# Patient Record
Sex: FEMALE | Race: WHITE | Employment: OTHER | URBAN - METROPOLITAN AREA
[De-identification: names, ages, dates, MRNs, and addresses within clinical notes are randomized per-mention and may not be internally consistent; named-entity substitution may affect disease eponyms.]

---

## 2017-01-09 ENCOUNTER — APPOINTMENT (OUTPATIENT)
Dept: RADIOLOGY | Facility: IMAGING CENTER | Age: 62
End: 2017-01-09
Attending: PHYSICIAN ASSISTANT
Payer: COMMERCIAL

## 2017-01-09 ENCOUNTER — OFFICE VISIT (OUTPATIENT)
Dept: URGENT CARE | Facility: CLINIC | Age: 62
End: 2017-01-09
Payer: COMMERCIAL

## 2017-01-09 VITALS
OXYGEN SATURATION: 94 % | DIASTOLIC BLOOD PRESSURE: 84 MMHG | WEIGHT: 201 LBS | HEART RATE: 85 BPM | BODY MASS INDEX: 36.99 KG/M2 | TEMPERATURE: 98.2 F | HEIGHT: 62 IN | SYSTOLIC BLOOD PRESSURE: 120 MMHG
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
## 2017-01-09 DIAGNOSIS — S59.902A INJURY OF ELBOW, LEFT, INITIAL ENCOUNTER: ICD-10-CM

## 2017-01-09 DIAGNOSIS — S42.402A CLOSED FRACTURE OF DISTAL END OF LEFT HUMERUS, UNSPECIFIED FRACTURE MORPHOLOGY, INITIAL ENCOUNTER: Primary | ICD-10-CM

## 2017-01-09 PROCEDURE — 73080 X-RAY EXAM OF ELBOW: CPT | Mod: TC | Performed by: PHYSICIAN ASSISTANT

## 2017-01-09 PROCEDURE — 99204 OFFICE O/P NEW MOD 45 MIN: CPT | Performed by: PHYSICIAN ASSISTANT

## 2017-01-09 PROCEDURE — A6448 LT COMPRES BAND <3"/YD: HCPCS | Performed by: PHYSICIAN ASSISTANT

## 2017-01-09 PROCEDURE — A4590 SPECIAL CASTING MATERIAL: HCPCS | Performed by: PHYSICIAN ASSISTANT

## 2017-01-09 PROCEDURE — A4565 SLINGS: HCPCS | Performed by: PHYSICIAN ASSISTANT

## 2017-01-09 RX ORDER — HYDROCODONE BITARTRATE AND ACETAMINOPHEN 5; 325 MG/1; MG/1
1-2 TABLET ORAL EVERY 4 HOURS PRN
Qty: 20 TAB | Refills: 0 | Status: SHIPPED | OUTPATIENT
Start: 2017-01-09 | End: 2018-01-13

## 2017-01-09 NOTE — MR AVS SNAPSHOT
"        Deloris Bobr   2017 6:15 PM   Office Visit   MRN: [id number]    Department:  [facility]   Dept Phone:  [phone]    Description:  Female : 1955   Provider:  Laurie Srinivasan PA-C           Reason for Visit     Arm Pain poss left elbow sprain      Allergies as of 2017     No Known Allergies      You were diagnosed with     Injury of elbow, left, initial encounter   [[id number]]         Vital Signs     Blood Pressure Pulse Temperature Height Weight Body Mass Index    120/84 mmHg 85 36.8 °C (98.2 °F) 1.575 m (5' 2\") 91.173 kg (201 lb) 36.75 kg/m2    Oxygen Saturation Smoking Status                94% Never Smoker           Basic Information     Date Of Birth Sex Race Ethnicity Preferred Language    1955 Female White Unknown English      Health Maintenance        Date Due Completion Dates    IMM DTaP/Tdap/Td Vaccine (1 - Tdap) 11/10/1972 ---    PAP SMEAR 11/10/1974 ---    COLONOSCOPY 11/10/2003 ---    IMM ZOSTER VACCINE 11/10/2013 ---    MAMMOGRAM 12/3/2015 12/3/2014, 2013, 11/15/2012, 2008, 2008, 3/12/2007, 3/2/2007, 2006, 2004    IMM INFLUENZA (1) 2016 ---            Current Immunizations     Influenza Vaccine 10/10/2010    Pneumococcal polysaccharide vaccine (PPSV-23) 2011  5:45 AM      Below and/or attached are the medications your provider expects you to take. Review all of your home medications and newly ordered medications with your provider and/or pharmacist. Follow medication instructions as directed by your provider and/or pharmacist. Please keep your medication list with you and share with your provider. Update the information when medications are discontinued, doses are changed, or new medications (including over-the-counter products) are added; and carry medication information at all times in the event of emergency situations     Allergies:  No Known Allergies          Medications  Valid as of: 2017 -  7:11 PM    Generic " Name Brand Name Tablet Size Instructions for use    Albuterol Sulfate (Aero Soln) albuterol 108 (90 BASE) MCG/ACT Inhale 2 Puffs by mouth every 2 hours as needed for Shortness of Breath.        Amlodipine-Olmesartan   Take  by mouth.        Atorvastatin Calcium   Take  by mouth.        Dextromethorphan-Guaifenesin (Syrup) ROBITUSSIN -10 MG/5ML Take 10 mL by mouth every 6 hours as needed for Cough (Cough while awake).        Levothyroxine Sodium   Take  by mouth.        Moxifloxacin HCl (Tab) AVELOX 400 MG Take 1 Tab by mouth every day.        .                 Medicines prescribed today were sent to:     [id number] - [place], [place] - [address] AT [facility] [address]    [address] [place] [address]    Phone: [phone] Fax: [phone]    Open 24 Hours?: No      Medication refill instructions:       If your prescription bottle indicates you have medication refills left, it is not necessary to call your provider’s office. Please contact your pharmacy and they will refill your medication.    If your prescription bottle indicates you do not have any refills left, you may request refills at any time through one of the following ways: The online [employer] system (except Urgent Care), by calling your provider’s office, or by asking your pharmacy to contact your provider’s office with a refill request. Medication refills are processed only during regular business hours and may not be available until the next business day. Your provider may request additional information or to have a follow-up visit with you prior to refilling your medication.   *Please Note: Medication refills are assigned a new Rx number when refilled electronically. Your pharmacy may indicate that no refills were authorized even though a new prescription for the same medication is available at the pharmacy. Please request the medicine by name with the pharmacy before contacting your provider for a refill.        Your  To Do List     Future Labs/Procedures Complete By Expires    DX-ELBOW-COMPLETE 3+ LEFT  As directed 1/9/2018         StyleFactory Access Code: [email or web address]  Expires: 2/8/2017  6:05 PM    StyleFactory  A secure, online tool to manage your health information     [employer] StyleFactory® is a secure, online tool that connects you to your personalized health information from the privacy of your home -- day or night - making it very easy for you to manage your healthcare. Once the activation process is completed, you can even access your medical information using the StyleFactory nigel, which is available for free in the Apple Nigel store or Google Play store.     StyleFactory provides the following levels of access (as shown below):   My Chart Features   [facility] Doctor [facility]  Urgent  Care [facility]  Primary Care  Doctor   Email your healthcare team securely and privately 24/7 X X X    Manage appointments: schedule your next appointment; view details of past/upcoming appointments X      Request prescription refills. X      View recent personal medical records, including lab and immunizations X X X X   View health record, including health history, allergies, medications X X X X   Read reports about your outpatient visits, procedures, consult and ER notes X X X X   See your discharge summary, which is a recap of your hospital and/or ER visit that includes your diagnosis, lab results, and care plan. X X       How to register for Style[employer]:  1. Go to  [email or web address].  2. Click on the Sign Up Now box, which takes you to the New Member Sign Up page. You will need to provide the following information:  a. Enter your StyleFactory Access Code exactly as it appears at the top of this page. (You will not need to use this code after you’ve completed the sign-up process. If you do not sign up before the expiration date, you must request a new code.)   b. Enter your date of birth.   c. Enter your home email  address.   d. Click Submit, and follow the next screen’s instructions.  3. Create a [employer] ID. This will be your Lono login ID and cannot be changed, so think of one that is secure and easy to remember.  4. Create a [employer] password. You can change your password at any time.  5. Enter your Password Reset Question and Answer. This can be used at a later time if you forget your password.   6. Enter your e-mail address. This allows you to receive e-mail notifications when new information is available in Lono.  7. Click Sign Up. You can now view your health information.    For assistance activating your Lono account, call [phone]

## 2017-01-10 NOTE — PATIENT INSTRUCTIONS
Elbow Injury  You or your child has an elbow injury. X-rays and exam today do not show evidence of a fracture (broken bone). That means that only a sling or splint may be required for a brief period of time as directed by your caregiver.  HOME CARE INSTRUCTIONS  · Only take over-the-counter or prescription medicines for pain, discomfort, or fever as directed by your caregiver.   · If you have a splint held on with an elastic wrap or a cast, watch your hand or fingers. If they become numb or cold and blue, loosen the wrap and reapply more loosely. See your caregiver if there is no relief.   · You may use ice on your elbow for 15-20 minutes, 3-4 times per day, for the first 2 to 3 days.   · Use your elbow as directed.   · See your caregiver as directed. It is very important to keep all follow-up referrals and appointments in order to avoid any long-term problems with your elbow including chronic pain or inability to move the elbow normally.   SEEK IMMEDIATE MEDICAL CARE IF:   · There is swelling or increasing pain in your elbow which is not relieved with medications.   · You begin to lose feeling in your hand or fingers, or develop swelling of the hand and fingers.   · You get a cold or blue hand or fingers on injured side.   · If your elbow remains sore, your caregiver may want to x-ray it again. A hairline fracture may not show up on the first x-rays and may only be seen on repeat x-rays ten days to two weeks later. A specialist (radiologist) may examine your x-rays at a later time. In order to get results from the radiologist or their department, make sure you know how and when you are to get that information. It is your responsibility to get results of any tests you may have had.   MAKE SURE YOU:   · Understand these instructions.   · Will watch your condition.   · Will get help right away if you are not doing well or get worse.   Document Released: 03/09/2005 Document Revised: 03/11/2013 Document Reviewed:  08/05/2009  ExitCare® Patient Information ©2013 [employer].

## 2017-01-14 ASSESSMENT — ENCOUNTER SYMPTOMS
TINGLING: 1
NUMBNESS: 0

## 2017-01-14 NOTE — PROGRESS NOTES
Subjective:      Deloris Brandt is a 61 y.o. female who presents with Arm Pain    PMH:  has a past medical history of Hyperthyroidism and Hypertension.  MEDS:   Current outpatient prescriptions:   •  hydrocodone-acetaminophen (NORCO) 5-325 MG Tab per tablet, Take 1-2 Tabs by mouth every four hours as needed., Disp: 20 Tab, Rfl: 0  •  guaifenesin DM (ROBITUSSIN DM) 100-10 MG/5ML SYRP, Take 10 mL by mouth every 6 hours as needed for Cough (Cough while awake)., Disp: 1 Bottle, Rfl: 0  •  moxifloxacin (AVELOX) 400 MG TABS, Take 1 Tab by mouth every day., Disp: 10 Each, Rfl: 0  •  albuterol (VENTOLIN OR PROVENTIL) 108 (90 BASE) MCG/ACT AERS, Inhale 2 Puffs by mouth every 2 hours as needed for Shortness of Breath., Disp: 1 Inhaler, Rfl: 0  •  Amlodipine-Olmesartan (RYAN PO), Take  by mouth., Disp: , Rfl:   •  Atorvastatin Calcium (LIPITOR PO), Take  by mouth., Disp: , Rfl:   •  Levothyroxine Sodium (SYNTHROID PO), Take  by mouth., Disp: , Rfl:   ALLERGIES: No Known Allergies  SURGHX:   Past Surgical History   Procedure Laterality Date   • Other abdominal surgery  01/01/2001     Gastric bypass   • Appendectomy  48 years ago   • Other       tonsilectomy     SOCHX:  reports that she has never smoked. She does not have any smokeless tobacco history on file. She reports that she does not drink alcohol or use illicit drugs.  FH:  Reviewed with patient/family. Not pertinent to this complaint.           HPI Comments: Patient presents with:  Arm Pain: poss left elbow sprain, pt states she tripped over the handle of a snow shovel, put her arm out to catch herself.  PT co pain in elbow, even though she didn't hit it on the ice.         Arm Pain   The incident occurred 3 to 6 hours ago. The incident occurred at home. The injury mechanism was a fall. The pain is present in the left elbow. The quality of the pain is described as aching and burning. The pain does not radiate. The pain is at a severity of 7/10. The pain has been constant  "since the incident. Associated symptoms include tingling (in 4/5 fingers). Pertinent negatives include no chest pain or numbness. The symptoms are aggravated by movement and palpation. She has tried ice and rest for the symptoms. The treatment provided no relief.       Review of Systems   Cardiovascular: Negative for chest pain.   Musculoskeletal:        Elbow pain     Neurological: Positive for tingling (in 4/5 fingers). Negative for numbness.   All other systems reviewed and are negative.         Objective:     /84 mmHg  Pulse 85  Temp(Src) 36.8 °C (98.2 °F)  Ht 1.575 m (5' 2\")  Wt 91.173 kg (201 lb)  BMI 36.75 kg/m2  SpO2 94%     Physical Exam   Constitutional: She is oriented to person, place, and time. She appears well-developed and well-nourished. No distress.   HENT:   Head: Normocephalic and atraumatic.   Eyes: EOM are normal. Pupils are equal, round, and reactive to light.   Neck: Normal range of motion. Neck supple.   Cardiovascular: Normal rate.    Pulmonary/Chest: Effort normal.   Musculoskeletal:        Left elbow: She exhibits decreased range of motion and swelling. She exhibits no effusion and no deformity. Tenderness found. Lateral epicondyle tenderness noted. No olecranon process tenderness noted.        Arms:  Neurological: She is alert and oriented to person, place, and time.   Skin: Skin is warm and dry.   Psychiatric: She has a normal mood and affect.   Nursing note and vitals reviewed.              Assessment/Plan:     1. Closed fracture of distal end of left humerus, unspecified fracture morphology, initial encounter  DX-ELBOW-COMPLETE 3+ LEFT    hydrocodone-acetaminophen (NORCO) 5-325 MG Tab per tablet   2. Injury of elbow, left, initial encounter  DX-ELBOW-COMPLETE 3+ LEFT    hydrocodone-acetaminophen (NORCO) 5-325 MG Tab per tablet   3. Fall at home, initial encounter  DX-ELBOW-COMPLETE 3+ LEFT    hydrocodone-acetaminophen (NORCO) 5-325 MG Tab per tablet     PT placed in " posterior ortho glass splint and then sling.      RICE TREATMENT FOR EXTREMITY INJURIES:  R-rest the extremity as much as possible while pain and swelling persist  I-ice the extremity 15 minutes every 2 hours for the first 24 hours, then 4-5 times daily   C-compress the extremity either with splint or ace wrap as directed  E-elevate the extremity to help with swelling      PT should follow up with ortho in 1-2 days for treatment.   Discussed red flags and reasons to return to UC or ED.  Pt and/or family verbalized understanding of diagnosis and follow up instructions and was given informational handout on diagnosis.  PT discharged.

## 2017-09-26 ENCOUNTER — HOSPITAL ENCOUNTER (OUTPATIENT)
Dept: RADIOLOGY | Facility: MEDICAL CENTER | Age: 62
End: 2017-09-26
Attending: NURSE PRACTITIONER
Payer: COMMERCIAL

## 2017-09-26 DIAGNOSIS — J98.11 DISCOID ATELECTASIS: ICD-10-CM

## 2017-09-26 PROCEDURE — 71020 DX-CHEST-2 VIEWS: CPT

## 2018-01-13 ENCOUNTER — HOSPITAL ENCOUNTER (INPATIENT)
Facility: MEDICAL CENTER | Age: 63
LOS: 2 days | DRG: 193 | End: 2018-01-15
Attending: EMERGENCY MEDICINE | Admitting: FAMILY MEDICINE
Payer: COMMERCIAL

## 2018-01-13 ENCOUNTER — APPOINTMENT (OUTPATIENT)
Dept: RADIOLOGY | Facility: MEDICAL CENTER | Age: 63
DRG: 193 | End: 2018-01-13
Attending: EMERGENCY MEDICINE
Payer: COMMERCIAL

## 2018-01-13 ENCOUNTER — RESOLUTE PROFESSIONAL BILLING HOSPITAL PROF FEE (OUTPATIENT)
Dept: HOSPITALIST | Facility: MEDICAL CENTER | Age: 63
End: 2018-01-13
Payer: COMMERCIAL

## 2018-01-13 DIAGNOSIS — J18.9 PNEUMONIA OF RIGHT LOWER LOBE DUE TO INFECTIOUS ORGANISM: ICD-10-CM

## 2018-01-13 DIAGNOSIS — R09.02 HYPOXIA: ICD-10-CM

## 2018-01-13 PROBLEM — A41.9 SEPSIS (HCC): Status: ACTIVE | Noted: 2018-01-13

## 2018-01-13 PROBLEM — E03.9 HYPOTHYROID: Status: ACTIVE | Noted: 2018-01-13

## 2018-01-13 PROBLEM — R82.81 PYURIA: Status: ACTIVE | Noted: 2018-01-13

## 2018-01-13 PROBLEM — I10 HTN (HYPERTENSION): Status: ACTIVE | Noted: 2018-01-13

## 2018-01-13 PROBLEM — J96.01 ACUTE RESPIRATORY FAILURE WITH HYPOXIA (HCC): Status: ACTIVE | Noted: 2018-01-13

## 2018-01-13 LAB
ALBUMIN SERPL BCP-MCNC: 3.7 G/DL (ref 3.2–4.9)
ALBUMIN/GLOB SERPL: 0.9 G/DL
ALP SERPL-CCNC: 63 U/L (ref 30–99)
ALT SERPL-CCNC: 11 U/L (ref 2–50)
ANION GAP SERPL CALC-SCNC: 11 MMOL/L (ref 0–11.9)
APPEARANCE UR: ABNORMAL
APTT PPP: 36 SEC (ref 24.7–36)
AST SERPL-CCNC: 19 U/L (ref 12–45)
BACTERIA #/AREA URNS HPF: ABNORMAL /HPF
BASOPHILS # BLD AUTO: 0 % (ref 0–1.8)
BASOPHILS # BLD: 0 K/UL (ref 0–0.12)
BILIRUB SERPL-MCNC: 0.3 MG/DL (ref 0.1–1.5)
BILIRUB UR QL STRIP.AUTO: NEGATIVE
BNP SERPL-MCNC: 10 PG/ML (ref 0–100)
BNP SERPL-MCNC: 12 PG/ML (ref 0–100)
BUN SERPL-MCNC: 34 MG/DL (ref 8–22)
CALCIUM SERPL-MCNC: 8.5 MG/DL (ref 8.5–10.5)
CHLORIDE SERPL-SCNC: 103 MMOL/L (ref 96–112)
CO2 SERPL-SCNC: 25 MMOL/L (ref 20–33)
COLOR UR: YELLOW
CREAT SERPL-MCNC: 0.69 MG/DL (ref 0.5–1.4)
EKG IMPRESSION: NORMAL
EOSINOPHIL # BLD AUTO: 0.12 K/UL (ref 0–0.51)
EOSINOPHIL NFR BLD: 1 % (ref 0–6.9)
EPI CELLS #/AREA URNS HPF: ABNORMAL /HPF
ERYTHROCYTE [DISTWIDTH] IN BLOOD BY AUTOMATED COUNT: 43.5 FL (ref 35.9–50)
FLUAV+FLUBV AG SPEC QL IA: NORMAL
GLOBULIN SER CALC-MCNC: 3.9 G/DL (ref 1.9–3.5)
GLUCOSE SERPL-MCNC: 100 MG/DL (ref 65–99)
GLUCOSE UR STRIP.AUTO-MCNC: NEGATIVE MG/DL
HCT VFR BLD AUTO: 38.5 % (ref 37–47)
HGB BLD-MCNC: 12.6 G/DL (ref 12–16)
HYALINE CASTS #/AREA URNS LPF: ABNORMAL /LPF
INR PPP: 1.09 (ref 0.87–1.13)
KETONES UR STRIP.AUTO-MCNC: 40 MG/DL
LACTATE BLD-SCNC: 1 MMOL/L (ref 0.5–2)
LACTATE BLD-SCNC: 1 MMOL/L (ref 0.5–2)
LACTATE BLD-SCNC: 1.2 MMOL/L (ref 0.5–2)
LACTATE BLD-SCNC: 1.3 MMOL/L (ref 0.5–2)
LEUKOCYTE ESTERASE UR QL STRIP.AUTO: ABNORMAL
LG PLATELETS BLD QL SMEAR: NORMAL
LYMPHOCYTES # BLD AUTO: 1.43 K/UL (ref 1–4.8)
LYMPHOCYTES NFR BLD: 12 % (ref 22–41)
MANUAL DIFF BLD: NORMAL
MCH RBC QN AUTO: 26.8 PG (ref 27–33)
MCHC RBC AUTO-ENTMCNC: 32.7 G/DL (ref 33.6–35)
MCV RBC AUTO: 81.7 FL (ref 81.4–97.8)
MICRO URNS: ABNORMAL
MONOCYTES # BLD AUTO: 0.6 K/UL (ref 0–0.85)
MONOCYTES NFR BLD AUTO: 5 % (ref 0–13.4)
MORPHOLOGY BLD-IMP: NORMAL
NEUTROPHILS # BLD AUTO: 9.76 K/UL (ref 2–7.15)
NEUTROPHILS NFR BLD: 82 % (ref 44–72)
NITRITE UR QL STRIP.AUTO: NEGATIVE
NRBC # BLD AUTO: 0 K/UL
NRBC BLD-RTO: 0 /100 WBC
OVALOCYTES BLD QL SMEAR: NORMAL
PH UR STRIP.AUTO: 5 [PH]
PLATELET # BLD AUTO: 365 K/UL (ref 164–446)
PMV BLD AUTO: 9.5 FL (ref 9–12.9)
POTASSIUM SERPL-SCNC: 3.6 MMOL/L (ref 3.6–5.5)
PROT SERPL-MCNC: 7.6 G/DL (ref 6–8.2)
PROT UR QL STRIP: 30 MG/DL
PROTHROMBIN TIME: 13.8 SEC (ref 12–14.6)
RBC # BLD AUTO: 4.71 M/UL (ref 4.2–5.4)
RBC # URNS HPF: ABNORMAL /HPF
RBC BLD AUTO: PRESENT
RBC UR QL AUTO: ABNORMAL
SIGNIFICANT IND 70042: NORMAL
SITE SITE: NORMAL
SODIUM SERPL-SCNC: 139 MMOL/L (ref 135–145)
SOURCE SOURCE: NORMAL
SP GR UR STRIP.AUTO: 1.02
TROPONIN I SERPL-MCNC: <0.01 NG/ML (ref 0–0.04)
TSH SERPL DL<=0.005 MIU/L-ACNC: 0.06 UIU/ML (ref 0.38–5.33)
UROBILINOGEN UR STRIP.AUTO-MCNC: 0.2 MG/DL
VARIANT LYMPHS BLD QL SMEAR: NORMAL
WBC # BLD AUTO: 11.9 K/UL (ref 4.8–10.8)
WBC #/AREA URNS HPF: ABNORMAL /HPF

## 2018-01-13 PROCEDURE — 99222 1ST HOSP IP/OBS MODERATE 55: CPT | Performed by: FAMILY MEDICINE

## 2018-01-13 PROCEDURE — 84484 ASSAY OF TROPONIN QUANT: CPT

## 2018-01-13 PROCEDURE — 81001 URINALYSIS AUTO W/SCOPE: CPT

## 2018-01-13 PROCEDURE — 87086 URINE CULTURE/COLONY COUNT: CPT

## 2018-01-13 PROCEDURE — 85610 PROTHROMBIN TIME: CPT

## 2018-01-13 PROCEDURE — 700102 HCHG RX REV CODE 250 W/ 637 OVERRIDE(OP): Performed by: FAMILY MEDICINE

## 2018-01-13 PROCEDURE — 87400 INFLUENZA A/B EACH AG IA: CPT

## 2018-01-13 PROCEDURE — 99285 EMERGENCY DEPT VISIT HI MDM: CPT

## 2018-01-13 PROCEDURE — 700105 HCHG RX REV CODE 258: Performed by: FAMILY MEDICINE

## 2018-01-13 PROCEDURE — 700102 HCHG RX REV CODE 250 W/ 637 OVERRIDE(OP): Performed by: EMERGENCY MEDICINE

## 2018-01-13 PROCEDURE — 700101 HCHG RX REV CODE 250: Performed by: FAMILY MEDICINE

## 2018-01-13 PROCEDURE — 83605 ASSAY OF LACTIC ACID: CPT | Mod: 91

## 2018-01-13 PROCEDURE — 84443 ASSAY THYROID STIM HORMONE: CPT

## 2018-01-13 PROCEDURE — 770020 HCHG ROOM/CARE - TELE (206)

## 2018-01-13 PROCEDURE — A9270 NON-COVERED ITEM OR SERVICE: HCPCS | Performed by: EMERGENCY MEDICINE

## 2018-01-13 PROCEDURE — 36415 COLL VENOUS BLD VENIPUNCTURE: CPT

## 2018-01-13 PROCEDURE — 80053 COMPREHEN METABOLIC PANEL: CPT

## 2018-01-13 PROCEDURE — A9270 NON-COVERED ITEM OR SERVICE: HCPCS | Performed by: FAMILY MEDICINE

## 2018-01-13 PROCEDURE — 85007 BL SMEAR W/DIFF WBC COUNT: CPT

## 2018-01-13 PROCEDURE — 96361 HYDRATE IV INFUSION ADD-ON: CPT

## 2018-01-13 PROCEDURE — 94640 AIRWAY INHALATION TREATMENT: CPT

## 2018-01-13 PROCEDURE — 94760 N-INVAS EAR/PLS OXIMETRY 1: CPT

## 2018-01-13 PROCEDURE — 71045 X-RAY EXAM CHEST 1 VIEW: CPT

## 2018-01-13 PROCEDURE — 93005 ELECTROCARDIOGRAM TRACING: CPT

## 2018-01-13 PROCEDURE — 96374 THER/PROPH/DIAG INJ IV PUSH: CPT

## 2018-01-13 PROCEDURE — 87040 BLOOD CULTURE FOR BACTERIA: CPT

## 2018-01-13 PROCEDURE — 700111 HCHG RX REV CODE 636 W/ 250 OVERRIDE (IP): Performed by: EMERGENCY MEDICINE

## 2018-01-13 PROCEDURE — 85730 THROMBOPLASTIN TIME PARTIAL: CPT

## 2018-01-13 PROCEDURE — 93005 ELECTROCARDIOGRAM TRACING: CPT | Performed by: EMERGENCY MEDICINE

## 2018-01-13 PROCEDURE — 83880 ASSAY OF NATRIURETIC PEPTIDE: CPT

## 2018-01-13 PROCEDURE — 85027 COMPLETE CBC AUTOMATED: CPT

## 2018-01-13 RX ORDER — LEVOTHYROXINE SODIUM 0.12 MG/1
125 TABLET ORAL DAILY
COMMUNITY
End: 2018-12-18 | Stop reason: SDUPTHER

## 2018-01-13 RX ORDER — LOVASTATIN 20 MG/1
20 TABLET ORAL DAILY
COMMUNITY
End: 2018-12-18 | Stop reason: SDUPTHER

## 2018-01-13 RX ORDER — SODIUM CHLORIDE 9 MG/ML
INJECTION, SOLUTION INTRAVENOUS CONTINUOUS
Status: DISCONTINUED | OUTPATIENT
Start: 2018-01-13 | End: 2018-01-14

## 2018-01-13 RX ORDER — TIZANIDINE 4 MG/1
4 TABLET ORAL EVERY 8 HOURS
Status: DISCONTINUED | OUTPATIENT
Start: 2018-01-13 | End: 2018-01-14

## 2018-01-13 RX ORDER — POLYETHYLENE GLYCOL 3350 17 G/17G
1 POWDER, FOR SOLUTION ORAL
Status: DISCONTINUED | OUTPATIENT
Start: 2018-01-13 | End: 2018-01-15 | Stop reason: HOSPADM

## 2018-01-13 RX ORDER — OXYCODONE HYDROCHLORIDE 5 MG/1
5 TABLET ORAL
Status: DISCONTINUED | OUTPATIENT
Start: 2018-01-13 | End: 2018-01-15 | Stop reason: HOSPADM

## 2018-01-13 RX ORDER — BISACODYL 10 MG
10 SUPPOSITORY, RECTAL RECTAL
Status: DISCONTINUED | OUTPATIENT
Start: 2018-01-13 | End: 2018-01-15 | Stop reason: HOSPADM

## 2018-01-13 RX ORDER — MORPHINE SULFATE 4 MG/ML
2 INJECTION, SOLUTION INTRAMUSCULAR; INTRAVENOUS
Status: DISCONTINUED | OUTPATIENT
Start: 2018-01-13 | End: 2018-01-15 | Stop reason: HOSPADM

## 2018-01-13 RX ORDER — AZITHROMYCIN 250 MG/1
500 TABLET, FILM COATED ORAL DAILY
Status: DISCONTINUED | OUTPATIENT
Start: 2018-01-14 | End: 2018-01-15 | Stop reason: HOSPADM

## 2018-01-13 RX ORDER — LOVASTATIN 20 MG/1
20 TABLET ORAL
Status: DISCONTINUED | OUTPATIENT
Start: 2018-01-13 | End: 2018-01-15 | Stop reason: HOSPADM

## 2018-01-13 RX ORDER — TIZANIDINE 4 MG/1
4 TABLET ORAL EVERY 8 HOURS
COMMUNITY
End: 2023-10-31

## 2018-01-13 RX ORDER — ONDANSETRON 2 MG/ML
4 INJECTION INTRAMUSCULAR; INTRAVENOUS EVERY 4 HOURS PRN
Status: DISCONTINUED | OUTPATIENT
Start: 2018-01-13 | End: 2018-01-15 | Stop reason: HOSPADM

## 2018-01-13 RX ORDER — AMOXICILLIN 250 MG
2 CAPSULE ORAL 2 TIMES DAILY
Status: DISCONTINUED | OUTPATIENT
Start: 2018-01-13 | End: 2018-01-15 | Stop reason: HOSPADM

## 2018-01-13 RX ORDER — SODIUM CHLORIDE 9 MG/ML
500 INJECTION, SOLUTION INTRAVENOUS
Status: DISCONTINUED | OUTPATIENT
Start: 2018-01-13 | End: 2018-01-15 | Stop reason: HOSPADM

## 2018-01-13 RX ORDER — IPRATROPIUM BROMIDE AND ALBUTEROL SULFATE 2.5; .5 MG/3ML; MG/3ML
3 SOLUTION RESPIRATORY (INHALATION) ONCE
Status: COMPLETED | OUTPATIENT
Start: 2018-01-13 | End: 2018-01-13

## 2018-01-13 RX ORDER — ACETAMINOPHEN 325 MG/1
650 TABLET ORAL EVERY 6 HOURS PRN
Status: DISCONTINUED | OUTPATIENT
Start: 2018-01-13 | End: 2018-01-15 | Stop reason: HOSPADM

## 2018-01-13 RX ORDER — AMLODIPINE AND OLMESARTAN MEDOXOMIL 5; 40 MG/1; MG/1
1 TABLET ORAL DAILY
COMMUNITY
End: 2018-12-18 | Stop reason: SDUPTHER

## 2018-01-13 RX ORDER — LEVOTHYROXINE SODIUM 0.1 MG/1
100 TABLET ORAL DAILY
COMMUNITY
End: 2018-01-13

## 2018-01-13 RX ORDER — LEVOTHYROXINE SODIUM 0.12 MG/1
125 TABLET ORAL DAILY
Status: DISCONTINUED | OUTPATIENT
Start: 2018-01-13 | End: 2018-01-15 | Stop reason: HOSPADM

## 2018-01-13 RX ORDER — OXYCODONE HYDROCHLORIDE 5 MG/1
2.5 TABLET ORAL
Status: DISCONTINUED | OUTPATIENT
Start: 2018-01-13 | End: 2018-01-15 | Stop reason: HOSPADM

## 2018-01-13 RX ORDER — PROMETHAZINE HYDROCHLORIDE 25 MG/1
12.5-25 SUPPOSITORY RECTAL EVERY 4 HOURS PRN
Status: DISCONTINUED | OUTPATIENT
Start: 2018-01-13 | End: 2018-01-15 | Stop reason: HOSPADM

## 2018-01-13 RX ORDER — ALBUTEROL SULFATE 90 UG/1
2 AEROSOL, METERED RESPIRATORY (INHALATION) EVERY 4 HOURS PRN
Status: DISCONTINUED | OUTPATIENT
Start: 2018-01-13 | End: 2018-01-15 | Stop reason: HOSPADM

## 2018-01-13 RX ORDER — HYDROCHLOROTHIAZIDE 12.5 MG/1
12.5 CAPSULE, GELATIN COATED ORAL DAILY
COMMUNITY
End: 2018-12-18 | Stop reason: SDUPTHER

## 2018-01-13 RX ORDER — CEFTRIAXONE 2 G/1
2 INJECTION, POWDER, FOR SOLUTION INTRAMUSCULAR; INTRAVENOUS ONCE
Status: COMPLETED | OUTPATIENT
Start: 2018-01-13 | End: 2018-01-13

## 2018-01-13 RX ORDER — ONDANSETRON 4 MG/1
4 TABLET, ORALLY DISINTEGRATING ORAL EVERY 4 HOURS PRN
Status: DISCONTINUED | OUTPATIENT
Start: 2018-01-13 | End: 2018-01-15 | Stop reason: HOSPADM

## 2018-01-13 RX ORDER — SODIUM CHLORIDE 9 MG/ML
1000 INJECTION, SOLUTION INTRAVENOUS ONCE
Status: COMPLETED | OUTPATIENT
Start: 2018-01-13 | End: 2018-01-13

## 2018-01-13 RX ORDER — AZITHROMYCIN 500 MG/1
500 INJECTION, POWDER, LYOPHILIZED, FOR SOLUTION INTRAVENOUS ONCE
Status: DISCONTINUED | OUTPATIENT
Start: 2018-01-13 | End: 2018-01-13

## 2018-01-13 RX ORDER — AZITHROMYCIN 250 MG/1
500 TABLET, FILM COATED ORAL ONCE
Status: COMPLETED | OUTPATIENT
Start: 2018-01-13 | End: 2018-01-13

## 2018-01-13 RX ORDER — SODIUM CHLORIDE 9 MG/ML
30 INJECTION, SOLUTION INTRAVENOUS
Status: COMPLETED | OUTPATIENT
Start: 2018-01-13 | End: 2018-01-13

## 2018-01-13 RX ORDER — PROMETHAZINE HYDROCHLORIDE 25 MG/1
12.5-25 TABLET ORAL EVERY 4 HOURS PRN
Status: DISCONTINUED | OUTPATIENT
Start: 2018-01-13 | End: 2018-01-15 | Stop reason: HOSPADM

## 2018-01-13 RX ADMIN — AZITHROMYCIN 500 MG: 250 TABLET, FILM COATED ORAL at 16:04

## 2018-01-13 RX ADMIN — IPRATROPIUM BROMIDE AND ALBUTEROL SULFATE 3 ML: .5; 3 SOLUTION RESPIRATORY (INHALATION) at 15:20

## 2018-01-13 RX ADMIN — CEFTRIAXONE SODIUM 2 G: 2 INJECTION, POWDER, FOR SOLUTION INTRAMUSCULAR; INTRAVENOUS at 16:04

## 2018-01-13 RX ADMIN — LOVASTATIN 20 MG: 20 TABLET ORAL at 22:42

## 2018-01-13 RX ADMIN — SODIUM CHLORIDE 1000 ML: 9 INJECTION, SOLUTION INTRAVENOUS at 16:05

## 2018-01-13 RX ADMIN — TIZANIDINE 4 MG: 4 TABLET ORAL at 22:43

## 2018-01-13 RX ADMIN — SODIUM CHLORIDE: 9 INJECTION, SOLUTION INTRAVENOUS at 22:44

## 2018-01-13 RX ADMIN — SODIUM CHLORIDE 2721 ML: 9 INJECTION, SOLUTION INTRAVENOUS at 17:19

## 2018-01-13 ASSESSMENT — ENCOUNTER SYMPTOMS
HEARTBURN: 0
COUGH: 1
SHORTNESS OF BREATH: 1
DIARRHEA: 0
NAUSEA: 1
ABDOMINAL PAIN: 0
BLURRED VISION: 0
DIZZINESS: 0
WEAKNESS: 1
VOMITING: 0
WHEEZING: 0
SORE THROAT: 0
CHILLS: 0
FEVER: 0

## 2018-01-13 ASSESSMENT — LIFESTYLE VARIABLES
EVER_SMOKED: NEVER
EVER_SMOKED: NEVER
ALCOHOL_USE: NO

## 2018-01-13 ASSESSMENT — COPD QUESTIONNAIRES
DO YOU EVER COUGH UP ANY MUCUS OR PHLEGM?: NO/ONLY WITH OCCASIONAL COLDS OR INFECTIONS
DURING THE PAST 4 WEEKS HOW MUCH DID YOU FEEL SHORT OF BREATH: NONE/LITTLE OF THE TIME
HAVE YOU SMOKED AT LEAST 100 CIGARETTES IN YOUR ENTIRE LIFE: NO/DON'T KNOW
COPD SCREENING SCORE: 2

## 2018-01-13 ASSESSMENT — PATIENT HEALTH QUESTIONNAIRE - PHQ9
SUM OF ALL RESPONSES TO PHQ QUESTIONS 1-9: 0
SUM OF ALL RESPONSES TO PHQ9 QUESTIONS 1 AND 2: 0
2. FEELING DOWN, DEPRESSED, IRRITABLE, OR HOPELESS: NOT AT ALL
1. LITTLE INTEREST OR PLEASURE IN DOING THINGS: NOT AT ALL

## 2018-01-13 ASSESSMENT — PAIN SCALES - GENERAL: PAINLEVEL_OUTOF10: 0

## 2018-01-13 NOTE — ED NOTES
Pt states that sx started x1 week ago, states that she has been having cough and body aches, pt A&O x4, NAD at this time.

## 2018-01-13 NOTE — ED PROVIDER NOTES
CHIEF COMPLAINT  Chief Complaint   Patient presents with   • Shortness of Breath   • Chest Pain       HPI (1,4)  Deloris Brandt is a 62 y.o. female who presents with 1 week of productive cough, subjective fever, progressive SOB and chest pressure. She says That she has a sick contact (granddaughter) and developed cold like sx's around the same time. She has had difficulty maintaining her appetite and she has had reduced PO fluids. The pt was brought in by her daughter to the ER because she sounded a little confused on the telephone this morning. The pt denies n/v, changes in vision, changes in speech, abd pain, dysuria, diarrhea, edema or rash.    The pt does say that she has been hospitalized previously for a possible MI. She says that she was told at the time that she didn't have one. She denies any smoking hx or hx of COPD. She says that she does have HTN and hypothyroid and is taking her prescribed medications as directed. She denies any drug allergies.      REVIEW OF SYSTEMS(2/10)  Pertinent positives include: cough, sob, chest pressure, AMS  Pertinent negatives include: changes in vision/speech n/v, diarrhea, abd pain, dysuria   All other systems are negative.     PAST MEDICAL HISTORY(PFS1,2)  Past Medical History:   Diagnosis Date   • Hypertension    • Hyperthyroidism        FAMILY HISTORY  No family history on file.    SOCIAL HISTORY  Social History   Substance Use Topics   • Smoking status: Never Smoker   • Smokeless tobacco: Not on file   • Alcohol use No     History   Drug Use No       SURGICAL HISTORY  Past Surgical History:   Procedure Laterality Date   • OTHER ABDOMINAL SURGERY  01/01/2001    Gastric bypass   • APPENDECTOMY  48 years ago   • OTHER      tonsilectomy       CURRENT MEDICATIONS  Home Medications    **Home medications have not yet been reviewed for this encounter**         ALLERGIES  No Known Allergies    PHYSICAL EXAM (2,8)  VITAL SIGNS: /71   Pulse 74   Temp 35.9 °C (96.7 °F)  "(Temporal)   Resp 15   Ht 1.575 m (5' 2\")   Wt 90.7 kg (200 lb)   SpO2 95%   BMI 36.58 kg/m²      Physical Exam   Constitutional: No distress.   HENT:   Head: Normocephalic and atraumatic.   Right Ear: External ear normal.   Left Ear: External ear normal.   Nose: Nose normal.   Mouth/Throat: No oropharyngeal exudate.   Eyes: Conjunctivae and EOM are normal. Pupils are equal, round, and reactive to light. Right eye exhibits no discharge. Left eye exhibits no discharge. No scleral icterus.   Neck: Normal range of motion. Neck supple. No JVD present. No tracheal deviation present. No thyromegaly present.   Cardiovascular: Normal rate, regular rhythm and intact distal pulses.  Exam reveals no gallop and no friction rub.    No murmur heard.  Pulmonary/Chest: Effort normal. No tachypnea. No respiratory distress. She has no wheezes. She has rhonchi.   Abdominal: Soft. Bowel sounds are normal. She exhibits no distension. There is no tenderness. There is no rebound and no guarding.   Musculoskeletal: She exhibits no edema, tenderness or deformity.   Lymphadenopathy:     She has no cervical adenopathy.   Neurological: She is alert. No cranial nerve deficit. GCS score is 15.   Oriented to name and place, not to date   Skin: Skin is warm and dry. No rash noted. She is not diaphoretic. No erythema.       DIFFERENTIAL DIAGNOSIS:  Sepsis  Pneumonia  Flu  MI  PE  CVA    EKG  Sinus Tach  No ST abnormalities  Normal AK interval      RADIOLOGY/PROCEDURES  DX-CHEST-PORTABLE (1 VIEW)   Final Result      1.  Findings as described suspicious for changes of pulmonary edema.   2.  Superimposed pneumonia on the right is not excluded.          LABORATORY: Reviewed as below.  Results for orders placed or performed during the hospital encounter of 01/13/18   Lactic acid (lactate)   Result Value Ref Range    Lactic Acid 1.3 0.5 - 2.0 mmol/L   Lactic acid (lactate)   Result Value Ref Range    Lactic Acid 1.2 0.5 - 2.0 mmol/L   CBC WITH " DIFFERENTIAL   Result Value Ref Range    WBC 11.9 (H) 4.8 - 10.8 K/uL    RBC 4.71 4.20 - 5.40 M/uL    Hemoglobin 12.6 12.0 - 16.0 g/dL    Hematocrit 38.5 37.0 - 47.0 %    MCV 81.7 81.4 - 97.8 fL    MCH 26.8 (L) 27.0 - 33.0 pg    MCHC 32.7 (L) 33.6 - 35.0 g/dL    RDW 43.5 35.9 - 50.0 fL    Platelet Count 365 164 - 446 K/uL    MPV 9.5 9.0 - 12.9 fL    Nucleated RBC 0.00 /100 WBC    NRBC (Absolute) 0.00 K/uL    Neutrophils-Polys 82.00 (H) 44.00 - 72.00 %    Lymphocytes 12.00 (L) 22.00 - 41.00 %    Monocytes 5.00 0.00 - 13.40 %    Eosinophils 1.00 0.00 - 6.90 %    Basophils 0.00 0.00 - 1.80 %    Neutrophils (Absolute) 9.76 (H) 2.00 - 7.15 K/uL    Lymphs (Absolute) 1.43 1.00 - 4.80 K/uL    Monos (Absolute) 0.60 0.00 - 0.85 K/uL    Eos (Absolute) 0.12 0.00 - 0.51 K/uL    Baso (Absolute) 0.00 0.00 - 0.12 K/uL   COMP METABOLIC PANEL   Result Value Ref Range    Sodium 139 135 - 145 mmol/L    Potassium 3.6 3.6 - 5.5 mmol/L    Chloride 103 96 - 112 mmol/L    Co2 25 20 - 33 mmol/L    Anion Gap 11.0 0.0 - 11.9    Glucose 100 (H) 65 - 99 mg/dL    Bun 34 (H) 8 - 22 mg/dL    Creatinine 0.69 0.50 - 1.40 mg/dL    Calcium 8.5 8.5 - 10.5 mg/dL    AST(SGOT) 19 12 - 45 U/L    ALT(SGPT) 11 2 - 50 U/L    Alkaline Phosphatase 63 30 - 99 U/L    Total Bilirubin 0.3 0.1 - 1.5 mg/dL    Albumin 3.7 3.2 - 4.9 g/dL    Total Protein 7.6 6.0 - 8.2 g/dL    Globulin 3.9 (H) 1.9 - 3.5 g/dL    A-G Ratio 0.9 g/dL   INFLUENZA RAPID   Result Value Ref Range    Significant Indicator NEG     Source RESP     Site Nasopharyngeal     Rapid Influenza A-B       Negative for Influenza A and Influenza B antigens.  Infection due to influenza A or B cannot be ruled out  since the antigen present in the specimen may be below the  detection limit of the test. Culture confirmation of  negative samples is recommended.     ESTIMATED GFR   Result Value Ref Range    GFR If African American >60 >60 mL/min/1.73 m 2    GFR If Non African American >60 >60 mL/min/1.73 m 2   BNP    Result Value Ref Range    B Natriuretic Peptide 10 0 - 100 pg/mL   TROPONIN   Result Value Ref Range    Troponin I <0.01 0.00 - 0.04 ng/mL   DIFFERENTIAL MANUAL   Result Value Ref Range    Manual Diff Status PERFORMED    PERIPHERAL SMEAR REVIEW   Result Value Ref Range    Peripheral Smear Review see below    MORPHOLOGY   Result Value Ref Range    RBC Morphology Present     Large Platelets 1+     Ovalocytes 1+     Reactive Lymphocytes Few    Prothrombin time (INR)   Result Value Ref Range    PT 13.8 12.0 - 14.6 sec    INR 1.09 0.87 - 1.13   APTT   Result Value Ref Range    APTT 36.0 24.7 - 36.0 sec   EKG (ER)   Result Value Ref Range    Report       [facility] Emergency Dept.    Test Date:  2018  Pt Name:    THANH PIERRE                  Department: ER  MRN:        [id number]                      Room:  Gender:     Female                       Technician: EDSMARIA DE JESUS  :        [id number]                   Requested By:ER TRIAGE PROTOCOL  Order #:    [id number]                    Reading MD:    Measurements  Intervals                                Axis  Rate:       100                          P:          22  TN:         144                          QRS:        -24  QRSD:       92                           T:          25  QT:         372  QTc:        480    Interpretive Statements  SINUS TACHYCARDIA  PROBABLE INFERIOR INFARCT, AGE INDETERMINATE  Compared to ECG 04/10/2011 12:34:40  Myocardial infarct finding now present         INTERVENTIONS:  Medications   levothyroxine (SYNTHROID) tablet 125 mcg (not administered)   lovastatin (MEVACOR) tablet 20 mg (not administered)   tizanidine (ZANAFLEX) tablet 4 mg (not administered)   NS infusion 2,721 mL (not administered)   senna-docusate (PERICOLACE or SENOKOT S) 8.6-50 MG per tablet 2 Tab (not administered)     And   polyethylene glycol/lytes (MIRALAX) PACKET 1 Packet (not administered)     And   magnesium hydroxide (MILK OF MAGNESIA) suspension 30 mL  (not administered)     And   bisacodyl (DULCOLAX) suppository 10 mg (not administered)   Respiratory Care per Protocol (not administered)   NS (BOLUS) infusion 500 mL (not administered)   NS infusion (not administered)   enoxaparin (LOVENOX) inj 40 mg (not administered)   acetaminophen (TYLENOL) tablet 650 mg (not administered)   Pharmacy Consult Request ...Pain Management Review (not administered)     And   oxycodone immediate-release (ROXICODONE) tablet 2.5 mg (not administered)     And   oxycodone immediate-release (ROXICODONE) tablet 5 mg (not administered)     And   morphine (pf) 4 mg/ml injection 2 mg (not administered)   ondansetron (ZOFRAN) syringe/vial injection 4 mg (not administered)   ondansetron (ZOFRAN ODT) dispertab 4 mg (not administered)   promethazine (PHENERGAN) tablet 12.5-25 mg (not administered)   promethazine (PHENERGAN) suppository 12.5-25 mg (not administered)   prochlorperazine (COMPAZINE) injection 5-10 mg (not administered)   cefTRIAXone (ROCEPHIN) syringe 2 g (not administered)   azithromycin (ZITHROMAX) tablet 500 mg (not administered)   albuterol inhaler 2 Puff (not administered)   NS (BOLUS) infusion 1,000 mL (1,000 mL Intravenous Given 1/13/18 1605)   cefTRIAXone (ROCEPHIN) injection 2 g (2 g Intravenous Given 1/13/18 1604)   azithromycin (ZITHROMAX) tablet 500 mg (500 mg Oral Given 1/13/18 1604)   ipratropium-albuterol (DUONEB) nebulizer solution 3 mL (3 mL Nebulization Given 1/13/18 1520)       COURSE & MEDICAL DECISION MAKING    3:00 Pm - Pt presents with one week of cough/sob/chest pressure and possibly some AMS notice by her daughter. She presented with Tachycardia and hypoxia (satting 86% on RA). HX is suggestive of respiratory infection. Sepsis protocol was initiated. The pt was given supplemental O2 ( 3L) and a Bolus of NS. CBC, CMP, Lactic acid and blood cx's were drawn as well as a troponin and BNP. Her EKG demonstrated sinus tach with no ST abnormalities. A CXR  demonstrated a RLL haziness that in this clinical scenario is suggestive of pneumonia. The pt was started on ceftriaxone and azithromycin while labs were pending. Given the Pt's normal neuro exam I do not suspect a CVA and believe her AMS is secondary to infection/dehydration.    3:30 pm: WBC mildly elevated, Lactic Acid wnl, Rapid Flu neg, troponin neg - pt doing better with supplemental O2, no longer tachycardic, Satting 96% on 3 L - she will also be given a neb treatment at this time    4 pm: Pt is comfortable and will be admitted to the on call hospitalist for pneumonia    PLAN:  Admit to hospitalist for hypoxia secondary to Pneumonia    CONDITION: Stable    FINAL IMPRESSION  1. Hypoxia    2. Pneumonia of right lower lobe due to infectious organism (CMS-HCC)          Electronically signed by: Richar Badillo, 1/13/2018 3:07 PM    I independently evaluated the patient and repeated the important components of the history and physical.  I discussed the management with the resident. I have reviewed and agree with the pertinent clinical information above including history, exam, study findings, and recommendations.     Encounter Summary: This is a 62 y.o. female with cough and shortness of breath, found to be hypoxic and an x-ray suggesting pneumonia, treated with broad-spectrum antibiotics and admitted to the hospital.      1. Hypoxia    2. Pneumonia of right lower lobe due to infectious organism (CMS-HCC)          Electronically signed by: Forrest Parker, 1/13/2018 9:21 PM

## 2018-01-13 NOTE — FLOWSHEET NOTE
01/13/18 1522   Interdisciplinary Plan of Care-Goals (Indications)   Obstructive Ventilatory Defect or Pulmonary Disease without Obvious Obstruction Physical Exam / Hyperinflation / Wheezing (bronchospasm)   Interdisciplinary Plan of Care-Outcomes    Bronchodilator Outcome Diminished Wheezing and Volume of Air Movement Increased   Education   Education Yes - Pt. / Family has been Instructed in use of Respiratory Medications and Adverse Reactions   RT Assessment of Delivered Medications   Evaluation of Medication Delivery Daily Yes-- Pt /Family has been Instructed in use of Respiratory Medications and Adverse Reactions   SVN Group   #SVN Performed Yes   Given By: Mask   Date SVN Last Changed 01/13/18   Date SVN Next Change Due (Q 7 Days) 01/20/18   Respiratory WDL   Respiratory (WDL) X   Chest Exam   Respiration 12   Pulse 85   Heart Rate (Monitored) 85   Breath Sounds   Pre/Post Intervention Pre Intervention Assessment   RUL Breath Sounds Expiratory Wheezes   RML Breath Sounds Expiratory Wheezes   RLL Breath Sounds Diminished   NASIM Breath Sounds Diminished   LLL Breath Sounds Diminished   Oximetry   Continuous Oximetry Yes   O2 Alarms Set & Reviewed Yes   Oxygen   Home O2 Use Prior To Admission? No   Pulse Oximetry 95 %   O2 (LPM) 2   O2 Daily Delivery Respiratory  Nasal Cannula

## 2018-01-13 NOTE — ED NOTES
Pt to triage c/o SOB and chest pain x 1 week. Pt with generalized weakness, no appetite, productive cough, and body aches. Pt found to be 86% on RA.

## 2018-01-14 PROBLEM — R63.0 APPETITE LOSS: Status: ACTIVE | Noted: 2018-01-14

## 2018-01-14 LAB
ANION GAP SERPL CALC-SCNC: 10 MMOL/L (ref 0–11.9)
BASOPHILS # BLD AUTO: 0 % (ref 0–1.8)
BASOPHILS # BLD: 0 K/UL (ref 0–0.12)
BUN SERPL-MCNC: 17 MG/DL (ref 8–22)
CALCIUM SERPL-MCNC: 7.7 MG/DL (ref 8.5–10.5)
CHLORIDE SERPL-SCNC: 112 MMOL/L (ref 96–112)
CO2 SERPL-SCNC: 22 MMOL/L (ref 20–33)
CREAT SERPL-MCNC: 0.45 MG/DL (ref 0.5–1.4)
EOSINOPHIL # BLD AUTO: 0 K/UL (ref 0–0.51)
EOSINOPHIL NFR BLD: 0 % (ref 0–6.9)
ERYTHROCYTE [DISTWIDTH] IN BLOOD BY AUTOMATED COUNT: 43.8 FL (ref 35.9–50)
GLUCOSE SERPL-MCNC: 91 MG/DL (ref 65–99)
HCT VFR BLD AUTO: 32.9 % (ref 37–47)
HGB BLD-MCNC: 10.6 G/DL (ref 12–16)
LYMPHOCYTES # BLD AUTO: 1.34 K/UL (ref 1–4.8)
LYMPHOCYTES NFR BLD: 13.3 % (ref 22–41)
MANUAL DIFF BLD: NORMAL
MCH RBC QN AUTO: 26.5 PG (ref 27–33)
MCHC RBC AUTO-ENTMCNC: 32.2 G/DL (ref 33.6–35)
MCV RBC AUTO: 82.3 FL (ref 81.4–97.8)
MONOCYTES # BLD AUTO: 0.63 K/UL (ref 0–0.85)
MONOCYTES NFR BLD AUTO: 6.2 % (ref 0–13.4)
MORPHOLOGY BLD-IMP: NORMAL
NEUTROPHILS # BLD AUTO: 8.13 K/UL (ref 2–7.15)
NEUTROPHILS NFR BLD: 80.5 % (ref 44–72)
NRBC # BLD AUTO: 0 K/UL
NRBC BLD-RTO: 0 /100 WBC
OVALOCYTES BLD QL SMEAR: NORMAL
PLATELET # BLD AUTO: 301 K/UL (ref 164–446)
PLATELET BLD QL SMEAR: NORMAL
PMV BLD AUTO: 9.8 FL (ref 9–12.9)
POIKILOCYTOSIS BLD QL SMEAR: NORMAL
POTASSIUM SERPL-SCNC: 3.6 MMOL/L (ref 3.6–5.5)
RBC # BLD AUTO: 4 M/UL (ref 4.2–5.4)
RBC BLD AUTO: PRESENT
SODIUM SERPL-SCNC: 144 MMOL/L (ref 135–145)
WBC # BLD AUTO: 10.1 K/UL (ref 4.8–10.8)

## 2018-01-14 PROCEDURE — 700105 HCHG RX REV CODE 258: Performed by: FAMILY MEDICINE

## 2018-01-14 PROCEDURE — 36415 COLL VENOUS BLD VENIPUNCTURE: CPT

## 2018-01-14 PROCEDURE — 700102 HCHG RX REV CODE 250 W/ 637 OVERRIDE(OP): Performed by: HOSPITALIST

## 2018-01-14 PROCEDURE — 99232 SBSQ HOSP IP/OBS MODERATE 35: CPT | Performed by: HOSPITALIST

## 2018-01-14 PROCEDURE — 700111 HCHG RX REV CODE 636 W/ 250 OVERRIDE (IP): Performed by: FAMILY MEDICINE

## 2018-01-14 PROCEDURE — 770020 HCHG ROOM/CARE - TELE (206)

## 2018-01-14 PROCEDURE — 80048 BASIC METABOLIC PNL TOTAL CA: CPT

## 2018-01-14 PROCEDURE — A9270 NON-COVERED ITEM OR SERVICE: HCPCS | Performed by: HOSPITALIST

## 2018-01-14 PROCEDURE — 85007 BL SMEAR W/DIFF WBC COUNT: CPT

## 2018-01-14 PROCEDURE — 700102 HCHG RX REV CODE 250 W/ 637 OVERRIDE(OP): Performed by: FAMILY MEDICINE

## 2018-01-14 PROCEDURE — A9270 NON-COVERED ITEM OR SERVICE: HCPCS | Performed by: FAMILY MEDICINE

## 2018-01-14 PROCEDURE — 85027 COMPLETE CBC AUTOMATED: CPT

## 2018-01-14 RX ORDER — TIZANIDINE 4 MG/1
4 TABLET ORAL EVERY 6 HOURS PRN
Status: DISCONTINUED | OUTPATIENT
Start: 2018-01-14 | End: 2018-01-15 | Stop reason: HOSPADM

## 2018-01-14 RX ADMIN — TIZANIDINE 4 MG: 4 TABLET ORAL at 05:00

## 2018-01-14 RX ADMIN — TIZANIDINE 4 MG: 4 TABLET ORAL at 21:23

## 2018-01-14 RX ADMIN — OXYCODONE HYDROCHLORIDE 5 MG: 5 TABLET ORAL at 05:00

## 2018-01-14 RX ADMIN — SODIUM CHLORIDE: 9 INJECTION, SOLUTION INTRAVENOUS at 07:57

## 2018-01-14 RX ADMIN — AZITHROMYCIN 500 MG: 250 TABLET, FILM COATED ORAL at 07:49

## 2018-01-14 RX ADMIN — STANDARDIZED SENNA CONCENTRATE AND DOCUSATE SODIUM 2 TABLET: 8.6; 5 TABLET, FILM COATED ORAL at 21:24

## 2018-01-14 RX ADMIN — OXYCODONE HYDROCHLORIDE 5 MG: 5 TABLET ORAL at 13:52

## 2018-01-14 RX ADMIN — LOVASTATIN 20 MG: 20 TABLET ORAL at 21:24

## 2018-01-14 RX ADMIN — OXYCODONE HYDROCHLORIDE 5 MG: 5 TABLET ORAL at 18:43

## 2018-01-14 RX ADMIN — OXYCODONE HYDROCHLORIDE 5 MG: 5 TABLET ORAL at 21:24

## 2018-01-14 RX ADMIN — CEFTRIAXONE 2 G: 2 INJECTION, POWDER, FOR SOLUTION INTRAMUSCULAR; INTRAVENOUS at 07:49

## 2018-01-14 RX ADMIN — TIZANIDINE 4 MG: 4 TABLET ORAL at 13:21

## 2018-01-14 RX ADMIN — OXYCODONE HYDROCHLORIDE 5 MG: 5 TABLET ORAL at 09:52

## 2018-01-14 RX ADMIN — ENOXAPARIN SODIUM 40 MG: 100 INJECTION SUBCUTANEOUS at 07:50

## 2018-01-14 RX ADMIN — LEVOTHYROXINE SODIUM 125 MCG: 125 TABLET ORAL at 07:50

## 2018-01-14 ASSESSMENT — PAIN SCALES - GENERAL
PAINLEVEL_OUTOF10: 6
PAINLEVEL_OUTOF10: 0
PAINLEVEL_OUTOF10: 7
PAINLEVEL_OUTOF10: 0
PAINLEVEL_OUTOF10: 3
PAINLEVEL_OUTOF10: 5
PAINLEVEL_OUTOF10: 9
PAINLEVEL_OUTOF10: 0
PAINLEVEL_OUTOF10: 6

## 2018-01-14 ASSESSMENT — ENCOUNTER SYMPTOMS
WHEEZING: 1
NAUSEA: 0
DEPRESSION: 0
BACK PAIN: 0
VOMITING: 0
MUSCULOSKELETAL NEGATIVE: 1
HEARTBURN: 0
DIZZINESS: 0
POLYDIPSIA: 0
NERVOUS/ANXIOUS: 1
PALPITATIONS: 0
SPUTUM PRODUCTION: 1
FEVER: 1
FOCAL WEAKNESS: 0
NECK PAIN: 0
EYES NEGATIVE: 1
BRUISES/BLEEDS EASILY: 0
SHORTNESS OF BREATH: 1
GASTROINTESTINAL NEGATIVE: 1
HEADACHES: 0
WEAKNESS: 1
COUGH: 1
CARDIOVASCULAR NEGATIVE: 1

## 2018-01-14 NOTE — PROGRESS NOTES
[facility] Hospitalist Progress Note    Date of Service: 2018    Chief Complaint  62 y.o. female admitted 2018 with Pneumonia and hypoxia    Interval Problem Update  Patient seen and examined today.    Patient tolerating treatment and therapies.  All Data, Medication data reviewed.  Case discussed with nursing as available.  Plan of Care reviewed with patient and notified of changes.   Pt feels ok, still cough and dyspnea and desaturation, feesl weak and has no appetite    Consultants/Specialty  N    Disposition  TBA        Review of Systems   Constitutional: Positive for fever and malaise/fatigue.   HENT: Negative.    Eyes: Negative.    Respiratory: Positive for cough, sputum production, shortness of breath and wheezing.    Cardiovascular: Negative.  Negative for chest pain and palpitations.   Gastrointestinal: Negative.  Negative for heartburn, nausea and vomiting.   Genitourinary: Negative.  Negative for dysuria and frequency.   Musculoskeletal: Negative.  Negative for back pain and neck pain.   Skin: Negative.  Negative for itching and rash.   Neurological: Positive for weakness. Negative for dizziness, focal weakness and headaches.   Endo/Heme/Allergies: Negative.  Negative for polydipsia. Does not bruise/bleed easily.   Psychiatric/Behavioral: Negative for depression. The patient is nervous/anxious.       Physical Exam  Laboratory/Imaging   Hemodynamics  Temp (24hrs), Av.3 °C (97.4 °F), Min:36.1 °C (97 °F), Max:36.8 °C (98.3 °F)   Temperature: 36.1 °C (97 °F)  Pulse  Av.7  Min: 70  Max: 113 Heart Rate (Monitored): 85  Blood Pressure: 120/64, NIBP: (!) 99/49      Respiratory      Respiration: 18, Pulse Oximetry: 95 %, O2 Daily Delivery Respiratory : Nasal Cannula     Given By:: Mask, Work Of Breathing / Effort: Mild  RUL Breath Sounds: Diminished, RML Breath Sounds: Diminished, RLL Breath Sounds: Diminished, NASIM Breath Sounds: Diminished, LLL Breath Sounds:  Diminished    Fluids    Intake/Output Summary (Last 24 hours) at 01/14/18 1518  Last data filed at 01/14/18 1200   Gross per 24 hour   Intake              600 ml   Output             1300 ml   Net             -700 ml       Nutrition  Orders Placed This Encounter   Procedures   • Diet Order     Standing Status:   Standing     Number of Occurrences:   1     Order Specific Question:   Diet:     Answer:   Regular [1]     Physical Exam   Constitutional: She is oriented to person, place, and time. She appears well-developed and well-nourished. She appears lethargic. She has a sickly appearance. No distress.   HENT:   Head: Normocephalic and atraumatic.   Nose: Nose normal.   Mouth/Throat: Oropharynx is clear and moist.   Eyes: Conjunctivae and EOM are normal. Pupils are equal, round, and reactive to light.   Neck: Normal range of motion. Neck supple. No JVD present. No thyromegaly present.   Cardiovascular: Normal rate, regular rhythm and normal heart sounds.  Exam reveals no gallop and no friction rub.    Pulmonary/Chest: Effort normal. No respiratory distress. She has wheezes. She has rales.   Abdominal: Soft. Bowel sounds are normal. She exhibits no distension and no mass. There is no tenderness. There is no rebound and no guarding.   Musculoskeletal: Normal range of motion. She exhibits no edema or tenderness.   Lymphadenopathy:     She has no cervical adenopathy.   Neurological: She is oriented to person, place, and time. She appears lethargic. No cranial nerve deficit.   Skin: Skin is warm and dry. She is not diaphoretic.   Psychiatric: She has a normal mood and affect. Her behavior is normal.   Nursing note and vitals reviewed.      Recent Labs      01/13/18   1427  01/14/18   0315   WBC  11.9*  10.1   RBC  4.71  4.00*   HEMOGLOBIN  12.6  10.6*   HEMATOCRIT  38.5  32.9*   MCV  81.7  82.3   MCH  26.8*  26.5*   MCHC  32.7*  32.2*   RDW  43.5  43.8   PLATELETCT  365  301   MPV  9.5  9.8     Recent Labs      01/13/18    1427  01/14/18   0315   SODIUM  139  144   POTASSIUM  3.6  3.6   CHLORIDE  103  112   CO2  25  22   GLUCOSE  100*  91   BUN  34*  17   CREATININE  0.69  0.45*   CALCIUM  8.5  7.7*     Recent Labs      01/13/18   1427   APTT  36.0   INR  1.09     Recent Labs      01/13/18   1516  01/13/18   1802   BNPBTYPENAT  10  12              Assessment/Plan     * Pneumonia- (present on admission)   Assessment & Plan    IV azithromycin and Rocephin  Culture to be collected        Acute respiratory failure with hypoxia (CMS-HCC)- (present on admission)   Assessment & Plan    Keep O2 sats above 90%  RT protocol        Sepsis (CMS-[facility])- (present on admission)   Assessment & Plan    Source is pulmonary  Protocol started        Pyuria- (present on admission)   Assessment & Plan    Follow culture        Hypothyroid- (present on admission)   Assessment & Plan    Hold levothyroxine, TSH is low        HTN (hypertension)- (present on admission)   Assessment & Plan    Hold current medications        Appetite loss- (present on admission)   Assessment & Plan    Likely illness related            Reviewed items::  Radiology images reviewed, Labs reviewed and Medications reviewed  Deutsch catheter::  No Deutsch  DVT prophylaxis pharmacological::  Enoxaparin (Lovenox)  Antibiotics:  Treating active infection/contamination beyond 24 hours perioperative coverage      Plan   C/w IV abx  Culture to be collected  D/c IVF's  Prepare for poss home O2  See orders  More than 35 minutes was spent in pt exam, interview, and more than 50 % of this in discussion of diagnoses, prognosis and disposition with patient, family, nurse and case management coordinator.

## 2018-01-14 NOTE — PROGRESS NOTES
Care of patient assumed after report. Assessment completed. Call light in reach, fall precautions in place. Patient states she feels a little bit better today, denies having any pain or shortness of breath. Discussed plan of care with patient, all questions answered. Will continue to monitor.

## 2018-01-14 NOTE — H&P
Hospital Medicine History and Physical    Date of Service  1/13/2018    Chief Complaint  Chief Complaint   Patient presents with   • Shortness of Breath   • Chest Pain       History of Presenting Illness  62 y.o. female who presented 1/13/2018 with cough and congestion, shortness of breath, wheezing going on for about a week now. She has had some nausea. She also chest pain related to cough. She thinks she may have had a low-grade fever initially, no chills. Her granddaughter was sick. Workup here shows pneumonia on the right side. She was also to be quite hypoxic on room air and currently requiring oxygen supplementation. Her systolic blood pressure dropped to the 90s but responded well to fluids   Primary Care Physician  Tabitha Quintero M.D.    Consultants  None    Code Status  Full    Review of Systems  Review of Systems   Constitutional: Positive for malaise/fatigue. Negative for chills and fever.   HENT: Negative for hearing loss and sore throat.    Eyes: Negative for blurred vision.   Respiratory: Positive for cough and shortness of breath. Negative for wheezing.    Cardiovascular: Positive for chest pain. Negative for leg swelling.   Gastrointestinal: Positive for nausea. Negative for abdominal pain, diarrhea, heartburn and vomiting.   Genitourinary: Negative for dysuria.   Neurological: Positive for weakness. Negative for dizziness.        Past Medical History  Past Medical History:   Diagnosis Date   • Hyperlipidemia    • Hypertension    • Hypothyroid        Surgical History  Past Surgical History:   Procedure Laterality Date   • OTHER ABDOMINAL SURGERY  01/01/2001    Gastric bypass   • APPENDECTOMY  48 years ago   • OTHER      tonsilectomy       Medications  No current facility-administered medications on file prior to encounter.      No current outpatient prescriptions on file prior to encounter.       Family History  Family History   Problem Relation Age of Onset   • Family history unknown: Yes        Social History  Social History   Substance Use Topics   • Smoking status: Never Smoker   • Smokeless tobacco: Not on file   • Alcohol use No       Allergies  No Known Allergies     Physical Exam  Laboratory   Hemodynamics  Temp (24hrs), Av.9 °C (96.7 °F), Min:35.9 °C (96.7 °F), Max:35.9 °C (96.7 °F)   Temperature: 35.9 °C (96.7 °F)  Pulse  Av.9  Min: 74  Max: 113 Heart Rate (Monitored): 85  Blood Pressure: 131/71, NIBP: (!) 99/49      Respiratory      Respiration: 16, Pulse Oximetry: 99 %, O2 Daily Delivery Respiratory : Nasal Cannula     Given By:: Mask  RUL Breath Sounds: Expiratory Wheezes, RML Breath Sounds: Expiratory Wheezes, RLL Breath Sounds: Diminished, NASIM Breath Sounds: Diminished, LLL Breath Sounds: Diminished    Physical Exam   Constitutional: She is oriented to person, place, and time. She appears well-developed and well-nourished.   HENT:   Head: Normocephalic and atraumatic.   Eyes: Conjunctivae are normal. Pupils are equal, round, and reactive to light.   Neck: No tracheal deviation present. No thyromegaly present.   Cardiovascular: Normal rate and regular rhythm.    Pulmonary/Chest: Accessory muscle usage present. She has rales.   Abdominal: Soft. Bowel sounds are normal. She exhibits no distension. There is no tenderness.   Lymphadenopathy:     She has no cervical adenopathy.   Neurological: She is alert and oriented to person, place, and time.   Skin: Skin is warm and dry.   Nursing note and vitals reviewed.      Recent Labs      18   1427   WBC  11.9*   RBC  4.71   HEMOGLOBIN  12.6   HEMATOCRIT  38.5   MCV  81.7   MCH  26.8*   MCHC  32.7*   RDW  43.5   PLATELETCT  365   MPV  9.5     Recent Labs      18   1427   SODIUM  139   POTASSIUM  3.6   CHLORIDE  103   CO2  25   GLUCOSE  100*   BUN  34*   CREATININE  0.69   CALCIUM  8.5     Recent Labs      18   1427   ALTSGPT  11   ASTSGOT  19   ALKPHOSPHAT  63   TBILIRUBIN  0.3   GLUCOSE  100*     Recent Labs       01/13/18   1427   APTT  36.0   INR  1.09     Recent Labs      01/13/18   1516  01/13/18   1802   BNPBTYPENAT  10  12         Lab Results   Component Value Date    TROPONINI <0.01 01/13/2018     Urinalysis:    Lab Results  Component Value Date/Time   SPECGRAVITY 1.020 01/13/2018 1654   GLUCOSEUR Negative 01/13/2018 1654   KETONES 40 (A) 01/13/2018 1654   NITRITE Negative 01/13/2018 1654   WBCURINE  (A) 01/13/2018 1654   RBCURINE 0-2 01/13/2018 1654   BACTERIA Many (A) 01/13/2018 1654   EPITHELCELL Many (A) 01/13/2018 1654        Imaging  CXR  Findings as described suspicious for changes of pulmonary edema.  Superimposed pneumonia on the right is not excluded.   Assessment/Plan     I anticipate this patient will require at least two midnights for appropriate medical management, necessitating inpatient admission.    * Pneumonia- (present on admission)   Assessment & Plan    IV azithromycin and Rocephin        Acute respiratory failure with hypoxia (CMS-HCC)- (present on admission)   Assessment & Plan    Keep O2 sats above 90%  RT protocol        Sepsis (CMS-HCC)- (present on admission)   Assessment & Plan    Source is pulmonary  Protocol started        Pyuria- (present on admission)   Assessment & Plan    Follow culture        Hypothyroid- (present on admission)   Assessment & Plan    Hold levothyroxine, TSH is low        HTN (hypertension)- (present on admission)   Assessment & Plan    Hold current medications            VTE prophylaxis: Lovenox.

## 2018-01-14 NOTE — RESPIRATORY CARE
COPD EDUCATION by COPD CLINICAL EDUCATOR  1/14/2018 at 5:43 AM by Monica Wild     Patient reviewed by COPD education team. Patient does not qualify for COPD program.

## 2018-01-14 NOTE — CARE PLAN
Problem: Safety  Goal: Will remain free from injury    Intervention: Provide assistance with mobility  Patient steady on feet. Treaded slipper socks on. Bed in low/locked position. Hourly rounding in place. Call light within patient's reach.       Problem: Venous Thromboembolism (VTW)/Deep Vein Thrombosis (DVT) Prevention:  Goal: Patient will participate in Venous Thrombosis (VTE)/Deep Vein Thrombosis (DVT)Prevention Measures    Intervention: Assess and monitor for anticoagulation complications  Patient receiving SQ lovenox.       Problem: Respiratory:  Goal: Respiratory status will improve  Will attempt to ween patient off oxygen today.

## 2018-01-15 VITALS
WEIGHT: 196.65 LBS | DIASTOLIC BLOOD PRESSURE: 77 MMHG | SYSTOLIC BLOOD PRESSURE: 137 MMHG | OXYGEN SATURATION: 92 % | HEART RATE: 76 BPM | RESPIRATION RATE: 16 BRPM | BODY MASS INDEX: 36.19 KG/M2 | TEMPERATURE: 97.1 F | HEIGHT: 62 IN

## 2018-01-15 PROBLEM — J96.01 ACUTE RESPIRATORY FAILURE WITH HYPOXIA (HCC): Status: RESOLVED | Noted: 2018-01-13 | Resolved: 2018-01-15

## 2018-01-15 PROBLEM — A41.9 SEPSIS (HCC): Status: RESOLVED | Noted: 2018-01-13 | Resolved: 2018-01-15

## 2018-01-15 LAB
ALBUMIN SERPL BCP-MCNC: 3.2 G/DL (ref 3.2–4.9)
ALBUMIN/GLOB SERPL: 1 G/DL
ALP SERPL-CCNC: 59 U/L (ref 30–99)
ALT SERPL-CCNC: 7 U/L (ref 2–50)
ANION GAP SERPL CALC-SCNC: 9 MMOL/L (ref 0–11.9)
AST SERPL-CCNC: 15 U/L (ref 12–45)
BACTERIA UR CULT: NORMAL
BASOPHILS # BLD AUTO: 0.9 % (ref 0–1.8)
BASOPHILS # BLD: 0.09 K/UL (ref 0–0.12)
BILIRUB SERPL-MCNC: 0.2 MG/DL (ref 0.1–1.5)
BNP SERPL-MCNC: 252 PG/ML (ref 0–100)
BUN SERPL-MCNC: 13 MG/DL (ref 8–22)
CALCIUM SERPL-MCNC: 8.3 MG/DL (ref 8.5–10.5)
CHLORIDE SERPL-SCNC: 110 MMOL/L (ref 96–112)
CO2 SERPL-SCNC: 22 MMOL/L (ref 20–33)
CREAT SERPL-MCNC: 0.62 MG/DL (ref 0.5–1.4)
EOSINOPHIL # BLD AUTO: 0.18 K/UL (ref 0–0.51)
EOSINOPHIL NFR BLD: 1.7 % (ref 0–6.9)
ERYTHROCYTE [DISTWIDTH] IN BLOOD BY AUTOMATED COUNT: 44.2 FL (ref 35.9–50)
GLOBULIN SER CALC-MCNC: 3.3 G/DL (ref 1.9–3.5)
GLUCOSE SERPL-MCNC: 77 MG/DL (ref 65–99)
HCT VFR BLD AUTO: 34.4 % (ref 37–47)
HGB BLD-MCNC: 10.9 G/DL (ref 12–16)
LYMPHOCYTES # BLD AUTO: 2.6 K/UL (ref 1–4.8)
LYMPHOCYTES NFR BLD: 25.2 % (ref 22–41)
MAGNESIUM SERPL-MCNC: 2.1 MG/DL (ref 1.5–2.5)
MANUAL DIFF BLD: NORMAL
MCH RBC QN AUTO: 26.3 PG (ref 27–33)
MCHC RBC AUTO-ENTMCNC: 31.7 G/DL (ref 33.6–35)
MCV RBC AUTO: 83.1 FL (ref 81.4–97.8)
MONOCYTES # BLD AUTO: 0.54 K/UL (ref 0–0.85)
MONOCYTES NFR BLD AUTO: 5.2 % (ref 0–13.4)
MORPHOLOGY BLD-IMP: NORMAL
NEUTROPHILS # BLD AUTO: 6.9 K/UL (ref 2–7.15)
NEUTROPHILS NFR BLD: 67 % (ref 44–72)
NRBC # BLD AUTO: 0 K/UL
NRBC BLD-RTO: 0 /100 WBC
PHOSPHATE SERPL-MCNC: 2.1 MG/DL (ref 2.5–4.5)
PLATELET # BLD AUTO: 343 K/UL (ref 164–446)
PLATELET BLD QL SMEAR: NORMAL
PMV BLD AUTO: 9.9 FL (ref 9–12.9)
POTASSIUM SERPL-SCNC: 3.6 MMOL/L (ref 3.6–5.5)
PROT SERPL-MCNC: 6.5 G/DL (ref 6–8.2)
RBC # BLD AUTO: 4.14 M/UL (ref 4.2–5.4)
RBC BLD AUTO: PRESENT
SIGNIFICANT IND 70042: NORMAL
SITE SITE: NORMAL
SMUDGE CELLS BLD QL SMEAR: NORMAL
SODIUM SERPL-SCNC: 141 MMOL/L (ref 135–145)
SOURCE SOURCE: NORMAL
WBC # BLD AUTO: 10.3 K/UL (ref 4.8–10.8)

## 2018-01-15 PROCEDURE — 85027 COMPLETE CBC AUTOMATED: CPT

## 2018-01-15 PROCEDURE — 83735 ASSAY OF MAGNESIUM: CPT

## 2018-01-15 PROCEDURE — 80053 COMPREHEN METABOLIC PANEL: CPT

## 2018-01-15 PROCEDURE — 84100 ASSAY OF PHOSPHORUS: CPT

## 2018-01-15 PROCEDURE — 700102 HCHG RX REV CODE 250 W/ 637 OVERRIDE(OP): Performed by: FAMILY MEDICINE

## 2018-01-15 PROCEDURE — 700111 HCHG RX REV CODE 636 W/ 250 OVERRIDE (IP): Performed by: FAMILY MEDICINE

## 2018-01-15 PROCEDURE — 83880 ASSAY OF NATRIURETIC PEPTIDE: CPT

## 2018-01-15 PROCEDURE — A9270 NON-COVERED ITEM OR SERVICE: HCPCS | Performed by: HOSPITALIST

## 2018-01-15 PROCEDURE — 85007 BL SMEAR W/DIFF WBC COUNT: CPT

## 2018-01-15 PROCEDURE — 36415 COLL VENOUS BLD VENIPUNCTURE: CPT

## 2018-01-15 PROCEDURE — 99239 HOSP IP/OBS DSCHRG MGMT >30: CPT | Performed by: HOSPITALIST

## 2018-01-15 PROCEDURE — A9270 NON-COVERED ITEM OR SERVICE: HCPCS | Performed by: FAMILY MEDICINE

## 2018-01-15 PROCEDURE — 700102 HCHG RX REV CODE 250 W/ 637 OVERRIDE(OP): Performed by: HOSPITALIST

## 2018-01-15 RX ORDER — AZITHROMYCIN 250 MG/1
TABLET, FILM COATED ORAL
Qty: 3 TAB | Refills: 0 | Status: SHIPPED | OUTPATIENT
Start: 2018-01-16 | End: 2018-12-18

## 2018-01-15 RX ORDER — GUAIFENESIN 600 MG/1
600 TABLET, EXTENDED RELEASE ORAL EVERY 12 HOURS
Qty: 28 TAB | Refills: 0 | Status: SHIPPED | OUTPATIENT
Start: 2018-01-15 | End: 2018-12-18

## 2018-01-15 RX ORDER — AMOXICILLIN AND CLAVULANATE POTASSIUM 875; 125 MG/1; MG/1
1 TABLET, FILM COATED ORAL 2 TIMES DAILY
Qty: 8 TAB | Refills: 0 | Status: SHIPPED | OUTPATIENT
Start: 2018-01-15 | End: 2018-01-19

## 2018-01-15 RX ADMIN — OXYCODONE HYDROCHLORIDE 5 MG: 5 TABLET ORAL at 00:46

## 2018-01-15 RX ADMIN — OXYCODONE HYDROCHLORIDE 5 MG: 5 TABLET ORAL at 03:50

## 2018-01-15 RX ADMIN — AZITHROMYCIN 500 MG: 250 TABLET, FILM COATED ORAL at 09:30

## 2018-01-15 RX ADMIN — ENOXAPARIN SODIUM 40 MG: 100 INJECTION SUBCUTANEOUS at 09:29

## 2018-01-15 RX ADMIN — LEVOTHYROXINE SODIUM 125 MCG: 125 TABLET ORAL at 09:32

## 2018-01-15 RX ADMIN — ACETAMINOPHEN 650 MG: 325 TABLET, FILM COATED ORAL at 09:30

## 2018-01-15 RX ADMIN — TIZANIDINE 4 MG: 4 TABLET ORAL at 09:30

## 2018-01-15 RX ADMIN — CEFTRIAXONE 2 G: 2 INJECTION, POWDER, FOR SOLUTION INTRAMUSCULAR; INTRAVENOUS at 10:24

## 2018-01-15 ASSESSMENT — ENCOUNTER SYMPTOMS
HEADACHES: 0
FOCAL WEAKNESS: 0
GASTROINTESTINAL NEGATIVE: 1
VOMITING: 0
WEAKNESS: 1
NECK PAIN: 0
EYES NEGATIVE: 1
WHEEZING: 1
BACK PAIN: 0
SHORTNESS OF BREATH: 1
SPUTUM PRODUCTION: 1
DIZZINESS: 0
NERVOUS/ANXIOUS: 1
BRUISES/BLEEDS EASILY: 0
NAUSEA: 0
MUSCULOSKELETAL NEGATIVE: 1
FEVER: 1
PALPITATIONS: 0
POLYDIPSIA: 0
CARDIOVASCULAR NEGATIVE: 1
DEPRESSION: 0
HEARTBURN: 0
COUGH: 1

## 2018-01-15 ASSESSMENT — PAIN SCALES - GENERAL
PAINLEVEL_OUTOF10: 0
PAINLEVEL_OUTOF10: 3
PAINLEVEL_OUTOF10: 7
PAINLEVEL_OUTOF10: 0
PAINLEVEL_OUTOF10: 0

## 2018-01-15 NOTE — DISCHARGE INSTRUCTIONS
Discharge Instructions    Discharged to home by car with relative. Discharged via wheelchair, hospital escort: Yes.  Special equipment needed: Wheelchair    Be sure to schedule a follow-up appointment with your primary care doctor or any specialists as instructed.     Discharge Plan:   Diet Plan: Discussed  Activity Level: Discussed  Confirmed Symptoms Management: Discussed  Medication Reconciliation Updated: Yes  Influenza Vaccine Indication: Not indicated: Previously immunized this influenza season and > 8 years of age    I understand that a diet low in cholesterol, fat, and sodium is recommended for good health. Unless I have been given specific instructions below for another diet, I accept this instruction as my diet prescription.   Other diet: General    Special Instructions: None    · Is patient discharged on Warfarin / Coumadin?   No     · Is patient Post Blood Transfusion?  No      Urinary Tract Infection  Urinary tract infections (UTIs) can develop anywhere along your urinary tract. Your urinary tract is your body's drainage system for removing wastes and extra water. Your urinary tract includes two kidneys, two ureters, a bladder, and a urethra. Your kidneys are a pair of bean-shaped organs. Each kidney is about the size of your fist. They are located below your ribs, one on each side of your spine.  CAUSES  Infections are caused by microbes, which are microscopic organisms, including fungi, viruses, and bacteria. These organisms are so small that they can only be seen through a microscope. Bacteria are the microbes that most commonly cause UTIs.  SYMPTOMS   Symptoms of UTIs may vary by age and gender of the patient and by the location of the infection. Symptoms in young women typically include a frequent and intense urge to urinate and a painful, burning feeling in the bladder or urethra during urination. Older women and men are more likely to be tired, shaky, and weak and have muscle aches and abdominal  pain. A fever may mean the infection is in your kidneys. Other symptoms of a kidney infection include pain in your back or sides below the ribs, nausea, and vomiting.  DIAGNOSIS  To diagnose a UTI, your caregiver will ask you about your symptoms. Your caregiver also will ask to provide a urine sample. The urine sample will be tested for bacteria and white blood cells. White blood cells are made by your body to help fight infection.  TREATMENT   Typically, UTIs can be treated with medication. Because most UTIs are caused by a bacterial infection, they usually can be treated with the use of antibiotics. The choice of antibiotic and length of treatment depend on your symptoms and the type of bacteria causing your infection.  HOME CARE INSTRUCTIONS  · If you were prescribed antibiotics, take them exactly as your caregiver instructs you. Finish the medication even if you feel better after you have only taken some of the medication.  · Drink enough water and fluids to keep your urine clear or pale yellow.  · Avoid caffeine, tea, and carbonated beverages. They tend to irritate your bladder.  · Empty your bladder often. Avoid holding urine for long periods of time.  · Empty your bladder before and after sexual intercourse.  · After a bowel movement, women should cleanse from front to back. Use each tissue only once.  SEEK MEDICAL CARE IF:   · You have back pain.  · You develop a fever.  · Your symptoms do not begin to resolve within 3 days.  SEEK IMMEDIATE MEDICAL CARE IF:   · You have severe back pain or lower abdominal pain.  · You develop chills.  · You have nausea or vomiting.  · You have continued burning or discomfort with urination.  MAKE SURE YOU:   · Understand these instructions.  · Will watch your condition.  · Will get help right away if you are not doing well or get worse.     This information is not intended to replace advice given to you by your health care provider. Make sure you discuss any questions you  have with your health care provider.     Document Released: 09/27/2006 Document Revised: 01/08/2016 Document Reviewed: 01/25/2013  [employer] Patient Education ©2016 [employer] Inc.    Azithromycin tablets  What is this medicine?  AZITHROMYCIN (az ith joanne MULLINS sin) is a macrolide antibiotic. It is used to treat or prevent certain kinds of bacterial infections. It will not work for colds, flu, or other viral infections.  This medicine may be used for other purposes; ask your health care provider or pharmacist if you have questions.  COMMON BRAND NAME(S): Zithromax Tri-Michael, Zithromax Z-Michael, Zithromax  What should I tell my health care provider before I take this medicine?  They need to know if you have any of these conditions:  -kidney disease  -liver disease  -irregular heartbeat or heart disease  -an unusual or allergic reaction to azithromycin, erythromycin, other macrolide antibiotics, foods, dyes, or preservatives  -pregnant or trying to get pregnant  -breast-feeding  How should I use this medicine?  Take this medicine by mouth with a full glass of water. Follow the directions on the prescription label. The tablets can be taken with food or on an empty stomach. If the medicine upsets your stomach, take it with food. Take your medicine at regular intervals. Do not take your medicine more often than directed. Take all of your medicine as directed even if you think your are better. Do not skip doses or stop your medicine early. Talk to your pediatrician regarding the use of this medicine in children. Special care may be needed.  Overdosage: If you think you have taken too much of this medicine contact a poison control center or emergency room at once.  NOTE: This medicine is only for you. Do not share this medicine with others.  What if I miss a dose?  If you miss a dose, take it as soon as you can. If it is almost time for your next dose, take only that dose. Do not take double or extra doses.  What may  interact with this medicine?  Do not take this medicine with any of the following medications:  -lincomycin  This medicine may also interact with the following medications:  -amiodarone  -antacids  -cyclosporine  -digoxin  -dihydroergotamine or ergotamine  -magnesium  -nelfinavir  -phenytoin  -warfarin  This list may not describe all possible interactions. Give your health care provider a list of all the medicines, herbs, non-prescription drugs, or dietary supplements you use. Also tell them if you smoke, drink alcohol, or use illegal drugs. Some items may interact with your medicine.  What should I watch for while using this medicine?  Tell your doctor or health care professional if your symptoms do not improve.  Do not treat diarrhea with over the counter products. Contact your doctor if you have diarrhea that lasts more than 2 days or if it is severe and watery.  This medicine can make you more sensitive to the sun. Keep out of the sun. If you cannot avoid being in the sun, wear protective clothing and use sunscreen. Do not use sun lamps or tanning beds/booths.  What side effects may I notice from receiving this medicine?  Side effects that you should report to your doctor or health care professional as soon as possible:  -allergic reactions like skin rash, itching or hives, swelling of the face, lips, or tongue  -confusion, nightmares or hallucinations  -dark urine  -difficulty breathing  -hearing loss  -irregular heartbeat or chest pain  -pain or difficulty passing urine  -redness, blistering, peeling or loosening of the skin, including inside the mouth  -white patches or sores in the mouth  -yellowing of the eyes or skin  Side effects that usually do not require medical attention (report to your doctor or health care professional if they continue or are bothersome):  -diarrhea  -dizziness, drowsiness  -headache  -stomach upset or vomiting  -tooth discoloration  -vaginal irritation  This list may not describe  all possible side effects. Call your doctor for medical advice about side effects. You may report side effects to FDA at 1-472-FDA-0207.  Where should I keep my medicine?  Keep out of the reach of children.  Store at room temperature between 15 and 30 degrees C (59 and 86 degrees F). Throw away any unused medicine after the expiration date.  NOTE: This sheet is a summary. It may not cover all possible information. If you have questions about this medicine, talk to your doctor, pharmacist, or health care provider.  © 2014, Elsevier/[employer] Standard. (3/10/2009 1:50:13 PM)    Amoxicillin capsules or tablets  What is this medicine?  AMOXICILLIN (a mox i TARI in) is a penicillin antibiotic. It is used to treat certain kinds of bacterial infections. It will not work for colds, flu, or other viral infections.  This medicine may be used for other purposes; ask your health care provider or pharmacist if you have questions.  COMMON BRAND NAME(S): Amoxil, Moxilin , Sumox, Trimox  What should I tell my health care provider before I take this medicine?  They need to know if you have any of these conditions:  -asthma  -kidney disease  -an unusual or allergic reaction to amoxicillin, other penicillins, cephalosporin antibiotics, other medicines, foods, dyes, or preservatives  -pregnant or trying to get pregnant  -breast-feeding  How should I use this medicine?  Take this medicine by mouth with a glass of water. Follow the directions on your prescription label. You may take this medicine with food or on an empty stomach. Take your medicine at regular intervals. Do not take your medicine more often than directed. Take all of your medicine as directed even if you think your are better. Do not skip doses or stop your medicine early.  Talk to your pediatrician regarding the use of this medicine in children. While this drug may be prescribed for selected conditions, precautions do apply.  Overdosage: If you think you have taken too much of  this medicine contact a poison control center or emergency room at once.  NOTE: This medicine is only for you. Do not share this medicine with others.  What if I miss a dose?  If you miss a dose, take it as soon as you can. If it is almost time for your next dose, take only that dose. Do not take double or extra doses.  What may interact with this medicine?  -amiloride  -birth control pills  -chloramphenicol  -macrolides  -probenecid  -sulfonamides  -tetracyclines  This list may not describe all possible interactions. Give your health care provider a list of all the medicines, herbs, non-prescription drugs, or dietary supplements you use. Also tell them if you smoke, drink alcohol, or use illegal drugs. Some items may interact with your medicine.  What should I watch for while using this medicine?  Tell your doctor or health care professional if your symptoms do not improve in 2 or 3 days. Take all of the doses of your medicine as directed. Do not skip doses or stop your medicine early.  If you are diabetic, you may get a false positive result for sugar in your urine with certain brands of urine tests. Check with your doctor.  Do not treat diarrhea with over-the-counter products. Contact your doctor if you have diarrhea that lasts more than 2 days or if the diarrhea is severe and watery.  What side effects may I notice from receiving this medicine?  Side effects that you should report to your doctor or health care professional as soon as possible:  -allergic reactions like skin rash, itching or hives, swelling of the face, lips, or tongue  -breathing problems  -dark urine  -redness, blistering, peeling or loosening of the skin, including inside the mouth  -seizures  -severe or watery diarrhea  -trouble passing urine or change in the amount of urine  -unusual bleeding or bruising  -unusually weak or tired  -yellowing of the eyes or skin  Side effects that usually do not require medical attention (report to your  doctor or health care professional if they continue or are bothersome):  -dizziness  -headache  -stomach upset  -trouble sleeping  This list may not describe all possible side effects. Call your doctor for medical advice about side effects. You may report side effects to FDA at 3-607-FDA-1287.  Where should I keep my medicine?  Keep out of the reach of children.  Store between 68 and 77 degrees F (20 and 25 degrees C). Keep bottle closed tightly. Throw away any unused medicine after the expiration date.  NOTE: This sheet is a summary. It may not cover all possible information. If you have questions about this medicine, talk to your doctor, pharmacist, or health care provider.  © 2014, Elsevier/Gold Standard. (3/10/2009 2:10:59 PM)        Depression / Suicide Risk    As you are discharged from this [facility] facility, it is important to learn how to keep safe from harming yourself.    Recognize the warning signs:  · Abrupt changes in personality, positive or negative- including increase in energy   · Giving away possessions  · Change in eating patterns- significant weight changes-  positive or negative  · Change in sleeping patterns- unable to sleep or sleeping all the time   · Unwillingness or inability to communicate  · Depression  · Unusual sadness, discouragement and loneliness  · Talk of wanting to die  · Neglect of personal appearance   · Rebelliousness- reckless behavior  · Withdrawal from people/activities they love  · Confusion- inability to concentrate     If you or a loved one observes any of these behaviors or has concerns about self-harm, here's what you can do:  · Talk about it- your feelings and reasons for harming yourself  · Remove any means that you might use to hurt yourself (examples: pills, rope, extension cords, firearm)  · Get professional help from the community (Mental Health, Substance Abuse, psychological counseling)  · Do not be alone:Call your Safe Contact- someone whom you trust who  will be there for you.  · Call your local CRISIS HOTLINE 273-3865 or 083-289-3019  · Call your local Children's Mobile Crisis Response Team [place] (867) 967-0868 or www.[employer]  · Call the toll free National Suicide Prevention Hotlines   · National Suicide Prevention Lifeline 509-514-ZNGV (1834)  · National [employer] Line Network 800-SUICIDE (189-9807)

## 2018-01-16 NOTE — PROGRESS NOTES
Assumed care of patient at 0645, received bedside report from Yaniv Dowling. Pt currently resting comfortably in bed. Reports no needs at this time. Bed locked and in lowest position with call light in reach. Will continue to monitor.

## 2018-01-16 NOTE — PROGRESS NOTES
Pt currently sitting up in chair. Anxious about seeing the MD because she wants to D/C. Reports chronic back pain exacerbation due to the bed. Offered hot packs and ambulation. Will continue to monitor.

## 2018-01-16 NOTE — PROGRESS NOTES
Reviewed all D/C instructions and education. Pt prescriptions sent to home pharmacy. Answered all questions and concerns. Reviewed new prescribed medications including amoxicillin and azithromycin. Discontinued PIV (tip intact) and discontinued tele monitoring.  Pt AOX4, sating well on Ra. Brother is transporting patient. Called transport to escort patient down to [facility] via wheelchair.

## 2018-01-16 NOTE — PROGRESS NOTES
[facility] Hospitalist Progress Note    Date of Service: 1/15/2018    Chief Complaint  62 y.o. female admitted 2018 with Pneumonia and hypoxia    Interval Problem Update  Patient seen and examined today.    Patient tolerating treatment and therapies.  All Data, Medication data reviewed.  Case discussed with nursing as available.  Plan of Care reviewed with patient and notified of changes.   Pt feels ok, still cough and dyspnea and desaturation, feesl weak and has no appetite  1/15 Pt feels better, feels ok to d/c to home  Consultants/Specialty  N    Disposition  H        Review of Systems   Constitutional: Positive for fever and malaise/fatigue.   HENT: Negative.    Eyes: Negative.    Respiratory: Positive for cough, sputum production, shortness of breath and wheezing.    Cardiovascular: Negative.  Negative for chest pain and palpitations.   Gastrointestinal: Negative.  Negative for heartburn, nausea and vomiting.   Genitourinary: Negative.  Negative for dysuria and frequency.   Musculoskeletal: Negative.  Negative for back pain and neck pain.   Skin: Negative.  Negative for itching and rash.   Neurological: Positive for weakness. Negative for dizziness, focal weakness and headaches.   Endo/Heme/Allergies: Negative.  Negative for polydipsia. Does not bruise/bleed easily.   Psychiatric/Behavioral: Negative for depression. The patient is nervous/anxious.       Physical Exam  Laboratory/Imaging   Hemodynamics  Temp (24hrs), Av.4 °C (97.5 °F), Min:36.1 °C (97 °F), Max:36.9 °C (98.4 °F)   Temperature: 36.2 °C (97.1 °F)  Pulse  Av.8  Min: 64  Max: 113    Blood Pressure: 137/77      Respiratory      Respiration: 16, Pulse Oximetry: 92 %     Work Of Breathing / Effort: Mild  RUL Breath Sounds: Diminished;Expiratory Wheezes, RML Breath Sounds: Diminished;Expiratory Wheezes, RLL Breath Sounds: Diminished;Expiratory Wheezes, NASIM Breath Sounds: Diminished;Expiratory Wheezes, LLL Breath Sounds:  Diminished;Expiratory Wheezes    Fluids    Intake/Output Summary (Last 24 hours) at 01/15/18 1626  Last data filed at 01/15/18 1100   Gross per 24 hour   Intake              600 ml   Output              400 ml   Net              200 ml       Nutrition  Orders Placed This Encounter   Procedures   • Diet Order     Standing Status:   Standing     Number of Occurrences:   1     Order Specific Question:   Diet:     Answer:   Regular [1]     Physical Exam   Constitutional: She is oriented to person, place, and time. She appears well-developed and well-nourished. She appears lethargic. She has a sickly appearance. No distress.   HENT:   Head: Normocephalic and atraumatic.   Nose: Nose normal.   Mouth/Throat: Oropharynx is clear and moist.   Eyes: Conjunctivae and EOM are normal. Pupils are equal, round, and reactive to light.   Neck: Normal range of motion. Neck supple. No JVD present. No thyromegaly present.   Cardiovascular: Normal rate, regular rhythm and normal heart sounds.  Exam reveals no gallop and no friction rub.    Pulmonary/Chest: Effort normal. No respiratory distress. She has wheezes. She has rales.   Abdominal: Soft. Bowel sounds are normal. She exhibits no distension and no mass. There is no tenderness. There is no rebound and no guarding.   Musculoskeletal: Normal range of motion. She exhibits no edema or tenderness.   Lymphadenopathy:     She has no cervical adenopathy.   Neurological: She is oriented to person, place, and time. She appears lethargic. No cranial nerve deficit.   Skin: Skin is warm and dry. She is not diaphoretic.   Psychiatric: She has a normal mood and affect. Her behavior is normal.   Nursing note and vitals reviewed.      Recent Labs      01/13/18   1427  01/14/18   0315  01/15/18   0036   WBC  11.9*  10.1  10.3   RBC  4.71  4.00*  4.14*   HEMOGLOBIN  12.6  10.6*  10.9*   HEMATOCRIT  38.5  32.9*  34.4*   MCV  81.7  82.3  83.1   MCH  26.8*  26.5*  26.3*   MCHC  32.7*  32.2*  31.7*   RDW   43.5  43.8  44.2   PLATELETCT  365  301  343   MPV  9.5  9.8  9.9     Recent Labs      01/13/18   1427  01/14/18   0315  01/15/18   0036   SODIUM  139  144  141   POTASSIUM  3.6  3.6  3.6   CHLORIDE  103  112  110   CO2  25  22  22   GLUCOSE  100*  91  77   BUN  34*  17  13   CREATININE  0.69  0.45*  0.62   CALCIUM  8.5  7.7*  8.3*     Recent Labs      01/13/18   1427   APTT  36.0   INR  1.09     Recent Labs      01/13/18   1516  01/13/18   1802  01/15/18   0036   BNPBTYPENAT  10  12  252*              Assessment/Plan     * Pneumonia- (present on admission)   Assessment & Plan    IV azithromycin and Rocephin  Culture to be collected        Pyuria- (present on admission)   Assessment & Plan    Follow culture        Hypothyroid- (present on admission)   Assessment & Plan    Hold levothyroxine, TSH is low        HTN (hypertension)- (present on admission)   Assessment & Plan    Hold current medications        Appetite loss- (present on admission)   Assessment & Plan    Likely illness related            Reviewed items::  Radiology images reviewed, Labs reviewed and Medications reviewed  Deutsch catheter::  No Deutsch  DVT prophylaxis pharmacological::  Enoxaparin (Lovenox)  Antibiotics:  Treating active infection/contamination beyond 24 hours perioperative coverage      Plan   Much improved   Ok for d/c

## 2018-01-16 NOTE — DISCHARGE SUMMARY
DATE OF ADMISSION:  01/13/2018.    DATE OF DISCHARGE:  01/15/2018.    DISCHARGE DISPOSITION:  To home.    DISCHARGE FOLLOWUP:  Closely with primary care physician, Dr. Tabitha Quintero.    DISCHARGE CONDITION:  Medically stable and improved.    DISCHARGE DIAGNOSES:  1.  Status post evaluation and treatment for community-acquired pneumonia and   this is improved.  2.  Status post treatment for urinary tract infection.  This is also improved.    She had negative cultures throughout the hospitalization.  3.  Hypothyroidism per ongoing outpatient followup.  4.  Hypertension.  5.  Loss of appetite likely secondary to her acute illness.  6.  History of gastric bypass surgery.  7.  Appendectomy.  8.  History of tonsillectomy.    DISCHARGE MEDICATIONS:  As follows:  1.  Carole 5/40 one tablet p.o. daily.  2.  Augmentin 875/125, one tab p.o. b.i.d. for 4 more days.  3.  Azithromycin 250 mg p.o. daily x3 more days.  4.  Mucinex 600 mg p.o. b.i.d.  5.  Microzide 12.5 mg p.o. daily.  6.  Levothyroxine 125 mcg p.o. daily.  7.  Mevacor 20 mg daily.  8.  Zanaflex 4 mg q. 6 hours p.r.n.    For presenting symptoms, HPI and physical findings, please refer to the   dictated H and P.    HOSPITAL COURSE:  Patient was admitted with cough, dyspnea, and evidence of   pneumonia.  The patient had some mild respiratory insufficiency failure with   hypoxemia, dysrhythmia improved.  Her initial sepsis concern was ruled out.    She did have some pyuria, which was fairly asymptomatic.  The patient   otherwise was treated medically.  She improved significantly, was off oxygen.    Her leukocytosis improved.  Her left shift also improved.  Overall, chemistry   panel was benign prior to discharge.  The patient tolerated ambulation   without further oxygen.  She did have a low TSH per outpatient followup.  At   this time, the patient is stable for discharge, close followup with her   primary care physician.  She did have a chest x-ray finding suspicious of    possible pulmonary edema, but superimposed pneumonia on the right was not   excluded, which was likely the case.  For full further details, please refer   to the computer system and paper chart.    DIET:  As tolerated.    ACTIVITY:  As tolerated.    FOLLOWUP:  Follow up closely within the next 7-10 days with her primary care   physician.    Time spent on discharge 45 minutes.       ____________________________________     LEE BROUSSARD MD    SS / NTS    DD:  01/15/2018 16:33:58  DT:  01/16/2018 00:26:12    D#:  [id number]  Job#:  [id number]    cc: CURT HERNANDEZ MD

## 2018-01-18 LAB
BACTERIA BLD CULT: NORMAL
BACTERIA BLD CULT: NORMAL
SIGNIFICANT IND 70042: NORMAL
SIGNIFICANT IND 70042: NORMAL
SITE SITE: NORMAL
SITE SITE: NORMAL
SOURCE SOURCE: NORMAL
SOURCE SOURCE: NORMAL

## 2018-01-30 ENCOUNTER — HOSPITAL ENCOUNTER (OUTPATIENT)
Dept: RADIOLOGY | Facility: MEDICAL CENTER | Age: 63
End: 2018-01-30

## 2018-02-01 ENCOUNTER — TELEPHONE (OUTPATIENT)
Dept: RADIOLOGY | Facility: MEDICAL CENTER | Age: 63
End: 2018-02-01

## 2018-02-02 ENCOUNTER — HOSPITAL ENCOUNTER (OUTPATIENT)
Dept: RADIOLOGY | Facility: MEDICAL CENTER | Age: 63
End: 2018-02-02
Attending: NURSE PRACTITIONER
Payer: COMMERCIAL

## 2018-02-02 DIAGNOSIS — R92.8 ABNORMAL MAMMOGRAM: ICD-10-CM

## 2018-02-02 PROCEDURE — 76642 ULTRASOUND BREAST LIMITED: CPT | Mod: RT

## 2018-02-02 PROCEDURE — G0279 TOMOSYNTHESIS, MAMMO: HCPCS | Mod: RT

## 2018-12-18 ENCOUNTER — OFFICE VISIT (OUTPATIENT)
Dept: MEDICAL GROUP | Facility: MEDICAL CENTER | Age: 63
End: 2018-12-18
Payer: MEDICARE

## 2018-12-18 VITALS
OXYGEN SATURATION: 92 % | HEART RATE: 90 BPM | RESPIRATION RATE: 16 BRPM | DIASTOLIC BLOOD PRESSURE: 80 MMHG | SYSTOLIC BLOOD PRESSURE: 122 MMHG | TEMPERATURE: 97.4 F | HEIGHT: 62 IN | BODY MASS INDEX: 35.97 KG/M2

## 2018-12-18 DIAGNOSIS — E78.2 MIXED HYPERLIPIDEMIA: ICD-10-CM

## 2018-12-18 DIAGNOSIS — M85.80 OSTEOPENIA, UNSPECIFIED LOCATION: ICD-10-CM

## 2018-12-18 DIAGNOSIS — E03.9 ACQUIRED HYPOTHYROIDISM: ICD-10-CM

## 2018-12-18 DIAGNOSIS — M54.50 CHRONIC MIDLINE LOW BACK PAIN WITHOUT SCIATICA: ICD-10-CM

## 2018-12-18 DIAGNOSIS — G89.29 CHRONIC MIDLINE LOW BACK PAIN WITHOUT SCIATICA: ICD-10-CM

## 2018-12-18 DIAGNOSIS — I10 ESSENTIAL HYPERTENSION: ICD-10-CM

## 2018-12-18 DIAGNOSIS — Z00.00 HEALTH CARE MAINTENANCE: ICD-10-CM

## 2018-12-18 PROBLEM — R63.0 APPETITE LOSS: Status: RESOLVED | Noted: 2018-01-14 | Resolved: 2018-12-18

## 2018-12-18 PROBLEM — R82.81 PYURIA: Status: RESOLVED | Noted: 2018-01-13 | Resolved: 2018-12-18

## 2018-12-18 PROBLEM — J18.9 PNEUMONIA: Status: RESOLVED | Noted: 2018-01-13 | Resolved: 2018-12-18

## 2018-12-18 PROCEDURE — 99204 OFFICE O/P NEW MOD 45 MIN: CPT | Performed by: FAMILY MEDICINE

## 2018-12-18 RX ORDER — LEVOTHYROXINE SODIUM 0.12 MG/1
125 TABLET ORAL DAILY
Qty: 90 TAB | Refills: 3 | Status: SHIPPED | OUTPATIENT
Start: 2018-12-18 | End: 2019-12-05 | Stop reason: SDUPTHER

## 2018-12-18 RX ORDER — AMLODIPINE AND OLMESARTAN MEDOXOMIL 5; 40 MG/1; MG/1
1 TABLET ORAL DAILY
Qty: 90 TAB | Refills: 3 | Status: SHIPPED | OUTPATIENT
Start: 2018-12-18 | End: 2020-01-20

## 2018-12-18 RX ORDER — LOVASTATIN 20 MG/1
20 TABLET ORAL DAILY
Qty: 90 TAB | Refills: 3 | Status: SHIPPED | OUTPATIENT
Start: 2018-12-18 | End: 2020-01-20

## 2018-12-18 RX ORDER — HYDROCHLOROTHIAZIDE 12.5 MG/1
12.5 CAPSULE, GELATIN COATED ORAL DAILY
Qty: 90 CAP | Refills: 3 | Status: SHIPPED | OUTPATIENT
Start: 2018-12-18 | End: 2019-12-05 | Stop reason: SDUPTHER

## 2018-12-18 NOTE — LETTER
[facility]  Zahra Paz M.D.  [address] [facility] [address]  [place] [address]  Fax: [phone]   Authorization for Release/Disclosure of   Protected Health Information   Name: DELORIS BRANDT : 1955 SSN: [id number]   Address: [address] [place] [address] [place] [address] Phone:    [phone] (home)    I authorize the entity listed below to release/disclose the PHI below to:   [facility]/Zahra Paz M.D. and Zahra Paz M.D.   Provider or Entity Name:  Tabitha segovia    Address   City, State, [facility]   Phone:      Fax:     Reason for request: continuity of care   Information to be released:    [  ] LAST COLONOSCOPY,  including any PATH REPORT and follow-up  [  ] LAST FIT/COLOGUARD RESULT [  ] LAST DEXA  [  ] LAST MAMMOGRAM  [  ] LAST PAP  [  ] LAST LABS [  ] RETINA EXAM REPORT  [  ] IMMUNIZATION RECORDS  [  ] Release all info      [  ] Check here and initial the line next to each item to release ALL health information INCLUDING  _____ Care and treatment for drug and / or alcohol abuse  _____ HIV testing, infection status, or AIDS  _____ Genetic Testing    DATES OF SERVICE OR TIME PERIOD TO BE DISCLOSED: _____________  I understand and acknowledge that:  * This Authorization may be revoked at any time by you in writing, except if your health information has already been used or disclosed.  * Your health information that will be used or disclosed as a result of you signing this authorization could be re-disclosed by the recipient. If this occurs, your re-disclosed health information may no longer be protected by State or Federal laws.  * You may refuse to sign this Authorization. Your refusal will not affect your ability to obtain treatment.  * This Authorization becomes effective upon signing and will  on (date) __________.      If no date is indicated, this Authorization will  one (1) year from the signature date.    Name: Deloris Brandt    Signature:   Date:     2018       PLEASE FAX REQUESTED  RECORDS BACK TO: [phone]

## 2018-12-19 NOTE — PROGRESS NOTES
CC: New patient: Hypertension, hypothyroidism, hyperlipidemia, osteopenia.    HPI:  Deloris presents today to establish new PCP relationship.    Patient has been active and independent with all ADLs.  Has the following chronic medical issues:    Essential hypertension  Blood pressure has been adequately controlled.  Denies headache, chest pain, shortness of breath.  Patient has been on amlodipine-Olmesartan 5-40 mg daily, and hydrochlorothiazide 12.5 mg daily.  No side effects    Acquired hypothyroidism  She has been tolerating Levothyroxine, no palpitation, no cold or heat intolerance, has been levothyroxine 125 mcg daily.    Mixed hyperlipidemia  She has been tolerating the statin. Denies muscle pain LFTs has been normal, currently she is on lovastatin 20 mg daily.  No history of diabetes, stroke, CAD.    Osteopenia, unspecified location  Bone density scan was done in 2014 showed osteopenia.  Patient has been on calcium and vitamin D.  No history of fractures.    Chronic back pain   Patient has chronic back pain, underwent back surgery a while ago.  She is currently on over-the-counter pain medication.    As per patient flu shot and pneumonia shot are up-to-date.    Patient Active Problem List    Diagnosis Date Noted   • Hypothyroid 01/13/2018     Priority: Medium   • Mixed hyperlipidemia 12/18/2018   • Essential hypertension 12/18/2018   • Osteopenia 12/18/2018       Current Outpatient Prescriptions   Medication Sig Dispense Refill   • Amlodipine-Olmesartan (RYAN) 5-40 MG Tab Take 1 Tab by mouth every day. 90 Tab 3   • hydrochlorothiazide (MICROZIDE) 12.5 MG capsule Take 1 Cap by mouth every day. 90 Cap 3   • levothyroxine (SYNTHROID) 125 MCG Tab Take 1 Tab by mouth every day. 90 Tab 3   • lovastatin (MEVACOR) 20 MG Tab Take 1 Tab by mouth every day. 90 Tab 3   • tizanidine (ZANAFLEX) 4 MG Tab Take 4 mg by mouth every 8 hours.     • azithromycin (ZITHROMAX) 250 MG Tab ! Tab Po x 3 days 3 Tab 0   • guaifenesin LA  "(MUCINEX) 600 MG TABLET SR 12 HR Take 1 Tab by mouth every 12 hours. 28 Tab 0     No current facility-administered medications for this visit.          Allergies as of 12/18/2018   • (No Known Allergies)        Social History     Social History   • Marital status: Single     Spouse name: N/A   • Number of children: N/A   • Years of education: N/A     Occupational History   • Not on file.     Social History Main Topics   • Smoking status: Never Smoker   • Smokeless tobacco: Never Used   • Alcohol use No   • Drug use: No   • Sexual activity: Not Currently     Other Topics Concern   • Not on file     Social History Narrative   • No narrative on file       Family History   Problem Relation Age of Onset   • Family history unknown: Yes       Past Surgical History:   Procedure Laterality Date   • OTHER ABDOMINAL SURGERY  01/01/2001    Gastric bypass   • APPENDECTOMY  48 years ago   • OTHER      tonsilectomy       ROS:  Denies any Headache, Blurred Vision, Confusion Chest pain,  Shortness of breath,  Abdominal pain, Changes of bowel or bladder, Lower ext edema, Fevers, Nights sweats, Weight Changes, Focal weakness or numbness.  All other systems are negative.    /80 (BP Location: Right arm, Patient Position: Sitting, BP Cuff Size: Adult)   Pulse 90   Temp 36.3 °C (97.4 °F) (Temporal)   Resp 16   Ht 1.575 m (5' 2\")   SpO2 92%   BMI 35.97 kg/m²     Physical Exam:  Gen:         Alert and oriented, No apparent distress.  HEENT:   Perrla, TM clear,  Oralpharynx no erythema or exudates.  Neck:       No Jugular venous distension, Lymphadenopathy, Thyromegaly, Bruits.  Lungs:     Clear to auscultation bilaterally  CV:          Regular rate and rhythm. No murmurs, rubs or gallops.  Abd:         Soft non tender, non distended. Normal active bowel sounds. No                                        Hepatosplenomegaly, No pulsatile masses.  Ext:          No clubbing, cyanosis, edema.      Assessment and Plan.   63 y.o. female "     1. Essential hypertension  Adequately controlled.  Continue on amlodipine-Olmesartan 5-40 mg daily.    - CBC WITH DIFFERENTIAL; Future  - COMP METABOLIC PANEL; Future  - Amlodipine-Olmesartan (RYAN) 5-40 MG Tab; Take 1 Tab by mouth every day.  Dispense: 90 Tab; Refill: 3  - hydrochlorothiazide (MICROZIDE) 12.5 MG capsule; Take 1 Cap by mouth every day.  Dispense: 90 Cap; Refill: 3    2. Acquired hypothyroidism  He has been tolerating Levothyroxine, no palpitation, no cold or heat intolerance  Continue levothyroxine 125 mcg daily.    - TSH+FREE T4  - levothyroxine (SYNTHROID) 125 MCG Tab; Take 1 Tab by mouth every day.  Dispense: 90 Tab; Refill: 3    3. Mixed hyperlipidemia  He has been tolerating the statin. Denies muscle pain LFTs has been normal  Continue lovastatin 20 mg daily.    - LIPID PANEL  - lovastatin (MEVACOR) 20 MG Tab; Take 1 Tab by mouth every day.  Dispense: 90 Tab; Refill: 3    4. Osteopenia, unspecified location  Bone density scan was done in 2014 showed osteopenia.  Patient has been on calcium and vitamin D.  No history of fractures.    5. Chronic back pain   Status post back surgery a while ago.    Continue on over-the-counter pain medication as needed.    6. Health care maintenance  As per patient flu shot and pneumonia shot are up-to-date.

## 2018-12-23 DIAGNOSIS — E03.9 ACQUIRED HYPOTHYROIDISM: ICD-10-CM

## 2018-12-23 LAB
ALBUMIN SERPL-MCNC: 4.1 G/DL (ref 3.6–4.8)
ALBUMIN/GLOB SERPL: 1.3 {RATIO} (ref 1.2–2.2)
ALP SERPL-CCNC: 113 IU/L (ref 39–117)
ALT SERPL-CCNC: 15 IU/L (ref 0–32)
AST SERPL-CCNC: 16 IU/L (ref 0–40)
BASOPHILS # BLD AUTO: 0.1 X10E3/UL (ref 0–0.2)
BASOPHILS NFR BLD AUTO: 1 %
BILIRUB SERPL-MCNC: 0.2 MG/DL (ref 0–1.2)
BUN SERPL-MCNC: 19 MG/DL (ref 8–27)
BUN/CREAT SERPL: 20 (ref 12–28)
CALCIUM SERPL-MCNC: 9.5 MG/DL (ref 8.7–10.3)
CHLORIDE SERPL-SCNC: 106 MMOL/L (ref 96–106)
CHOLEST SERPL-MCNC: 198 MG/DL (ref 100–199)
CO2 SERPL-SCNC: 24 MMOL/L (ref 20–29)
CREAT SERPL-MCNC: 0.97 MG/DL (ref 0.57–1)
EOSINOPHIL # BLD AUTO: 0.4 X10E3/UL (ref 0–0.4)
EOSINOPHIL NFR BLD AUTO: 4 %
ERYTHROCYTE [DISTWIDTH] IN BLOOD BY AUTOMATED COUNT: 14.4 % (ref 12.3–15.4)
GLOBULIN SER CALC-MCNC: 3.1 G/DL (ref 1.5–4.5)
GLUCOSE SERPL-MCNC: 85 MG/DL (ref 65–99)
HCT VFR BLD AUTO: 41.5 % (ref 34–46.6)
HDLC SERPL-MCNC: 39 MG/DL
HGB BLD-MCNC: 13.4 G/DL (ref 11.1–15.9)
IF AFRICAN AMERICAN  100797: 71 ML/MIN/1.73
IF NON AFRICAN AMER 100791: 61 ML/MIN/1.73
IMM GRANULOCYTES # BLD: 0 X10E3/UL (ref 0–0.1)
IMM GRANULOCYTES NFR BLD: 0 %
IMMATURE CELLS  115398: ABNORMAL
LABORATORY COMMENT REPORT: ABNORMAL
LDLC SERPL CALC-MCNC: 131 MG/DL (ref 0–99)
LYMPHOCYTES # BLD AUTO: 2.2 X10E3/UL (ref 0.7–3.1)
LYMPHOCYTES NFR BLD AUTO: 27 %
MCH RBC QN AUTO: 28.3 PG (ref 26.6–33)
MCHC RBC AUTO-ENTMCNC: 32.3 G/DL (ref 31.5–35.7)
MCV RBC AUTO: 88 FL (ref 79–97)
MONOCYTES # BLD AUTO: 1 X10E3/UL (ref 0.1–0.9)
MONOCYTES NFR BLD AUTO: 12 %
MORPHOLOGY BLD-IMP: ABNORMAL
NEUTROPHILS # BLD AUTO: 4.7 X10E3/UL (ref 1.4–7)
NEUTROPHILS NFR BLD AUTO: 56 %
NRBC BLD AUTO-RTO: ABNORMAL %
PLATELET # BLD AUTO: 366 X10E3/UL (ref 150–379)
POTASSIUM SERPL-SCNC: 4 MMOL/L (ref 3.5–5.2)
PROT SERPL-MCNC: 7.2 G/DL (ref 6–8.5)
RBC # BLD AUTO: 4.73 X10E6/UL (ref 3.77–5.28)
SODIUM SERPL-SCNC: 145 MMOL/L (ref 134–144)
T4 FREE SERPL-MCNC: 1.03 NG/DL (ref 0.82–1.77)
TRIGL SERPL-MCNC: 141 MG/DL (ref 0–149)
TSH SERPL DL<=0.005 MIU/L-ACNC: 15.38 UIU/ML (ref 0.45–4.5)
VLDLC SERPL CALC-MCNC: 28 MG/DL (ref 5–40)
WBC # BLD AUTO: 8.3 X10E3/UL (ref 3.4–10.8)

## 2018-12-23 RX ORDER — LEVOTHYROXINE SODIUM 137 UG/1
TABLET ORAL
Qty: 30 TAB | Refills: 0 | Status: SHIPPED | OUTPATIENT
Start: 2018-12-23 | End: 2019-03-23 | Stop reason: SDUPTHER

## 2019-01-07 ENCOUNTER — TELEPHONE (OUTPATIENT)
Dept: MEDICAL GROUP | Facility: MEDICAL CENTER | Age: 64
End: 2019-01-07

## 2019-01-07 ENCOUNTER — TELEPHONE (OUTPATIENT)
Dept: SCHEDULING | Facility: IMAGING CENTER | Age: 64
End: 2019-01-07

## 2019-01-07 NOTE — TELEPHONE ENCOUNTER
1. Caller Name: Deloris Brandt                        Call Back Number: [phone] (home)     2. Message: Received a call from Centralized Scheduling pt is SOB refused 911 or ER tried to call pt back no answer.  Left Detailed Message to call Dr. Paz's office back.     3. Patient approves office to leave a detailed voicemail/MyChart message: yes

## 2019-01-07 NOTE — TELEPHONE ENCOUNTER
I advised the patient to go to Urgent Care or the ER for her shortness of breath she is having. She said okay and hung up.

## 2019-03-23 DIAGNOSIS — E03.9 ACQUIRED HYPOTHYROIDISM: ICD-10-CM

## 2019-03-24 RX ORDER — LEVOTHYROXINE SODIUM 137 UG/1
TABLET ORAL
Qty: 30 TAB | Refills: 0 | Status: SHIPPED | OUTPATIENT
Start: 2019-03-24 | End: 2019-04-23 | Stop reason: SDUPTHER

## 2019-04-23 ENCOUNTER — OFFICE VISIT (OUTPATIENT)
Dept: MEDICAL GROUP | Facility: MEDICAL CENTER | Age: 64
End: 2019-04-23
Payer: MEDICARE

## 2019-04-23 VITALS
HEART RATE: 82 BPM | RESPIRATION RATE: 16 BRPM | TEMPERATURE: 97.9 F | SYSTOLIC BLOOD PRESSURE: 122 MMHG | OXYGEN SATURATION: 91 % | HEIGHT: 62 IN | WEIGHT: 209 LBS | BODY MASS INDEX: 38.46 KG/M2 | DIASTOLIC BLOOD PRESSURE: 78 MMHG

## 2019-04-23 DIAGNOSIS — F51.04 CHRONIC INSOMNIA: ICD-10-CM

## 2019-04-23 DIAGNOSIS — I10 ESSENTIAL HYPERTENSION: ICD-10-CM

## 2019-04-23 DIAGNOSIS — E78.2 MIXED HYPERLIPIDEMIA: ICD-10-CM

## 2019-04-23 DIAGNOSIS — E03.9 ACQUIRED HYPOTHYROIDISM: ICD-10-CM

## 2019-04-23 PROCEDURE — 99214 OFFICE O/P EST MOD 30 MIN: CPT | Performed by: FAMILY MEDICINE

## 2019-04-23 RX ORDER — TRAZODONE HYDROCHLORIDE 50 MG/1
50 TABLET ORAL
Qty: 30 TAB | Refills: 3 | Status: SHIPPED | OUTPATIENT
Start: 2019-04-23 | End: 2019-07-11 | Stop reason: SDUPTHER

## 2019-04-23 RX ORDER — LEVOTHYROXINE SODIUM 137 UG/1
TABLET ORAL
Qty: 90 TAB | Refills: 1 | Status: SHIPPED | OUTPATIENT
Start: 2019-04-23 | End: 2020-07-07

## 2019-04-23 ASSESSMENT — PATIENT HEALTH QUESTIONNAIRE - PHQ9: CLINICAL INTERPRETATION OF PHQ2 SCORE: 0

## 2019-04-23 NOTE — PROGRESS NOTES
CC: Hypertension, hyperlipidemia, hypothyroidism, insomnia    HPI:   Deloris presents today to discuss the following medical issues:    Essential hypertension  Has been adequately controlled on current medication. Denies headache, chest pain, and SOB.  Patient has been on amlodipine-Olmesartan 5-40 mg daily, and hydrochlorothiazide 12.5 mg daily.  No side effects.    Mixed hyperlipidemia  He has been tolerating the statin. Denies muscle pain LFTs has been normal, has been on lovastatin 20 mg daily.    Acquired hypothyroidism  He has been tolerating Levothyroxine, no palpitation, no cold or heat intolerance. Last TSH was high(15.380), last checked in December 2018.Patient has been on levothyroxine 125 mcg on the weekdays, and 137 mcg on the weekends.      Chronic insomnia  Patient stated that she has been having trouble falling and staying asleep, she has been on over-the-counter sleep aids, lately has not been helping.    Patient Active Problem List    Diagnosis Date Noted   • Hypothyroid 01/13/2018     Priority: Medium   • Mixed hyperlipidemia 12/18/2018   • Essential hypertension 12/18/2018   • Osteopenia 12/18/2018       Current Outpatient Prescriptions   Medication Sig Dispense Refill   • traZODone (DESYREL) 50 MG Tab Take 1 Tab by mouth every bedtime. 30 Tab 3   • levothyroxine (SYNTHROID) 137 MCG Tab TAKE 1 TABLET BY MOUTH EVERY SATURDAY AND SUNDAY ONLY 90 Tab 1   • Amlodipine-Olmesartan (RYAN) 5-40 MG Tab Take 1 Tab by mouth every day. 90 Tab 3   • hydrochlorothiazide (MICROZIDE) 12.5 MG capsule Take 1 Cap by mouth every day. 90 Cap 3   • levothyroxine (SYNTHROID) 125 MCG Tab Take 1 Tab by mouth every day. 90 Tab 3   • lovastatin (MEVACOR) 20 MG Tab Take 1 Tab by mouth every day. 90 Tab 3   • tizanidine (ZANAFLEX) 4 MG Tab Take 4 mg by mouth every 8 hours.       No current facility-administered medications for this visit.          Allergies as of 04/23/2019   • (No Known Allergies)        ROS: Denies any chest  "pain, Shortness of breath, Changes bowel or bladder, Lower extremity edema.    Physical Exam:  /78 (BP Location: Right arm, Patient Position: Sitting, BP Cuff Size: Adult)   Pulse 82   Temp 36.6 °C (97.9 °F) (Temporal)   Resp 16   Ht 1.575 m (5' 2\")   Wt 94.8 kg (209 lb)   SpO2 91%   BMI 38.23 kg/m²   Gen.: Well-developed, well-nourished, no apparent distress,pleasant and cooperative with the examination  Skin:  Warm and dry with good turgor. No rashes or suspicious lesions in visible areas  HEENT:Sinuses nontender with palpation, TMs clear, nares patent with pink mucosa and clear rhinorrhea,no septal deviation ,polyps or lesions. lips without lesions, oropharynx clear.  Neck: Trachea midline,no masses or adenopathy. No JVD.  Cor: Regular rate and rhythm without murmur, gallop or rub.  Lungs: Respirations unlabored.Clear to auscultation with equal breath sounds bilaterally. No wheezes, rhonchi.  Extremities: No cyanosis, clubbing or edema.      Assessment and Plan.   63 y.o. female     1. Essential hypertension  Adequately controlled.  Continue on amlodipine-Olmesartan 5-40 mg daily, and hydrochlorothiazide 12.5 mg daily    2. Mixed hyperlipidemia  He has been tolerating the statin. Denies muscle pain LFTs has been normal  Continue on lovastatin 20 mg daily.    3. Acquired hypothyroidism  Last TSH was high.  Patient has been on levothyroxine 125 mcg on the weekdays, and 137 mcg on the weekends.  We will repeat TSH/free T4.    - TSH+FREE T4    4. Chronic insomnia  Has been having trouble falling and staying asleep, has been on over-the-counter sleep aids, lately has not been helping.  Will start patient on trazodone 50 mg nightly.    - traZODone (DESYREL) 50 MG Tab; Take 1 Tab by mouth every bedtime.  Dispense: 30 Tab; Refill: 3      "

## 2019-07-09 LAB
T4 FREE SERPL-MCNC: 1.51 NG/DL (ref 0.82–1.77)
TSH SERPL DL<=0.005 MIU/L-ACNC: 0.57 UIU/ML (ref 0.45–4.5)

## 2019-07-11 DIAGNOSIS — F51.04 CHRONIC INSOMNIA: ICD-10-CM

## 2019-07-11 RX ORDER — TRAZODONE HYDROCHLORIDE 50 MG/1
TABLET ORAL
Qty: 90 TAB | Refills: 3 | Status: SHIPPED | OUTPATIENT
Start: 2019-07-11 | End: 2020-08-17

## 2019-11-04 ENCOUNTER — OFFICE VISIT (OUTPATIENT)
Dept: MEDICAL GROUP | Facility: MEDICAL CENTER | Age: 64
End: 2019-11-04
Payer: MEDICARE

## 2019-11-04 ENCOUNTER — HOSPITAL ENCOUNTER (OUTPATIENT)
Dept: RADIOLOGY | Facility: MEDICAL CENTER | Age: 64
End: 2019-11-04
Attending: FAMILY MEDICINE
Payer: MEDICARE

## 2019-11-04 VITALS
RESPIRATION RATE: 16 BRPM | SYSTOLIC BLOOD PRESSURE: 110 MMHG | HEART RATE: 85 BPM | HEIGHT: 62 IN | OXYGEN SATURATION: 94 % | TEMPERATURE: 97.4 F | WEIGHT: 211.42 LBS | DIASTOLIC BLOOD PRESSURE: 72 MMHG | BODY MASS INDEX: 38.91 KG/M2

## 2019-11-04 DIAGNOSIS — G89.29 CHRONIC LEFT HIP PAIN: ICD-10-CM

## 2019-11-04 DIAGNOSIS — M25.552 CHRONIC LEFT HIP PAIN: ICD-10-CM

## 2019-11-04 PROCEDURE — 99214 OFFICE O/P EST MOD 30 MIN: CPT | Performed by: FAMILY MEDICINE

## 2019-11-04 PROCEDURE — 73502 X-RAY EXAM HIP UNI 2-3 VIEWS: CPT | Mod: LT

## 2019-11-04 NOTE — PROGRESS NOTES
"CC: Left hip pain    HPI:   Deloris presents today with left hip pain.  Patient stated that it has been a chronic pain for several years, however lately in the past month the pain has worsened significantly, it has been affecting her quality of life.  Patient stated that it takes her long time until she stands up from her bed in the morning and during the day she has to be careful while she was walking because she has been waddling, which has been causing a balance issues.    Patient Active Problem List    Diagnosis Date Noted   • Hypothyroid 01/13/2018     Priority: Medium   • Mixed hyperlipidemia 12/18/2018   • Essential hypertension 12/18/2018   • Osteopenia 12/18/2018       Current Outpatient Medications   Medication Sig Dispense Refill   • traZODone (DESYREL) 50 MG Tab TAKE 1 TABLET BY MOUTH EVERY NIGHT AT BEDTIME 90 Tab 3   • levothyroxine (SYNTHROID) 137 MCG Tab TAKE 1 TABLET BY MOUTH EVERY SATURDAY AND SUNDAY ONLY 90 Tab 1   • Amlodipine-Olmesartan (RYAN) 5-40 MG Tab Take 1 Tab by mouth every day. 90 Tab 3   • hydrochlorothiazide (MICROZIDE) 12.5 MG capsule Take 1 Cap by mouth every day. 90 Cap 3   • levothyroxine (SYNTHROID) 125 MCG Tab Take 1 Tab by mouth every day. 90 Tab 3   • lovastatin (MEVACOR) 20 MG Tab Take 1 Tab by mouth every day. 90 Tab 3   • tizanidine (ZANAFLEX) 4 MG Tab Take 4 mg by mouth every 8 hours.       No current facility-administered medications for this visit.          Allergies as of 11/04/2019   • (No Known Allergies)        ROS: Denies any chest pain, Shortness of breath, Changes bowel or bladder, Lower extremity edema.    Physical Exam:  /72 (BP Location: Right arm, Patient Position: Sitting, BP Cuff Size: Adult)   Pulse 85   Temp 36.3 °C (97.4 °F) (Temporal)   Resp 16   Ht 1.575 m (5' 2\")   Wt 95.9 kg (211 lb 6.7 oz)   SpO2 94%   BMI 38.67 kg/m²   Gen.: Well-developed, well-nourished, no apparent distress,pleasant and cooperative with the examination  Skin:  Warm and " dry with good turgor.   Cor: Regular rate and rhythm without murmur, gallop or rub.  Lungs: Respirations unlabored.Clear to auscultation with equal breath sounds bilaterally. No wheezes, rhonchi.  Left hip: Decreased range of motion, no tenderness or swelling.        Assessment and Plan.   63 y.o. female     1. Chronic left hip pain  Probably osteoarthritis.  Sent patient to get and x-ray of the left hip.  I will send patient an orthopedist.  Will order an MRI if needed based on the x-ray results.  Patient advised to take a nonsteroidal anti-inflammatory only for 3 to 5 days only.  Has normal kidney functions, and no history of GI bleeding.  Patient advised to use an assistive device e.g. cane to help with ambulation to avoid falling.    - REFERRAL TO ORTHOPEDICS  - DX-HIP-UNILATERAL-W/O PELVIS-2/3 VIEWS LEFT; Future

## 2019-11-06 ENCOUNTER — OFFICE VISIT (OUTPATIENT)
Dept: URGENT CARE | Facility: PHYSICIAN GROUP | Age: 64
End: 2019-11-06
Payer: MEDICARE

## 2019-11-06 VITALS
SYSTOLIC BLOOD PRESSURE: 110 MMHG | DIASTOLIC BLOOD PRESSURE: 76 MMHG | OXYGEN SATURATION: 95 % | WEIGHT: 212 LBS | BODY MASS INDEX: 40.02 KG/M2 | HEART RATE: 83 BPM | RESPIRATION RATE: 16 BRPM | TEMPERATURE: 98.7 F | HEIGHT: 61 IN

## 2019-11-06 DIAGNOSIS — M25.859 FEMORAL ACETABULAR IMPINGEMENT: ICD-10-CM

## 2019-11-06 PROCEDURE — 99202 OFFICE O/P NEW SF 15 MIN: CPT | Performed by: EMERGENCY MEDICINE

## 2019-11-06 ASSESSMENT — ENCOUNTER SYMPTOMS
WEAKNESS: 0
HIP PAIN: 1
FEVER: 0
BACK PAIN: 0
NUMBNESS: 0

## 2019-11-06 NOTE — RESULT ENCOUNTER NOTE
Tried calling patient a few times this morning. Patient is unable to hear on the phone and hung up each time I tried calling . Letter sent

## 2019-11-07 NOTE — PROGRESS NOTES
Subjective:      Deloris Brandt is a 63 y.o. female who presents with Hip Pain (x 2 months, constent pain, L hip )            Hip Pain   This is a chronic problem. The current episode started more than 1 year ago. The problem occurs daily. The problem has been rapidly worsening. Pertinent negatives include no fever, numbness or weakness. The symptoms are aggravated by walking. She has tried rest for the symptoms. The treatment provided no relief.   Patient saw PCP 2 days ago, had x-ray done.  Did not know report; was referred to orthopedics.  Advised to use ibuprofen sparingly.  Has used topical agents with mild relief in the past.    Review of Systems   Constitutional: Negative for fever.   Musculoskeletal: Negative for back pain.   Neurological: Negative for weakness and numbness.     PMH:  has a past medical history of Hyperlipidemia, Hypertension, and Hypothyroid.  MEDS:   Current Outpatient Medications:   •  traZODone (DESYREL) 50 MG Tab, TAKE 1 TABLET BY MOUTH EVERY NIGHT AT BEDTIME, Disp: 90 Tab, Rfl: 3  •  levothyroxine (SYNTHROID) 137 MCG Tab, TAKE 1 TABLET BY MOUTH EVERY SATURDAY AND SUNDAY ONLY, Disp: 90 Tab, Rfl: 1  •  Amlodipine-Olmesartan (RYAN) 5-40 MG Tab, Take 1 Tab by mouth every day., Disp: 90 Tab, Rfl: 3  •  hydrochlorothiazide (MICROZIDE) 12.5 MG capsule, Take 1 Cap by mouth every day., Disp: 90 Cap, Rfl: 3  •  levothyroxine (SYNTHROID) 125 MCG Tab, Take 1 Tab by mouth every day., Disp: 90 Tab, Rfl: 3  •  lovastatin (MEVACOR) 20 MG Tab, Take 1 Tab by mouth every day., Disp: 90 Tab, Rfl: 3  •  tizanidine (ZANAFLEX) 4 MG Tab, Take 4 mg by mouth every 8 hours., Disp: , Rfl:   ALLERGIES: No Known Allergies  SURGHX:   Past Surgical History:   Procedure Laterality Date   • OTHER ABDOMINAL SURGERY  01/01/2001    Gastric bypass   • APPENDECTOMY  48 years ago   • OTHER      tonsilectomy     SOCHX:  reports that she has never smoked. She has never used smokeless tobacco. She reports that she does not  "drink alcohol or use drugs.  FH: Family history is unknown by patient.     Objective:     /76   Pulse 83   Temp 37.1 °C (98.7 °F) (Temporal)   Resp 16   Ht 1.549 m (5' 1\")   Wt 96.2 kg (212 lb)   SpO2 95%   BMI 40.06 kg/m²      Physical Exam  Constitutional:       Appearance: She is well-developed and well-groomed.   Cardiovascular:      Pulses:           Posterior tibial pulses are 2+ on the left side.   Musculoskeletal:      Left hip: She exhibits tenderness. She exhibits normal range of motion, normal strength, no crepitus and no deformity.   Skin:     General: Skin is warm and dry.   Neurological:      Mental Status: She is alert.      Sensory: Sensation is intact.      Motor: Motor function is intact.   Psychiatric:         Behavior: Behavior is cooperative.            Advised patient of x-ray report.     Assessment/Plan:       1. Femoral acetabular impingement  Ice/heat, topical analgesics, Tylenol.  Consider use of cane for assistance.  Continue plan for orthopedic follow-up as soon as available.  "

## 2019-12-05 DIAGNOSIS — E03.9 ACQUIRED HYPOTHYROIDISM: ICD-10-CM

## 2019-12-05 DIAGNOSIS — I10 ESSENTIAL HYPERTENSION: ICD-10-CM

## 2019-12-05 RX ORDER — HYDROCHLOROTHIAZIDE 12.5 MG/1
12.5 CAPSULE, GELATIN COATED ORAL DAILY
Qty: 90 CAP | Refills: 3 | Status: SHIPPED | OUTPATIENT
Start: 2019-12-05 | End: 2020-11-20

## 2019-12-05 RX ORDER — LEVOTHYROXINE SODIUM 0.12 MG/1
125 TABLET ORAL
Qty: 90 TAB | Refills: 3 | Status: SHIPPED | OUTPATIENT
Start: 2019-12-05 | End: 2020-11-20

## 2020-01-18 DIAGNOSIS — I10 ESSENTIAL HYPERTENSION: ICD-10-CM

## 2020-01-18 DIAGNOSIS — E78.2 MIXED HYPERLIPIDEMIA: ICD-10-CM

## 2020-01-20 RX ORDER — LOVASTATIN 20 MG/1
TABLET ORAL
Qty: 90 TAB | Refills: 3 | Status: SHIPPED | OUTPATIENT
Start: 2020-01-20 | End: 2021-01-05

## 2020-01-20 RX ORDER — AMLODIPINE AND OLMESARTAN MEDOXOMIL 5; 40 MG/1; MG/1
TABLET ORAL
Qty: 90 TAB | Refills: 3 | Status: SHIPPED | OUTPATIENT
Start: 2020-01-20 | End: 2021-01-11

## 2020-02-05 ENCOUNTER — OFFICE VISIT (OUTPATIENT)
Dept: MEDICAL GROUP | Facility: MEDICAL CENTER | Age: 65
End: 2020-02-05
Payer: MEDICARE

## 2020-02-05 VITALS
HEART RATE: 85 BPM | RESPIRATION RATE: 14 BRPM | WEIGHT: 209 LBS | SYSTOLIC BLOOD PRESSURE: 110 MMHG | BODY MASS INDEX: 38.46 KG/M2 | DIASTOLIC BLOOD PRESSURE: 74 MMHG | OXYGEN SATURATION: 91 % | HEIGHT: 62 IN | TEMPERATURE: 97.9 F

## 2020-02-05 DIAGNOSIS — E03.4 HYPOTHYROIDISM DUE TO ACQUIRED ATROPHY OF THYROID: ICD-10-CM

## 2020-02-05 DIAGNOSIS — H57.89 RED EYE: ICD-10-CM

## 2020-02-05 DIAGNOSIS — H92.09 EAR ACHE: ICD-10-CM

## 2020-02-05 DIAGNOSIS — I10 ESSENTIAL HYPERTENSION: ICD-10-CM

## 2020-02-05 PROCEDURE — 99214 OFFICE O/P EST MOD 30 MIN: CPT | Performed by: FAMILY MEDICINE

## 2020-02-05 RX ORDER — ERYTHROMYCIN 5 MG/G
1 OINTMENT OPHTHALMIC 2 TIMES DAILY
Qty: 1 TUBE | Refills: 0 | Status: SHIPPED | OUTPATIENT
Start: 2020-02-05 | End: 2022-06-24

## 2020-02-05 ASSESSMENT — PATIENT HEALTH QUESTIONNAIRE - PHQ9: CLINICAL INTERPRETATION OF PHQ2 SCORE: 0

## 2020-02-05 NOTE — PROGRESS NOTES
CC: Right ear pain,  left eye pain, hypertension, hypothyroidism    HPI:   Deloris presents today to discuss the following medical issues:    Right ear ache  Patient reported pain of the right ear that started yesterday.  Patient has had flulike symptoms 2 days ago that was resolved only 2 days ago.  Has been associated with runny nose and some pressure like symptoms in the ears especially the right ear.  After the condition resolved patient continues to have a pressure-like symptoms which change to an ache since yesterday.  She also reported some hearing difficulties in that ear.      Left Red eye  Patient has been having left red eye for 2 days, denies any itching, she has been feeling some mild pain on the left eye, and she has been having a lot of tears on the thigh.  Has a normal eyesight.      Essential hypertension  Has been adequately controlled on current medication. Denies headache, chest pain, and SOB.patient has been on amlodipine-olmesartan 5-40 mg daily, hydrochlorothiazide 12.5 mg daily.  No side effects    Hypothyroidism due to acquired atrophy of thyroid  She has been tolerating Levothyroxine, no palpitation, no cold or heat intolerance.  Has been on levothyroxine 125 mcg Monday to Friday, and levothyroxine 137 mcg Saturday , and Sunday.  Last TSH was checked in 7/2019 was normal.    Patient Active Problem List    Diagnosis Date Noted   • Hypothyroid 01/13/2018     Priority: Medium   • Mixed hyperlipidemia 12/18/2018   • Essential hypertension 12/18/2018   • Osteopenia 12/18/2018       Current Outpatient Medications   Medication Sig Dispense Refill   • neomycin sulf/polymyx B sulf/HC soln (CORTISPORIN HC SOL) 3.5-40733-8 Solution Place 3 Drops in right ear 3 times a day. Administer drops to both ears. 1 Bottle 0   • erythromycin 5 MG/GM Ointment Place 1 Application in both eyes 2 times a day. 1 Tube 0   • lovastatin (MEVACOR) 20 MG Tab TAKE 1 TABLET BY MOUTH EVERY DAY 90 Tab 3   •  "amLODIPine-Olmesartan 5-40 MG Tab TAKE 1 TABLET BY MOUTH EVERY DAY 90 Tab 3   • hydrochlorothiazide (MICROZIDE) 12.5 MG capsule Take 1 Cap by mouth every day. 90 Cap 3   • levothyroxine (SYNTHROID) 125 MCG Tab Take 1 Tab by mouth Every morning on an empty stomach. 90 Tab 3   • traZODone (DESYREL) 50 MG Tab TAKE 1 TABLET BY MOUTH EVERY NIGHT AT BEDTIME 90 Tab 3   • levothyroxine (SYNTHROID) 137 MCG Tab TAKE 1 TABLET BY MOUTH EVERY SATURDAY AND SUNDAY ONLY 90 Tab 1   • tizanidine (ZANAFLEX) 4 MG Tab Take 4 mg by mouth every 8 hours.       No current facility-administered medications for this visit.          Allergies as of 02/05/2020   • (No Known Allergies)        ROS: Denies any chest pain, Shortness of breath, Changes bowel or bladder, Lower extremity edema.    Physical Exam:  /74 (BP Location: Left arm, Patient Position: Sitting, BP Cuff Size: Large adult)   Pulse 85   Temp 36.6 °C (97.9 °F) (Temporal)   Resp 14   Ht 1.575 m (5' 2\")   Wt 94.8 kg (208 lb 15.9 oz)   SpO2 91%   BMI 38.23 kg/m²   Gen.: Well-developed, well-nourished, no apparent distress,pleasant and cooperative with the examination  Skin:  Warm and dry with good turgor. No rashes or suspicious lesions in visible areas  Right ear: Redness of the external ear, normal tympanic membrane.  Eyes: Redness and watery discharge of the left eye.  Neck: Trachea midline,no masses or adenopathy. No JVD.  Cor: Regular rate and rhythm without murmur, gallop or rub.  Lungs: Respirations unlabored.Clear to auscultation with equal breath sounds bilaterally. No wheezes, rhonchi.  Extremities: No cyanosis, clubbing or edema.      Assessment and Plan.   64 y.o. female     1. Ear ache  Probably otitis externa of the right ear.    - neomycin sulf/polymyx B sulf/HC soln (CORTISPORIN HC SOL) 3.5-45108-9 Solution; Place 3 Drops in right ear 3 times a day. Administer drops to both ears.  Dispense: 1 Bottle; Refill: 0    2. Red eye  Most probably viral " conjunctivitis, however we will give the patient erythromycin ointment for prophylaxis against bacterial conjunctivitis.    - erythromycin 5 MG/GM Ointment; Place 1 Application in both eyes 2 times a day.  Dispense: 1 Tube; Refill: 0    3. Essential hypertension  Controlled.  Continue on amlodipine-olmesartan 5-40 mg daily, hydrochlorothiazide 12.5 mg daily.    - CBC WITH DIFFERENTIAL; Future  - Comp Metabolic Panel; Future  - Lipid Profile; Future    4. Hypothyroidism due to acquired atrophy of thyroid  He has been tolerating Levothyroxine, no palpitation, no cold or heat intolerance  Continue on levothyroxine 125 mcg Monday to Friday, and levothyroxine 137 mcg Saturday , and Sunday.    - TSH; Future  - FREE THYROXINE; Future

## 2020-02-07 LAB
ALBUMIN SERPL-MCNC: 3.8 G/DL (ref 3.8–4.8)
ALBUMIN/GLOB SERPL: 1.3 {RATIO} (ref 1.2–2.2)
ALP SERPL-CCNC: 104 IU/L (ref 39–117)
ALT SERPL-CCNC: 17 IU/L (ref 0–32)
AST SERPL-CCNC: 17 IU/L (ref 0–40)
BASOPHILS # BLD AUTO: 0.1 X10E3/UL (ref 0–0.2)
BASOPHILS NFR BLD AUTO: 1 %
BILIRUB SERPL-MCNC: 0.2 MG/DL (ref 0–1.2)
BUN SERPL-MCNC: 11 MG/DL (ref 8–27)
BUN/CREAT SERPL: 12 (ref 12–28)
CALCIUM SERPL-MCNC: 9 MG/DL (ref 8.7–10.3)
CHLORIDE SERPL-SCNC: 107 MMOL/L (ref 96–106)
CHOLEST SERPL-MCNC: 153 MG/DL (ref 100–199)
CO2 SERPL-SCNC: 23 MMOL/L (ref 20–29)
CREAT SERPL-MCNC: 0.9 MG/DL (ref 0.57–1)
EOSINOPHIL # BLD AUTO: 0.4 X10E3/UL (ref 0–0.4)
EOSINOPHIL NFR BLD AUTO: 4 %
ERYTHROCYTE [DISTWIDTH] IN BLOOD BY AUTOMATED COUNT: 14.3 % (ref 11.7–15.4)
GLOBULIN SER CALC-MCNC: 2.9 G/DL (ref 1.5–4.5)
GLUCOSE SERPL-MCNC: 88 MG/DL (ref 65–99)
HCT VFR BLD AUTO: 42.6 % (ref 34–46.6)
HDLC SERPL-MCNC: 36 MG/DL
HGB BLD-MCNC: 14 G/DL (ref 11.1–15.9)
IMM GRANULOCYTES # BLD AUTO: 0 X10E3/UL (ref 0–0.1)
IMM GRANULOCYTES NFR BLD AUTO: 0 %
IMMATURE CELLS  115398: ABNORMAL
LABORATORY COMMENT REPORT: ABNORMAL
LDLC SERPL CALC-MCNC: 89 MG/DL (ref 0–99)
LYMPHOCYTES # BLD AUTO: 3 X10E3/UL (ref 0.7–3.1)
LYMPHOCYTES NFR BLD AUTO: 27 %
MCH RBC QN AUTO: 27.1 PG (ref 26.6–33)
MCHC RBC AUTO-ENTMCNC: 32.9 G/DL (ref 31.5–35.7)
MCV RBC AUTO: 82 FL (ref 79–97)
MONOCYTES # BLD AUTO: 0.8 X10E3/UL (ref 0.1–0.9)
MONOCYTES NFR BLD AUTO: 7 %
MORPHOLOGY BLD-IMP: ABNORMAL
NEUTROPHILS # BLD AUTO: 6.9 X10E3/UL (ref 1.4–7)
NEUTROPHILS NFR BLD AUTO: 61 %
NRBC BLD AUTO-RTO: ABNORMAL %
PLATELET # BLD AUTO: 394 X10E3/UL (ref 150–450)
POTASSIUM SERPL-SCNC: 3.3 MMOL/L (ref 3.5–5.2)
PROT SERPL-MCNC: 6.7 G/DL (ref 6–8.5)
RBC # BLD AUTO: 5.17 X10E6/UL (ref 3.77–5.28)
SODIUM SERPL-SCNC: 144 MMOL/L (ref 134–144)
T4 FREE SERPL-MCNC: 0.79 NG/DL (ref 0.82–1.77)
TRIGL SERPL-MCNC: 141 MG/DL (ref 0–149)
TSH SERPL DL<=0.005 MIU/L-ACNC: 6.68 UIU/ML (ref 0.45–4.5)
VLDLC SERPL CALC-MCNC: 28 MG/DL (ref 5–40)
WBC # BLD AUTO: 11.2 X10E3/UL (ref 3.4–10.8)

## 2020-02-11 ENCOUNTER — OFFICE VISIT (OUTPATIENT)
Dept: MEDICAL GROUP | Facility: MEDICAL CENTER | Age: 65
End: 2020-02-11
Payer: MEDICARE

## 2020-02-11 VITALS
TEMPERATURE: 98.2 F | WEIGHT: 209.88 LBS | HEART RATE: 91 BPM | HEIGHT: 62 IN | BODY MASS INDEX: 38.62 KG/M2 | DIASTOLIC BLOOD PRESSURE: 74 MMHG | OXYGEN SATURATION: 94 % | SYSTOLIC BLOOD PRESSURE: 126 MMHG | RESPIRATION RATE: 16 BRPM

## 2020-02-11 DIAGNOSIS — R09.81 SINUS CONGESTION: ICD-10-CM

## 2020-02-11 PROCEDURE — 99214 OFFICE O/P EST MOD 30 MIN: CPT | Performed by: FAMILY MEDICINE

## 2020-02-11 RX ORDER — AMOXICILLIN AND CLAVULANATE POTASSIUM 875; 125 MG/1; MG/1
1 TABLET, FILM COATED ORAL 2 TIMES DAILY
Qty: 14 TAB | Refills: 0 | Status: SHIPPED | OUTPATIENT
Start: 2020-02-11 | End: 2020-02-18

## 2020-02-11 NOTE — PROGRESS NOTES
CC: Pressure symptoms and pain of the maxillary sinuses, bilateral ear ringing    HPI:   Deloris presents today because she has been having increased pressure symptoms and pain of the maxillary sinuses associated with mild rhinorrhea however with thick secretions.  Patient reported ringing ear bilaterally, however denies any earache, ear discharge, dizziness, or vertigo.  He was treated recently for otitis externa with topical eardrops.      Patient Active Problem List    Diagnosis Date Noted   • Hypothyroid 01/13/2018     Priority: Medium   • Mixed hyperlipidemia 12/18/2018   • Essential hypertension 12/18/2018   • Osteopenia 12/18/2018       Current Outpatient Medications   Medication Sig Dispense Refill   • neomycin sulf/polymyx B sulf/HC soln (CORTISPORIN HC SOL) 3.5-47255-6 Solution Place 3 Drops in right ear 3 times a day. Administer drops to both ears. 1 Bottle 0   • erythromycin 5 MG/GM Ointment Place 1 Application in both eyes 2 times a day. 1 Tube 0   • lovastatin (MEVACOR) 20 MG Tab TAKE 1 TABLET BY MOUTH EVERY DAY 90 Tab 3   • amLODIPine-Olmesartan 5-40 MG Tab TAKE 1 TABLET BY MOUTH EVERY DAY 90 Tab 3   • hydrochlorothiazide (MICROZIDE) 12.5 MG capsule Take 1 Cap by mouth every day. 90 Cap 3   • levothyroxine (SYNTHROID) 125 MCG Tab Take 1 Tab by mouth Every morning on an empty stomach. 90 Tab 3   • traZODone (DESYREL) 50 MG Tab TAKE 1 TABLET BY MOUTH EVERY NIGHT AT BEDTIME 90 Tab 3   • levothyroxine (SYNTHROID) 137 MCG Tab TAKE 1 TABLET BY MOUTH EVERY SATURDAY AND SUNDAY ONLY 90 Tab 1   • tizanidine (ZANAFLEX) 4 MG Tab Take 4 mg by mouth every 8 hours.       No current facility-administered medications for this visit.          Allergies as of 02/11/2020   • (No Known Allergies)        ROS: Denies any chest pain, Shortness of breath, Changes bowel or bladder, Lower extremity edema.    Physical Exam:  /74 (BP Location: Right arm, Patient Position: Sitting, BP Cuff Size: Adult)   Pulse 91   Temp  "36.8 °C (98.2 °F) (Temporal)   Resp 16   Ht 1.575 m (5' 2\")   Wt 95.2 kg (209 lb 14.1 oz)   SpO2 94%   BMI 38.39 kg/m²   Gen.: Well-developed, well-nourished, no apparent distress,pleasant and cooperative with the examination  Skin:  Warm and dry with good turgor. No rashes or suspicious lesions in visible areas  HEENT: Maxillary sinuses are tender with palpation, TMs clear, nares patent with pink mucosa and clear rhinorrhea,no septal deviation ,polyps or lesions. lips without lesions, oropharynx clear.  Neck: Trachea midline,no masses or adenopathy. No JVD.  Cor: Regular rate and rhythm without murmur, gallop or rub.  Lungs: Respirations unlabored.Clear to auscultation with equal breath sounds bilaterally. No wheezes, rhonchi.  Extremities: No cyanosis, clubbing or edema.  .        Assessment and Plan.   64 y.o. female     1. Sinus congestion  Probably maxillary sinusitis.  Start patient on oral antibiotics for a week.  I also recommend supportive treatment as follows:  Drink plenty of fluids to keep yourself hydrated. Warm fluids like tea and hot soup are better.  Increase humidity in the house with a humidifier or place your head over a steaming pot.-  Use Tylenol or Motrin for aches, pains, or fever.  Get plenty of rest to allow your body time to heal.  Try OTC normal saline nasal spray.  Contact the office or return for appt. if you develop worsening symptoms or do not improve in the next week.  Avoid airway irritants such as tobacco smoke or blowing dust.    - amoxicillin-clavulanate (AUGMENTIN) 875-125 MG Tab; Take 1 Tab by mouth 2 times a day for 7 days.  Dispense: 14 Tab; Refill: 0    2.  Bilateral ringing ear  Probably related to the sinusitis.    We will continue monitor and see after the treatment of the sinusitis.  Patient advised to call for an ENT referral if the condition persist after the completion of antibiotics for the sinusitis.        "

## 2020-07-06 DIAGNOSIS — E03.9 ACQUIRED HYPOTHYROIDISM: ICD-10-CM

## 2020-07-07 RX ORDER — LEVOTHYROXINE SODIUM 137 UG/1
TABLET ORAL
Qty: 90 TAB | Refills: 1 | Status: SHIPPED | OUTPATIENT
Start: 2020-07-07 | End: 2020-10-05

## 2020-08-15 DIAGNOSIS — F51.04 CHRONIC INSOMNIA: ICD-10-CM

## 2020-08-17 RX ORDER — TRAZODONE HYDROCHLORIDE 50 MG/1
TABLET ORAL
Qty: 90 TAB | Refills: 3 | Status: SHIPPED | OUTPATIENT
Start: 2020-08-17 | End: 2021-08-10

## 2020-10-03 DIAGNOSIS — E03.9 ACQUIRED HYPOTHYROIDISM: ICD-10-CM

## 2020-10-05 RX ORDER — LEVOTHYROXINE SODIUM 137 UG/1
TABLET ORAL
Qty: 90 TAB | Refills: 1 | Status: SHIPPED | OUTPATIENT
Start: 2020-10-05 | End: 2021-03-29

## 2020-11-18 ENCOUNTER — PATIENT OUTREACH (OUTPATIENT)
Dept: HEALTH INFORMATION MANAGEMENT | Facility: OTHER | Age: 65
End: 2020-11-18

## 2020-11-18 NOTE — PROGRESS NOTES
1. [facility] Verified: yes    2. Verify PCP: yes    3. Review and add  to Care Team: yes    4.  Reviewed/Updated the following with patient:       •   Communication Preference Obtained? YES  • MyChart Activation: already active       •   E-Mail Address Obtained? YES       •   Appointment Day and Time Preferences? YES       •   Preferred Pharmacy? YES       •   Preferred Lab? YES    6. Care Gap Scheduling (Attempt to Schedule EACH Overdue Care Gap!)

## 2020-11-20 DIAGNOSIS — E03.9 ACQUIRED HYPOTHYROIDISM: ICD-10-CM

## 2020-11-20 DIAGNOSIS — I10 ESSENTIAL HYPERTENSION: ICD-10-CM

## 2020-11-20 RX ORDER — HYDROCHLOROTHIAZIDE 12.5 MG/1
CAPSULE, GELATIN COATED ORAL
Qty: 90 CAP | Refills: 3 | Status: SHIPPED | OUTPATIENT
Start: 2020-11-20 | End: 2021-11-11

## 2020-11-20 RX ORDER — LEVOTHYROXINE SODIUM 0.12 MG/1
TABLET ORAL
Qty: 90 TAB | Refills: 3 | Status: SHIPPED | OUTPATIENT
Start: 2020-11-20 | End: 2021-11-09

## 2021-01-05 DIAGNOSIS — E78.2 MIXED HYPERLIPIDEMIA: ICD-10-CM

## 2021-01-05 RX ORDER — LOVASTATIN 20 MG/1
20 TABLET ORAL DAILY
Qty: 90 TAB | Refills: 3 | Status: SHIPPED | OUTPATIENT
Start: 2021-01-05 | End: 2021-04-05 | Stop reason: SDUPTHER

## 2021-01-09 DIAGNOSIS — I10 ESSENTIAL HYPERTENSION: ICD-10-CM

## 2021-01-11 RX ORDER — AMLODIPINE AND OLMESARTAN MEDOXOMIL 5; 40 MG/1; MG/1
TABLET ORAL
Qty: 90 TAB | Refills: 3 | Status: SHIPPED | OUTPATIENT
Start: 2021-01-11 | End: 2021-07-02 | Stop reason: SDUPTHER

## 2021-03-03 DIAGNOSIS — Z23 NEED FOR VACCINATION: ICD-10-CM

## 2021-03-10 ENCOUNTER — PATIENT OUTREACH (OUTPATIENT)
Dept: HEALTH INFORMATION MANAGEMENT | Facility: OTHER | Age: 66
End: 2021-03-10

## 2021-03-12 ENCOUNTER — IMMUNIZATION (OUTPATIENT)
Dept: FAMILY PLANNING/WOMEN'S HEALTH CLINIC | Facility: IMMUNIZATION CENTER | Age: 66
End: 2021-03-12
Attending: INTERNAL MEDICINE
Payer: MEDICARE

## 2021-03-12 DIAGNOSIS — Z23 NEED FOR VACCINATION: ICD-10-CM

## 2021-03-12 DIAGNOSIS — Z23 ENCOUNTER FOR VACCINATION: Primary | ICD-10-CM

## 2021-03-12 PROCEDURE — 0011A MODERNA SARS-COV-2 VACCINE: CPT | Performed by: INTERNAL MEDICINE

## 2021-03-12 PROCEDURE — 91301 MODERNA SARS-COV-2 VACCINE: CPT | Performed by: INTERNAL MEDICINE

## 2021-03-29 DIAGNOSIS — E03.9 ACQUIRED HYPOTHYROIDISM: ICD-10-CM

## 2021-03-29 RX ORDER — LEVOTHYROXINE SODIUM 137 UG/1
137 TABLET ORAL
Qty: 90 TABLET | Refills: 3 | Status: SHIPPED | OUTPATIENT
Start: 2021-04-03 | End: 2022-03-17

## 2021-03-29 NOTE — TELEPHONE ENCOUNTER
Was the patient seen in the last year in this department?No    Does patient have an active prescription for medications requested? No     Received Request Via: Pharmacy

## 2021-04-05 DIAGNOSIS — E78.2 MIXED HYPERLIPIDEMIA: ICD-10-CM

## 2021-04-05 RX ORDER — LOVASTATIN 20 MG/1
20 TABLET ORAL DAILY
Qty: 100 TABLET | Refills: 0 | Status: SHIPPED | OUTPATIENT
Start: 2021-04-05 | End: 2022-04-21 | Stop reason: SDUPTHER

## 2021-04-05 NOTE — TELEPHONE ENCOUNTER
[facility] is requesting new Rx for Lovastatin 20mg Tab as a 100-Day Supply to be sent to the pharmacy. Please advise if appropriate.

## 2021-04-10 ENCOUNTER — IMMUNIZATION (OUTPATIENT)
Dept: FAMILY PLANNING/WOMEN'S HEALTH CLINIC | Facility: IMMUNIZATION CENTER | Age: 66
End: 2021-04-10
Attending: INTERNAL MEDICINE
Payer: MEDICARE

## 2021-04-10 DIAGNOSIS — Z23 ENCOUNTER FOR VACCINATION: Primary | ICD-10-CM

## 2021-04-10 PROCEDURE — 0012A MODERNA SARS-COV-2 VACCINE: CPT

## 2021-04-10 PROCEDURE — 91301 MODERNA SARS-COV-2 VACCINE: CPT

## 2021-05-14 ENCOUNTER — PATIENT OUTREACH (OUTPATIENT)
Dept: HEALTH INFORMATION MANAGEMENT | Facility: OTHER | Age: 66
End: 2021-05-14

## 2021-05-14 NOTE — PROGRESS NOTES
Outcome: Unable leave voice mail  for Comprehensive health Assessment       Please transfer to Patient Outreach Team at [phone] when patient returns call.      Attempt # 1

## 2021-07-02 DIAGNOSIS — I10 ESSENTIAL HYPERTENSION: ICD-10-CM

## 2021-07-02 RX ORDER — AMLODIPINE AND OLMESARTAN MEDOXOMIL 5; 40 MG/1; MG/1
1 TABLET ORAL
Qty: 90 TABLET | Refills: 0 | Status: SHIPPED | OUTPATIENT
Start: 2021-07-02 | End: 2021-07-13 | Stop reason: SDUPTHER

## 2021-07-13 DIAGNOSIS — I10 ESSENTIAL HYPERTENSION: ICD-10-CM

## 2021-07-13 RX ORDER — AMLODIPINE AND OLMESARTAN MEDOXOMIL 5; 40 MG/1; MG/1
1 TABLET ORAL
Qty: 100 TABLET | Refills: 0 | Status: SHIPPED | OUTPATIENT
Start: 2021-07-13 | End: 2021-11-16 | Stop reason: SDUPTHER

## 2021-08-10 DIAGNOSIS — F51.04 CHRONIC INSOMNIA: ICD-10-CM

## 2021-08-10 RX ORDER — TRAZODONE HYDROCHLORIDE 50 MG/1
TABLET ORAL
Qty: 90 TABLET | Refills: 3 | Status: SHIPPED | OUTPATIENT
Start: 2021-08-10 | End: 2021-12-02

## 2021-11-09 DIAGNOSIS — E03.9 ACQUIRED HYPOTHYROIDISM: ICD-10-CM

## 2021-11-09 RX ORDER — LEVOTHYROXINE SODIUM 0.12 MG/1
TABLET ORAL
Qty: 90 TABLET | Refills: 3 | Status: SHIPPED | OUTPATIENT
Start: 2021-11-09 | End: 2022-11-01

## 2021-11-16 DIAGNOSIS — I10 ESSENTIAL HYPERTENSION: ICD-10-CM

## 2021-11-16 RX ORDER — AMLODIPINE AND OLMESARTAN MEDOXOMIL 5; 40 MG/1; MG/1
1 TABLET ORAL
Qty: 100 TABLET | Refills: 0 | Status: SHIPPED | OUTPATIENT
Start: 2021-11-16 | End: 2023-08-09 | Stop reason: SDUPTHER

## 2021-11-26 ENCOUNTER — TELEPHONE (OUTPATIENT)
Dept: HEALTH INFORMATION MANAGEMENT | Facility: OTHER | Age: 66
End: 2021-11-26

## 2021-12-02 PROBLEM — D72.829 LEUKOCYTOSIS: Status: ACTIVE | Noted: 2021-12-02

## 2021-12-02 PROBLEM — H26.9 CATARACTS, BILATERAL: Status: ACTIVE | Noted: 2021-12-02

## 2021-12-02 PROBLEM — E87.6 HYPOKALEMIA: Status: ACTIVE | Noted: 2021-12-02

## 2021-12-02 PROBLEM — E66.01 MORBID OBESITY WITH BMI OF 40.0-44.9, ADULT (HCC): Status: ACTIVE | Noted: 2021-12-02

## 2021-12-02 PROBLEM — R73.9 HYPERGLYCEMIA: Status: ACTIVE | Noted: 2021-12-02

## 2022-01-05 ENCOUNTER — APPOINTMENT (OUTPATIENT)
Dept: PHARMACY | Facility: MEDICAL CENTER | Age: 67
End: 2022-01-05
Payer: MEDICARE

## 2022-01-05 PROCEDURE — RXMED WILLOW AMBULATORY MEDICATION CHARGE: Performed by: INTERNAL MEDICINE

## 2022-01-06 ENCOUNTER — PHARMACY VISIT (OUTPATIENT)
Dept: PHARMACY | Facility: MEDICAL CENTER | Age: 67
End: 2022-01-06
Payer: COMMERCIAL

## 2022-01-20 ENCOUNTER — PATIENT OUTREACH (OUTPATIENT)
Dept: HEALTH INFORMATION MANAGEMENT | Facility: OTHER | Age: 67
End: 2022-01-20

## 2022-02-04 ENCOUNTER — OFFICE VISIT (OUTPATIENT)
Dept: MEDICAL GROUP | Facility: MEDICAL CENTER | Age: 67
End: 2022-02-04
Payer: MEDICARE

## 2022-02-04 VITALS
WEIGHT: 219 LBS | DIASTOLIC BLOOD PRESSURE: 72 MMHG | OXYGEN SATURATION: 94 % | SYSTOLIC BLOOD PRESSURE: 124 MMHG | BODY MASS INDEX: 40.3 KG/M2 | HEIGHT: 62 IN | TEMPERATURE: 98.2 F | HEART RATE: 100 BPM | RESPIRATION RATE: 16 BRPM

## 2022-02-04 DIAGNOSIS — Z12.11 COLON CANCER SCREENING: ICD-10-CM

## 2022-02-04 DIAGNOSIS — Z12.31 SCREENING MAMMOGRAM FOR BREAST CANCER: ICD-10-CM

## 2022-02-04 DIAGNOSIS — I10 ESSENTIAL HYPERTENSION: ICD-10-CM

## 2022-02-04 DIAGNOSIS — Z00.00 MEDICARE ANNUAL WELLNESS VISIT, SUBSEQUENT: ICD-10-CM

## 2022-02-04 DIAGNOSIS — E03.9 ACQUIRED HYPOTHYROIDISM: ICD-10-CM

## 2022-02-04 DIAGNOSIS — F51.04 CHRONIC INSOMNIA: ICD-10-CM

## 2022-02-04 DIAGNOSIS — E78.2 MIXED HYPERLIPIDEMIA: ICD-10-CM

## 2022-02-04 PROCEDURE — G0439 PPPS, SUBSEQ VISIT: HCPCS | Performed by: FAMILY MEDICINE

## 2022-02-04 ASSESSMENT — ACTIVITIES OF DAILY LIVING (ADL): BATHING_REQUIRES_ASSISTANCE: 0

## 2022-02-04 ASSESSMENT — PATIENT HEALTH QUESTIONNAIRE - PHQ9: CLINICAL INTERPRETATION OF PHQ2 SCORE: 0

## 2022-02-04 ASSESSMENT — FIBROSIS 4 INDEX: FIB4 SCORE: 0.71

## 2022-02-04 ASSESSMENT — ENCOUNTER SYMPTOMS: GENERAL WELL-BEING: GOOD

## 2022-02-04 NOTE — PROGRESS NOTES
Chief Complaint   Patient presents with   • Annual Exam       HPI:  Deloris Brandt is a 66 y.o. here for Medicare Annual Wellness Visit     Patient Active Problem List    Diagnosis Date Noted   • Hyperglycemia 12/02/2021   • Hypokalemia 12/02/2021   • Leukocytosis 12/02/2021   • BMI 40.0-44.9, adult ([facility]) 12/02/2021   • Morbid obesity with BMI of 40.0-44.9, adult ([facility]) 12/02/2021   • Cataracts, bilateral 12/02/2021   • Mixed hyperlipidemia 12/18/2018   • Essential hypertension 12/18/2018   • Osteopenia 12/18/2018   • Hypothyroid 01/13/2018       Current Outpatient Medications   Medication Sig Dispense Refill   • COVID-19 mRNA vaccine, Moderna, (MODERNA COVID-19 VACCINE) 100 MCG/0.5ML Suspension injection Inject  into the shoulder, thigh, or buttocks. 0.25 mL 0   • traZODone (DESYREL) 50 MG Tab Take 25 mg by mouth every evening.     • amLODIPine-Olmesartan 5-40 MG Tab Take 1 Tablet by mouth every day. 100 Tablet 0   • hydrochlorothiazide (MICROZIDE) 12.5 MG capsule TAKE 1 CAPSULE BY MOUTH EVERY DAY 90 Capsule 2   • levothyroxine (SYNTHROID) 125 MCG Tab TAKE 1 TABLET BY MOUTH EVERY MORNING ON AN EMPTY STOMACH 90 Tablet 3   • lovastatin (MEVACOR) 20 MG Tab Take 1 tablet by mouth every day. 100 tablet 0   • levothyroxine (SYNTHROID) 137 MCG Tab Take 1 tablet by mouth in the morning every Sat and Sun. 90 tablet 3   • neomycin sulf/polymyx B sulf/HC soln (CORTISPORIN HC SOL) 3.5-22280-2 Solution Place 3 Drops in right ear 3 times a day. Administer drops to both ears. 1 Bottle 0   • erythromycin 5 MG/GM Ointment Place 1 Application in both eyes 2 times a day. 1 Tube 0   • tizanidine (ZANAFLEX) 4 MG Tab Take 4 mg by mouth every 8 hours.       No current facility-administered medications for this visit.          Current supplements as per medication list.     Allergies: Patient has no known allergies.    Current social contact/activities:     She  reports that she has never smoked. She has never used smokeless tobacco. She  reports that she does not drink alcohol and does not use drugs.  Counseling given: Yes      DPA/Advanced Directive:      ROS:    Gait: Uses no assistive device  Ostomy: No  Other tubes: No  Amputations: No  Chronic oxygen use: No  Last eye exam: Not sure  Wears hearing aids: No   : no leak    Screening:    Depression Screening    Little interest or pleasure in doing things?  0 - not at all  Feeling down, depressed , or hopeless? 0 - not at all  Patient Health Questionnaire Score: 0     If depressive symptoms identified deferred to follow up visit unless specifically addressed in assessment and plan.    Interpretation of PHQ-9 Total Score   Score Severity   1-4 No Depression   5-9 Mild Depression   10-14 Moderate Depression   15-19 Moderately Severe Depression   20-27 Severe Depression    Screening for Cognitive Impairment    Three Minute Recall (captain, garden, picture) 3/3    Robi clock face with all 12 numbers and set the hands to show 5 past 8.  Yes    Cognitive concerns identified deferred for follow up unless specifically addressed in assessment and plan.    Fall Risk Assessment    Has the patient had two or more falls in the last year or any fall with injury in the last year?  No    Safety Assessment    Throw rugs on floor.  Yes  Handrails on all stairs.  Yes  Good lighting in all hallways.  Yes  Difficulty hearing.  Yes  Patient counseled about all safety risks that were identified.    Functional Assessment ADLs    Are there any barriers preventing you from cooking for yourself or meeting nutritional needs?  No.    Are there any barriers preventing you from driving safely or obtaining transportation?  No.    Are there any barriers preventing you from using a telephone or calling for help?  No.    Are there any barriers preventing you from shopping?  No.    Are there any barriers preventing you from taking care of your own finances?  No.    Are there any barriers preventing you from managing your  medications?  No.    Are there any barriers preventing you from showering, bathing or dressing yourself?  No.    Are you currently engaging in any exercise or physical activity?  Yes.     What is your perception of your health?  Good.      Health Maintenance Summary          Overdue - HEPATITIS C SCREENING (Once) Overdue - never done    No completion history exists for this topic.          Overdue - IMM DTaP/Tdap/Td Vaccine (1 - Tdap) Overdue - never done    No completion history exists for this topic.          Overdue - PAP SMEAR (Every 3 Years) Overdue - never done    No completion history exists for this topic.          Overdue - COLORECTAL CANCER SCREENING (COLONOSCOPY - Every 10 Years) Overdue - never done    No completion history exists for this topic.          Overdue - IMM ZOSTER VACCINES (2 of 3) Overdue since 1/24/2014 11/29/2013  Outside Immunization: zoster live          Overdue - IMM PNEUMOCOCCAL VACCINE: 65+ Years (2 of 2 - PPSV23) Overdue since 11/10/2018    12/09/2015  Outside Immunization: PCV-13 (Prevnar 13)    01/14/2013  Imm Admin: Pneumococcal Conjugate Vaccine (Prevnar/PCV-13)    04/11/2011  Imm Admin: Pneumococcal polysaccharide vaccine (PPSV-23)    01/01/2011  Outside Immunization: pneumococcal polysaccharide PPV23          Overdue - MAMMOGRAM (Yearly) Overdue since 2/2/2019 02/02/2018  MA-MAMMO DIAGNOSTIC UNILAT W/ROCHELLE W/CAD RIGHT    12/03/2014  MA-SCREENING MAMMOGRAM W/ CAD    11/18/2013  MA-SCREENING MAMMOGRAM W/ CAD    11/15/2012  MA-SCREENING MAMMOGRAM W/ CAD    06/26/2008  MA-SCREENING DIGITAL MAMMO    Only the first 5 history entries have been loaded, but more history exists.          Overdue - BONE DENSITY (Every 5 Years) Overdue since 12/3/2019    12/03/2014  DS-BONE DENSITY STUDY (DEXA)    06/07/2006  DS-BONE DENSITY STUDY (DEXA)          Annual Wellness Visit (Every 366 Days) Next due on 2/5/2023 02/04/2022  Visit Dx: Medicare annual wellness visit, subsequent     "02/04/2022  Subsequent Annual Wellness Visit - Includes PPPS ()    12/02/2021  Level of Service: ANNUAL WELLNESS VISIT-INCLUDES PPPS SUBSEQUE*          IMM INFLUENZA (Series Information) Completed    10/16/2021  Outside Immunization: Influenza Quad Adjuvanted    10/16/2020  Outside Immunization: Fluzone High-Dose Quad    10/10/2019  Outside Immunization: Influenza, High Dose    09/28/2018  Outside Immunization: Influenza Quad Inj P    10/18/2017  Outside Immunization: Influenza, P-Free    Only the first 5 history entries have been loaded, but more history exists.          COVID-19 Vaccine (Series Information) Completed    01/06/2022  Imm Admin: Moderna SARS-CoV-2 Vaccine    04/10/2021  Imm Admin: Moderna SARS-CoV-2 Vaccine    03/12/2021  Imm Admin: Moderna SARS-CoV-2 Vaccine          IMM HEP B VACCINE (Series Information) Aged Out    No completion history exists for this topic.          IMM MENINGOCOCCAL VACCINE (MCV4) (Series Information) Aged Out    No completion history exists for this topic.                Patient Care Team:  Zahra Paz M.D. as PCP - General (Geriatrics)        Social History     Tobacco Use   • Smoking status: Never Smoker   • Smokeless tobacco: Never Used   Vaping Use   • Vaping Use: Never used   Substance Use Topics   • Alcohol use: No   • Drug use: No     Family History   Problem Relation Age of Onset   • Cancer Mother    • Diabetes Father      She  has a past medical history of Hyperlipidemia, Hypertension, and Hypothyroid.   Past Surgical History:   Procedure Laterality Date   • OTHER ABDOMINAL SURGERY  01/01/2001    Gastric bypass   • APPENDECTOMY  48 years ago   • OTHER      tonsilectomy       Exam:   /72 (BP Location: Left arm, Patient Position: Sitting, BP Cuff Size: Adult)   Pulse 100   Temp 36.8 °C (98.2 °F) (Temporal)   Resp 16   Ht 1.575 m (5' 2\")   Wt 99.3 kg (219 lb)   SpO2 94%  Body mass index is 40.06 kg/m².    Hearing, good.    Dentition, " good  Alert, oriented in no acute distress.  Eye contact is good, speech goal directed, affect calm    Assessment and Plan. The following treatment and monitoring plan is recommended:    66 y.o. female     1. Medicare annual wellness visit, subsequent  Preventive measures and chronic medical issues reviewed.    - Subsequent Annual Wellness Visit - Includes PPPS ()    2. Essential hypertension  Controlled.    Continue on amlodipine-olmesartan 5-40 mg daily, and hydrochlorothiazide 12.5 mg daily    - Subsequent Annual Wellness Visit - Includes PPPS ()  - CBC WITH DIFFERENTIAL; Future  - Comp Metabolic Panel; Future  - Lipid Profile; Future    3. Acquired hypothyroidism  He has been tolerating Levothyroxine, no palpitation, no cold or heat intolerance  Continue on levothyroxine 125 mcg daily    - Subsequent Annual Wellness Visit - Includes PPPS ()  - TSH+FREE T4    4. Mixed hyperlipidemia  He has been tolerating the statin. Denies muscle pain LFTs has been normal  Continue on lovastatin 20 mg daily    - Subsequent Annual Wellness Visit - Includes PPPS ()  - Lipid Profile; Future    5. Chronic insomnia  Patient has been doing fine on trazodone 50 mg daily    - Subsequent Annual Wellness Visit - Includes PPPS ()    6. Screening mammogram for breast cancer  Due for mammogram.    - Subsequent Annual Wellness Visit - Includes PPPS ()  - MA-SCREENING MAMMO BILAT W/CAD; Future    7. Colon cancer screening  Due for colonoscopy.  Last colonoscopy was 12 years ago.    - Subsequent Annual Wellness Visit - Includes PPPS ()  - Referral to GI for Colonoscopy          Services suggested:   Health Care Screening: Age-appropriate preventive services recommended by USPTF and ACIP covered by Medicare were discussed today. Services ordered if indicated and agreed upon by the patient.  Referrals offered: Community-based lifestyle interventions to reduce health risks and promote self-management and  wellness, fall prevention, nutrition, physical activity, tobacco-use cessation, weight loss, and mental health services as per orders if indicated.    Discussion today about general wellness and lifestyle habits:    · Prevent falls and reduce trip hazards; Cautioned about securing or removing rugs.  · Have a working fire alarm and carbon monoxide detector;   · Engage in regular physical activity and social activities     Follow-up: No follow-ups on file.

## 2022-02-12 LAB
ALBUMIN SERPL-MCNC: 3.9 G/DL (ref 3.8–4.8)
ALBUMIN/GLOB SERPL: 1.3 {RATIO} (ref 1.2–2.2)
ALP SERPL-CCNC: 118 IU/L (ref 44–121)
ALT SERPL-CCNC: 16 IU/L (ref 0–32)
AST SERPL-CCNC: 17 IU/L (ref 0–40)
BASOPHILS # BLD AUTO: 0.1 X10E3/UL (ref 0–0.2)
BASOPHILS NFR BLD AUTO: 1 %
BILIRUB SERPL-MCNC: 0.3 MG/DL (ref 0–1.2)
BUN SERPL-MCNC: 21 MG/DL (ref 8–27)
BUN/CREAT SERPL: 31 (ref 12–28)
CALCIUM SERPL-MCNC: 9.6 MG/DL (ref 8.7–10.3)
CHLORIDE SERPL-SCNC: 105 MMOL/L (ref 96–106)
CHOLEST SERPL-MCNC: 149 MG/DL (ref 100–199)
CO2 SERPL-SCNC: 23 MMOL/L (ref 20–29)
CREAT SERPL-MCNC: 0.67 MG/DL (ref 0.57–1)
EOSINOPHIL # BLD AUTO: 0.3 X10E3/UL (ref 0–0.4)
EOSINOPHIL NFR BLD AUTO: 2 %
ERYTHROCYTE [DISTWIDTH] IN BLOOD BY AUTOMATED COUNT: 13.5 % (ref 11.7–15.4)
GLOBULIN SER CALC-MCNC: 2.9 G/DL (ref 1.5–4.5)
GLUCOSE SERPL-MCNC: 78 MG/DL (ref 65–99)
HCT VFR BLD AUTO: 44.9 % (ref 34–46.6)
HDLC SERPL-MCNC: 36 MG/DL
HGB BLD-MCNC: 14.6 G/DL (ref 11.1–15.9)
IMM GRANULOCYTES # BLD AUTO: 0.1 X10E3/UL (ref 0–0.1)
IMM GRANULOCYTES NFR BLD AUTO: 1 %
IMMATURE CELLS  115398: ABNORMAL
LABORATORY COMMENT REPORT: ABNORMAL
LDLC SERPL CALC-MCNC: 98 MG/DL (ref 0–99)
LYMPHOCYTES # BLD AUTO: 2.6 X10E3/UL (ref 0.7–3.1)
LYMPHOCYTES NFR BLD AUTO: 14 %
MCH RBC QN AUTO: 27.7 PG (ref 26.6–33)
MCHC RBC AUTO-ENTMCNC: 32.5 G/DL (ref 31.5–35.7)
MCV RBC AUTO: 85 FL (ref 79–97)
MONOCYTES # BLD AUTO: 1.2 X10E3/UL (ref 0.1–0.9)
MONOCYTES NFR BLD AUTO: 6 %
MORPHOLOGY BLD-IMP: ABNORMAL
NEUTROPHILS # BLD AUTO: 14.7 X10E3/UL (ref 1.4–7)
NEUTROPHILS NFR BLD AUTO: 76 %
NRBC BLD AUTO-RTO: ABNORMAL %
PLATELET # BLD AUTO: 272 X10E3/UL (ref 150–450)
POTASSIUM SERPL-SCNC: 3.8 MMOL/L (ref 3.5–5.2)
PROT SERPL-MCNC: 6.8 G/DL (ref 6–8.5)
RBC # BLD AUTO: 5.27 X10E6/UL (ref 3.77–5.28)
SODIUM SERPL-SCNC: 142 MMOL/L (ref 134–144)
TRIGL SERPL-MCNC: 77 MG/DL (ref 0–149)
VLDLC SERPL CALC-MCNC: 15 MG/DL (ref 5–40)
WBC # BLD AUTO: 19 X10E3/UL (ref 3.4–10.8)

## 2022-02-14 DIAGNOSIS — R79.89 ABNORMAL CBC: ICD-10-CM

## 2022-03-17 DIAGNOSIS — E03.9 ACQUIRED HYPOTHYROIDISM: ICD-10-CM

## 2022-03-17 RX ORDER — LEVOTHYROXINE SODIUM 137 UG/1
TABLET ORAL
Qty: 90 TABLET | Refills: 3 | Status: SHIPPED | OUTPATIENT
Start: 2022-03-17 | End: 2023-06-12

## 2022-04-21 DIAGNOSIS — E78.2 MIXED HYPERLIPIDEMIA: ICD-10-CM

## 2022-04-21 RX ORDER — LOVASTATIN 20 MG/1
20 TABLET ORAL DAILY
Qty: 100 TABLET | Refills: 2 | Status: SHIPPED | OUTPATIENT
Start: 2022-04-21 | End: 2023-02-13

## 2022-05-16 ENCOUNTER — PHARMACY VISIT (OUTPATIENT)
Dept: PHARMACY | Facility: MEDICAL CENTER | Age: 67
End: 2022-05-16
Payer: COMMERCIAL

## 2022-05-16 PROCEDURE — RXMED WILLOW AMBULATORY MEDICATION CHARGE: Performed by: INTERNAL MEDICINE

## 2022-06-15 ENCOUNTER — OFFICE VISIT (OUTPATIENT)
Dept: MEDICAL GROUP | Facility: MEDICAL CENTER | Age: 67
End: 2022-06-15
Payer: MEDICARE

## 2022-06-15 VITALS
OXYGEN SATURATION: 95 % | TEMPERATURE: 97.5 F | BODY MASS INDEX: 42.29 KG/M2 | HEART RATE: 81 BPM | WEIGHT: 223.99 LBS | HEIGHT: 61 IN | DIASTOLIC BLOOD PRESSURE: 64 MMHG | SYSTOLIC BLOOD PRESSURE: 116 MMHG

## 2022-06-15 DIAGNOSIS — Z23 NEED FOR VACCINATION: ICD-10-CM

## 2022-06-15 DIAGNOSIS — M79.89 LEG SWELLING: ICD-10-CM

## 2022-06-15 DIAGNOSIS — F32.1 CURRENT MODERATE EPISODE OF MAJOR DEPRESSIVE DISORDER WITHOUT PRIOR EPISODE (HCC): ICD-10-CM

## 2022-06-15 DIAGNOSIS — K21.9 GASTROESOPHAGEAL REFLUX DISEASE WITHOUT ESOPHAGITIS: ICD-10-CM

## 2022-06-15 DIAGNOSIS — L98.9 SKIN LESION: ICD-10-CM

## 2022-06-15 PROCEDURE — 99214 OFFICE O/P EST MOD 30 MIN: CPT | Mod: 25 | Performed by: FAMILY MEDICINE

## 2022-06-15 PROCEDURE — 90471 IMMUNIZATION ADMIN: CPT | Performed by: FAMILY MEDICINE

## 2022-06-15 PROCEDURE — 90715 TDAP VACCINE 7 YRS/> IM: CPT | Performed by: FAMILY MEDICINE

## 2022-06-15 RX ORDER — SERTRALINE HYDROCHLORIDE 25 MG/1
25 TABLET, FILM COATED ORAL DAILY
Qty: 30 TABLET | Refills: 11 | Status: SHIPPED | OUTPATIENT
Start: 2022-06-15 | End: 2023-04-26

## 2022-06-15 ASSESSMENT — FIBROSIS 4 INDEX: FIB4 SCORE: 1.0625

## 2022-06-15 NOTE — PROGRESS NOTES
CC: Leg swelling, skin lesion, depression, acid reflux    HPI:   Deloris presents today to discuss the following:    Leg swelling  Patient reported right leg swelling that has been chronic but lately it has worsened, and it becomes tender.  Denies any shortness of breath, or chest pain.  Patient has normal oxygen saturation and heart rate.  She does not have a history of a long trip, but she has been staying home sitting all day, feeling depressed after the death of her mom 2 weeks ago.    Skin lesion  Has been having vesicular skin lesion just below the neck/on the upper chest, slightly tender, has been there for more than 6 months.    Current moderate episode of major depressive disorder without prior episode (HCC)  Patient lost her mom recently, 2 weeks ago.  She has been caregiver for many years.  She has been fairly depressed, she does not eat well or sleep well, that she denies any suicidal ideation.  PHQ score:14    Gastroesophageal reflux disease without esophagitis  Patient is history of intermittent acid reflux.  That seems her mom  it has worsened, it becomes not only a daily issue, but whenever she eats.  However she denies any epigastric pain, nausea, vomiting.  She has been taking Pepto-Bismol and Tums and they have not been helping.     Due for Tdap vaccine    Patient Active Problem List    Diagnosis Date Noted   • Hyperglycemia 2021   • Hypokalemia 2021   • Leukocytosis 2021   • BMI 40.0-44.9, adult ([facility]) 2021   • Morbid obesity with BMI of 40.0-44.9, adult ([facility]) 2021   • Cataracts, bilateral 2021   • Mixed hyperlipidemia 2018   • Essential hypertension 2018   • Osteopenia 2018   • Hypothyroid 2018       Current Outpatient Medications   Medication Sig Dispense Refill   • sertraline (ZOLOFT) 25 MG tablet Take 1 Tablet by mouth every day. 30 Tablet 11   • COVID-19 mRNA vaccine, Moderna, (COVID-19 VACCINE) 100 MCG/0.5ML Suspension injection  "Inject  into the shoulder, thigh, or buttocks. 0.25 mL 0   • lovastatin (MEVACOR) 20 MG Tab Take 1 Tablet by mouth every day. 100 Tablet 2   • levothyroxine (SYNTHROID) 137 MCG Tab TAKE 1 TABLET BY MOUTH IN THE MORING EVERY SATURDAY AND SUNDAY 90 Tablet 3   • COVID-19 mRNA vaccine, Moderna, (MODERNA COVID-19 VACCINE) 100 MCG/0.5ML Suspension injection Inject  into the shoulder, thigh, or buttocks. 0.25 mL 0   • traZODone (DESYREL) 50 MG Tab Take 25 mg by mouth every evening.     • amLODIPine-Olmesartan 5-40 MG Tab Take 1 Tablet by mouth every day. 100 Tablet 0   • hydrochlorothiazide (MICROZIDE) 12.5 MG capsule TAKE 1 CAPSULE BY MOUTH EVERY DAY 90 Capsule 2   • levothyroxine (SYNTHROID) 125 MCG Tab TAKE 1 TABLET BY MOUTH EVERY MORNING ON AN EMPTY STOMACH 90 Tablet 3   • erythromycin 5 MG/GM Ointment Place 1 Application in both eyes 2 times a day. 1 Tube 0   • tizanidine (ZANAFLEX) 4 MG Tab Take 4 mg by mouth every 8 hours.     • neomycin sulf/polymyx B sulf/HC soln (CORTISPORIN HC SOL) 3.5-83010-8 Solution Place 3 Drops in right ear 3 times a day. Administer drops to both ears. 1 Bottle 0     No current facility-administered medications for this visit.         Allergies as of 06/15/2022   • (No Known Allergies)        ROS: Denies any chest pain, Shortness of breath, Changes bowel or bladder, Lower extremity edema.    Physical Exam:  /64 (BP Location: Left arm, Patient Position: Sitting, BP Cuff Size: Adult)   Pulse 81   Temp 36.4 °C (97.5 °F) (Temporal)   Ht 1.549 m (5' 1\")   Wt 102 kg (223 lb 15.8 oz)   SpO2 95%   BMI 42.32 kg/m²   Gen.: Well-developed, well-nourished, no apparent distress,pleasant and cooperative with the examination  Skin:  Warm and dry with good turgor. No rashes or suspicious lesions in visible areas  Eye: PERRLA, conjunctiva and sclera clear, lids normal  HEENT:Sinuses nontender with palpation, TMs clear, nares patent with pink mucosa, and clear rhinorrhea,no septal deviation " ,polyps or lesions. lips without lesions, oropharynx clear.  Neck: Trachea midline,no masses or adenopathy. No JVD.  Thyroid: normal consistency and size. No masses or nodules. Not tender with palpation.  Cor: Regular rate and rhythm without murmur, gallop or rub.  Lungs: Respirations unlabored.Clear to auscultation with equal breath sounds bilaterally. No wheezes, rhonchi.  Abdomen: Soft nontender without hepatosplenomegaly or masses appreciated, normoactive bowel sounds. No hernias.  Extremities: Right leg swelling and tenderness  Psych: Alert and oriented x 3.Flat affect, judgement,insight and memory.        Assessment and Plan.   66 y.o. female     1. Need for vaccination  Tdap is given today.    - Tdap Vaccine =>8YO IM    2. Leg swelling  Rule out DVT    - US-EXTREMITY VENOUS LOWER UNILAT RIGHT; Future    3. Skin lesion  R/O BCC    - Referral to Dermatology    4. Current moderate episode of major depressive disorder without prior episode (HCC)  Excessive grief reaction.  PHQ score:14  We will start patient on sertraline small dose    - sertraline (ZOLOFT) 25 MG tablet; Take 1 Tablet by mouth every day.  Dispense: 30 Tablet; Refill: 11    5. Gastroesophageal reflux disease without esophagitis  Patient advised taking Prilosec 20 mg twice a day for 2 weeks  Requested a referral to a GI for an evaluation with possibly EGD  - Referral to Gastroenterology

## 2022-06-24 ENCOUNTER — OFFICE VISIT (OUTPATIENT)
Dept: DERMATOLOGY | Facility: IMAGING CENTER | Age: 67
End: 2022-06-24
Payer: MEDICARE

## 2022-06-24 DIAGNOSIS — D49.2 NEOPLASM OF SKIN: ICD-10-CM

## 2022-06-24 DIAGNOSIS — L81.4 LENTIGO: ICD-10-CM

## 2022-06-24 DIAGNOSIS — D18.01 CHERRY ANGIOMA: ICD-10-CM

## 2022-06-24 DIAGNOSIS — L82.1 SEBORRHEIC KERATOSIS: ICD-10-CM

## 2022-06-24 PROCEDURE — 99203 OFFICE O/P NEW LOW 30 MIN: CPT | Mod: 25 | Performed by: DERMATOLOGY

## 2022-06-24 PROCEDURE — 11102 TANGNTL BX SKIN SINGLE LES: CPT | Performed by: DERMATOLOGY

## 2022-06-24 NOTE — PROGRESS NOTES
CC: skin lesion on neck     Subjective: new seen patient here for skin lesion     HPI/location: neck   Time present: 3 mths   Painful lesion: Yes  Itching lesion: No  Enlarging lesion: No  Anything make it better or worse?    History of skin cancer: No  History of precancers/actinic keratoses: No  History of biopsies:No  History of blistering/severe sunburns:No  Family history of skin cancer:No  Family history of atypical moles:No    ROS: no fevers/chills. No itch.  No joint pain  Relevant PMH: hypothyroid  Social: NS    PE: Gen:WDWN female in NAD. Focal exam: pearly papule neck, anterior approx 1 cm. Scattered tan macules, waxy papules and cherry red papules on back, appearing benign    A/P: Neoplasm NOS: r/o bcc, neck  -consent for bx, including R/B/A. Cleaned with EtOH, anesthesia with lidocaine 1% + epinephrine, shave bx, AlCl3 for hemostasis  -vaseline/bandage and wound care reviewed    Nevi: benign appearing:  -Reviewed skin cancer detection/prevention  -RTC PRN growth/changes/concerning features    Lentigos/SKs: benign  -reassurance    Cherry angioma: benign    F/u PRN      I have reviewed medications relevant to my specialty.

## 2022-08-05 DIAGNOSIS — I10 ESSENTIAL HYPERTENSION: ICD-10-CM

## 2022-08-05 RX ORDER — HYDROCHLOROTHIAZIDE 12.5 MG/1
CAPSULE, GELATIN COATED ORAL
Qty: 90 CAPSULE | Refills: 2 | Status: SHIPPED | OUTPATIENT
Start: 2022-08-05 | End: 2023-04-26

## 2022-11-01 DIAGNOSIS — E03.9 ACQUIRED HYPOTHYROIDISM: ICD-10-CM

## 2022-11-01 RX ORDER — LEVOTHYROXINE SODIUM 0.12 MG/1
TABLET ORAL
Qty: 90 TABLET | Refills: 3 | Status: SHIPPED | OUTPATIENT
Start: 2022-11-01 | End: 2023-10-23

## 2022-11-02 ENCOUNTER — PHARMACY VISIT (OUTPATIENT)
Dept: PHARMACY | Facility: MEDICAL CENTER | Age: 67
End: 2022-11-02
Payer: COMMERCIAL

## 2022-11-02 PROCEDURE — RXMED WILLOW AMBULATORY MEDICATION CHARGE: Performed by: INTERNAL MEDICINE

## 2023-02-12 DIAGNOSIS — E78.2 MIXED HYPERLIPIDEMIA: ICD-10-CM

## 2023-02-13 RX ORDER — LOVASTATIN 20 MG/1
20 TABLET ORAL DAILY
Qty: 100 TABLET | Refills: 2 | Status: SHIPPED | OUTPATIENT
Start: 2023-02-13 | End: 2023-12-20

## 2023-02-17 ENCOUNTER — OFFICE VISIT (OUTPATIENT)
Dept: DERMATOLOGY | Facility: IMAGING CENTER | Age: 68
End: 2023-02-17
Payer: MEDICARE

## 2023-02-17 DIAGNOSIS — L81.4 LENTIGO: ICD-10-CM

## 2023-02-17 DIAGNOSIS — D18.01 CHERRY ANGIOMA: ICD-10-CM

## 2023-02-17 DIAGNOSIS — D49.2 NEOPLASM OF SKIN: ICD-10-CM

## 2023-02-17 DIAGNOSIS — Z12.83 SKIN CANCER SCREENING: ICD-10-CM

## 2023-02-17 DIAGNOSIS — L82.1 SEBORRHEIC KERATOSIS: ICD-10-CM

## 2023-02-17 DIAGNOSIS — D22.9 NEVUS: ICD-10-CM

## 2023-02-17 DIAGNOSIS — Z85.828 HISTORY OF BASAL CELL CARCINOMA: ICD-10-CM

## 2023-02-17 DIAGNOSIS — L90.8 SKIN AGING: ICD-10-CM

## 2023-02-17 PROCEDURE — 11104 PUNCH BX SKIN SINGLE LESION: CPT | Performed by: DERMATOLOGY

## 2023-02-17 PROCEDURE — 99213 OFFICE O/P EST LOW 20 MIN: CPT | Mod: 25 | Performed by: DERMATOLOGY

## 2023-02-17 NOTE — PROGRESS NOTES
CC: BUSTER Spot center of neck    Subjective: Pt here today for BUSTER. Nothing new of concern    Denies sites I/B/C/B      History of skin cancer: Yes, Details: BCC Neck 06/2022  - free margins - choosing to observe for recurrence/persistence over time  History of precancers/actinic keratoses: No  History of biopsies:Yes, Details: See above  History of blistering/severe sunburns:No  Family history of skin cancer:No  Family history of atypical moles:No    ROS: no fevers/chills. No itch.  No cough  Relevant PMH: hypothyroid  Social: NS    PE: Gen:WDWN female in NAD. Skin: Scalp/face/eyes/lips/neck/chest/back/arms/legs/hands/feet/buttocks - without suspicious lesions noted.  Genitals exam declined  -scattered hyperpigmented macules/papules appearing on torso/extremities, appearing benign without suspicious features  -cherry red macules/papules on torso/extremities  -edematous plaque on lower right leg, pink without notable pigment. No surface change    A/P: Hx of skin cancer:  -cont'd sunprotection and skin cancer surveillance  -Q 6mo-annual exam recommended; f/u suspicious lesions PRN    Nevi: benign appearing:  -Reviewed skin cancer detection/prevention  -RTC PRN growth/changes/concerning features    Lentigos/SKs: benign  -reassurance    Cherry angioma: benign    Neoplasm NOS: r lower leg. Eval for infiltrative cells - B/T cell lymphoma vs other  -consent for bx, including R/B/A. Cleaned with EtOH, anesthesia with lidocaine 1% + epinephrine, 3mm punch bx, 4-0 Prolene to close  -vaseline/bandage and wound care reviewed  -s/r in 2 weeks      Obesity, chronic/persistent    I have reviewed medications relevant to my specialty.

## 2023-02-24 ENCOUNTER — TELEPHONE (OUTPATIENT)
Dept: DERMATOLOGY | Facility: IMAGING CENTER | Age: 68
End: 2023-02-24
Payer: MEDICARE

## 2023-02-24 NOTE — TELEPHONE ENCOUNTER
Pt informed of path results. Benign, No further tx needed. Pt understood. No further questions asked. D-path scanned in media

## 2023-03-02 ENCOUNTER — APPOINTMENT (OUTPATIENT)
Dept: DERMATOLOGY | Facility: IMAGING CENTER | Age: 68
End: 2023-03-02
Payer: MEDICARE

## 2023-03-09 ENCOUNTER — TELEPHONE (OUTPATIENT)
Dept: HEALTH INFORMATION MANAGEMENT | Facility: OTHER | Age: 68
End: 2023-03-09
Payer: MEDICARE

## 2023-03-20 ENCOUNTER — HOSPITAL ENCOUNTER (OUTPATIENT)
Dept: RADIOLOGY | Facility: MEDICAL CENTER | Age: 68
End: 2023-03-20
Attending: FAMILY MEDICINE
Payer: MEDICARE

## 2023-03-20 DIAGNOSIS — Z12.31 VISIT FOR SCREENING MAMMOGRAM: ICD-10-CM

## 2023-03-20 PROCEDURE — 77063 BREAST TOMOSYNTHESIS BI: CPT

## 2023-04-07 ENCOUNTER — DOCUMENTATION (OUTPATIENT)
Dept: HEALTH INFORMATION MANAGEMENT | Facility: OTHER | Age: 68
End: 2023-04-07
Payer: MEDICARE

## 2023-04-26 DIAGNOSIS — F32.1 CURRENT MODERATE EPISODE OF MAJOR DEPRESSIVE DISORDER WITHOUT PRIOR EPISODE (HCC): ICD-10-CM

## 2023-04-26 DIAGNOSIS — I10 ESSENTIAL HYPERTENSION: ICD-10-CM

## 2023-04-26 RX ORDER — SERTRALINE HYDROCHLORIDE 25 MG/1
25 TABLET, FILM COATED ORAL DAILY
Qty: 30 TABLET | Refills: 11 | Status: SHIPPED | OUTPATIENT
Start: 2023-04-26 | End: 2023-10-31

## 2023-04-26 RX ORDER — HYDROCHLOROTHIAZIDE 12.5 MG/1
CAPSULE, GELATIN COATED ORAL
Qty: 90 CAPSULE | Refills: 2 | Status: SHIPPED | OUTPATIENT
Start: 2023-04-26 | End: 2023-08-09 | Stop reason: SDUPTHER

## 2023-06-07 ENCOUNTER — APPOINTMENT (OUTPATIENT)
Dept: RADIOLOGY | Facility: MEDICAL CENTER | Age: 68
End: 2023-06-07
Attending: FAMILY MEDICINE
Payer: MEDICARE

## 2023-06-07 DIAGNOSIS — M79.89 LEG SWELLING: ICD-10-CM

## 2023-06-07 PROCEDURE — 93971 EXTREMITY STUDY: CPT | Mod: RT

## 2023-06-10 DIAGNOSIS — E03.9 ACQUIRED HYPOTHYROIDISM: ICD-10-CM

## 2023-06-12 RX ORDER — LEVOTHYROXINE SODIUM 137 UG/1
TABLET ORAL
Qty: 90 TABLET | Refills: 3 | Status: SHIPPED | OUTPATIENT
Start: 2023-06-12

## 2023-09-22 ENCOUNTER — PHARMACY VISIT (OUTPATIENT)
Dept: PHARMACY | Facility: MEDICAL CENTER | Age: 68
End: 2023-09-22
Payer: COMMERCIAL

## 2023-09-22 PROCEDURE — RXMED WILLOW AMBULATORY MEDICATION CHARGE: Performed by: INTERNAL MEDICINE

## 2023-09-22 RX ORDER — INFLUENZA A VIRUS A/MICHIGAN/45/2015 X-275 (H1N1) ANTIGEN (FORMALDEHYDE INACTIVATED), INFLUENZA A VIRUS A/SINGAPORE/INFIMH-16-0019/2016 IVR-186 (H3N2) ANTIGEN (FORMALDEHYDE INACTIVATED), INFLUENZA B VIRUS B/PHUKET/3073/2013 ANTIGEN (FORMALDEHYDE INACTIVATED), AND INFLUENZA B VIRUS B/MARYLAND/15/2016 BX-69A ANTIGEN (FORMALDEHYDE INACTIVATED) 60; 60; 60; 60 UG/.7ML; UG/.7ML; UG/.7ML; UG/.7ML
INJECTION, SUSPENSION INTRAMUSCULAR
Qty: 0.7 ML | Refills: 0 | Status: SHIPPED | OUTPATIENT
Start: 2023-09-22 | End: 2024-03-18

## 2023-10-23 DIAGNOSIS — E03.9 ACQUIRED HYPOTHYROIDISM: ICD-10-CM

## 2023-10-23 RX ORDER — LEVOTHYROXINE SODIUM 0.12 MG/1
TABLET ORAL
Qty: 90 TABLET | Refills: 3 | Status: SHIPPED | OUTPATIENT
Start: 2023-10-23

## 2023-10-23 NOTE — TELEPHONE ENCOUNTER
Received request via: Pharmacy    Was the patient seen in the last year in this department? No    Does the patient have an active prescription (recently filled or refills available) for medication(s) requested? No    Does the patient have half-way Plus and need 100 day supply (blood pressure, diabetes and cholesterol meds only)? Medication is not for cholesterol, blood pressure or diabetes

## 2023-10-31 ENCOUNTER — OFFICE VISIT (OUTPATIENT)
Dept: MEDICAL GROUP | Facility: MEDICAL CENTER | Age: 68
End: 2023-10-31
Payer: MEDICARE

## 2023-10-31 ENCOUNTER — HOSPITAL ENCOUNTER (OUTPATIENT)
Dept: RADIOLOGY | Facility: MEDICAL CENTER | Age: 68
End: 2023-10-31
Attending: FAMILY MEDICINE
Payer: MEDICARE

## 2023-10-31 VITALS
HEIGHT: 62 IN | RESPIRATION RATE: 16 BRPM | TEMPERATURE: 98 F | DIASTOLIC BLOOD PRESSURE: 60 MMHG | OXYGEN SATURATION: 94 % | WEIGHT: 228 LBS | SYSTOLIC BLOOD PRESSURE: 110 MMHG | BODY MASS INDEX: 41.96 KG/M2 | HEART RATE: 79 BPM

## 2023-10-31 DIAGNOSIS — M54.2 NECK PAIN: ICD-10-CM

## 2023-10-31 DIAGNOSIS — F32.1 CURRENT MODERATE EPISODE OF MAJOR DEPRESSIVE DISORDER WITHOUT PRIOR EPISODE (HCC): ICD-10-CM

## 2023-10-31 DIAGNOSIS — I10 ESSENTIAL HYPERTENSION: ICD-10-CM

## 2023-10-31 DIAGNOSIS — E03.9 ACQUIRED HYPOTHYROIDISM: ICD-10-CM

## 2023-10-31 DIAGNOSIS — E78.2 MIXED HYPERLIPIDEMIA: ICD-10-CM

## 2023-10-31 DIAGNOSIS — E66.01 MORBID OBESITY (HCC): ICD-10-CM

## 2023-10-31 PROBLEM — F32.9 MAJOR DEPRESSIVE DISORDER: Status: ACTIVE | Noted: 2023-10-31

## 2023-10-31 PROCEDURE — 3078F DIAST BP <80 MM HG: CPT | Performed by: FAMILY MEDICINE

## 2023-10-31 PROCEDURE — 72040 X-RAY EXAM NECK SPINE 2-3 VW: CPT

## 2023-10-31 PROCEDURE — 99214 OFFICE O/P EST MOD 30 MIN: CPT | Performed by: FAMILY MEDICINE

## 2023-10-31 PROCEDURE — 3074F SYST BP LT 130 MM HG: CPT | Performed by: FAMILY MEDICINE

## 2023-10-31 RX ORDER — LEVOTHYROXINE SODIUM 0.15 MG/1
1 TABLET ORAL
COMMUNITY
End: 2023-10-31

## 2023-10-31 RX ORDER — HYDROCHLOROTHIAZIDE 12.5 MG/1
1 CAPSULE, GELATIN COATED ORAL
COMMUNITY
End: 2023-10-31

## 2023-10-31 RX ORDER — FLUTICASONE PROPIONATE 50 MCG
SPRAY, SUSPENSION (ML) NASAL
COMMUNITY
End: 2024-03-18

## 2023-10-31 RX ORDER — GABAPENTIN 300 MG/1
CAPSULE ORAL
COMMUNITY
End: 2023-10-31

## 2023-10-31 RX ORDER — LEVOTHYROXINE SODIUM 0.1 MG/1
1 TABLET ORAL
COMMUNITY
End: 2023-10-31

## 2023-10-31 RX ORDER — CLOTRIMAZOLE AND BETAMETHASONE DIPROPIONATE 10; .64 MG/G; MG/G
CREAM TOPICAL
COMMUNITY
End: 2023-10-31

## 2023-10-31 RX ORDER — AZITHROMYCIN 250 MG/1
TABLET, FILM COATED ORAL
COMMUNITY
End: 2023-10-31

## 2023-10-31 RX ORDER — DIETHYLPROPION HYDROCHLORIDE 25 MG/1
TABLET ORAL
COMMUNITY
End: 2023-10-31

## 2023-10-31 RX ORDER — HYDROCODONE BITARTRATE AND ACETAMINOPHEN 7.5; 325 MG/1; MG/1
TABLET ORAL
COMMUNITY
End: 2023-10-31

## 2023-10-31 RX ORDER — ZOSTER VACCINE LIVE 19400 [PFU]/.65ML
INJECTION, POWDER, LYOPHILIZED, FOR SUSPENSION SUBCUTANEOUS
COMMUNITY
End: 2023-10-31

## 2023-10-31 RX ORDER — RISEDRONATE SODIUM 150 MG/1
TABLET, FILM COATED ORAL
COMMUNITY
End: 2023-10-31

## 2023-10-31 RX ORDER — CHOLECALCIFEROL (VITAMIN D3) 1250 MCG
CAPSULE ORAL
COMMUNITY

## 2023-10-31 RX ORDER — GABAPENTIN 100 MG/1
100 CAPSULE ORAL 3 TIMES DAILY
Qty: 180 CAPSULE | Refills: 2 | Status: SHIPPED | OUTPATIENT
Start: 2023-10-31

## 2023-10-31 RX ORDER — METAXALONE 800 MG/1
TABLET ORAL
COMMUNITY
End: 2023-10-31

## 2023-10-31 RX ORDER — AMOXICILLIN AND CLAVULANATE POTASSIUM 875; 125 MG/1; MG/1
TABLET, FILM COATED ORAL
COMMUNITY
End: 2023-10-31

## 2023-10-31 RX ORDER — TIZANIDINE 4 MG/1
1 TABLET ORAL EVERY 8 HOURS
COMMUNITY
End: 2023-10-31

## 2023-10-31 RX ORDER — DULOXETIN HYDROCHLORIDE 30 MG/1
30 CAPSULE, DELAYED RELEASE ORAL DAILY
Qty: 90 CAPSULE | Refills: 2 | Status: SHIPPED | OUTPATIENT
Start: 2023-10-31

## 2023-10-31 RX ORDER — LEVOTHYROXINE SODIUM 0.12 MG/1
1 TABLET ORAL
COMMUNITY
End: 2023-10-31

## 2023-10-31 ASSESSMENT — PATIENT HEALTH QUESTIONNAIRE - PHQ9
5. POOR APPETITE OR OVEREATING: 2
2. FEELING DOWN, DEPRESSED, IRRITABLE, OR HOPELESS: 2
1. LITTLE INTEREST OR PLEASURE IN DOING THINGS: 2
SUM OF ALL RESPONSES TO PHQ9 QUESTIONS 1 AND 2: 4
SUM OF ALL RESPONSES TO PHQ QUESTIONS 1-9: 15
6. FEELING BAD ABOUT YOURSELF - OR THAT YOU ARE A FAILURE OR HAVE LET YOURSELF OR YOUR FAMILY DOWN: 2
CLINICAL INTERPRETATION OF PHQ2 SCORE: 2
5. POOR APPETITE OR OVEREATING: 2 - MORE THAN HALF THE DAYS
9. THOUGHTS THAT YOU WOULD BE BETTER OFF DEAD, OR OF HURTING YOURSELF: 0
SUM OF ALL RESPONSES TO PHQ QUESTIONS 1-9: 15
7. TROUBLE CONCENTRATING ON THINGS, SUCH AS READING THE NEWSPAPER OR WATCHING TELEVISION: 2
3. TROUBLE FALLING OR STAYING ASLEEP OR SLEEPING TOO MUCH: 2
4. FEELING TIRED OR HAVING LITTLE ENERGY: 2
8. MOVING OR SPEAKING SO SLOWLY THAT OTHER PEOPLE COULD HAVE NOTICED. OR THE OPPOSITE, BEING SO FIGETY OR RESTLESS THAT YOU HAVE BEEN MOVING AROUND A LOT MORE THAN USUAL: 1

## 2023-10-31 ASSESSMENT — FIBROSIS 4 INDEX: FIB4 SCORE: 1.08

## 2023-10-31 NOTE — PROGRESS NOTES
CC: Depression, hypertension, hyperlipidemia, hypothyroidism, neck pain, morbid obesity    HPI:   Deloris presents today to discuss the following:    Current moderate episode of major depressive disorder without prior episode (HCC)  Patient has depression gets a little bit worse.  Her PHQ score today is 15.  Patient has lost her mom few months ago.  She has been her caregiver for many years.  She has currently been babysitting and taking care of her grand kids.  She has not been able to do anything because of her depression.  She does not enjoy things she used to enjoy.  Feels guilty, tired and weak.  However she denies any suicidal ideation but she all she sleeps and never wake up.  She has been on Synthroid in 25 mg but has not been helping much.    Essential hypertension  Blood pressure has been under good controlled on hydrochlorothiazide 12.5 mg daily, and amlodipine-olmesartan 5-40 mg daily.  No side effects    Mixed hyperlipidemia  She has been tolerating the statin. Denies muscle pain LFTs has been normal.  Has been on lovastatin 20 mg daily    Acquired hypothyroidism  She has been tolerating Levothyroxine, no palpitation, no cold or heat intolerance, but she has been feeling depressed.  She is currently on levothyroxine 125 mcg daily.  Patient is due for TSH/free T4 check.    Neck pain  Patient has been having neck pain and sometimes stiffness radiates to the right shoulder and right upper arm.  Denies any neck trauma.  Denies weakness or numbness of the upper and lower extremities.      Morbid obesity (HCC)  BMI is 41.7.The medical rationale for weight loss in obese individuals is that obesity is associated with a significant increase in mortality, and many health risks including type 2 diabetes mellitus, hypertension, dyslipidemia, and coronary heart disease. The benefits of weight loss include a reduction in the rate of progression from impaired glucose tolerance to diabetes, blood pressure in  hypertensive patients, and lipid levels in higher risk patients. Other noncardiac benefits of weight loss include reductions in urinary incontinence, sleep apnea, and depression, and improvements in quality of life, physical functioning, and mobility.Recommend lifestyle modification: exercise 30 minutes per day 5 days per week. Recommend also portion control.      Patient Active Problem List    Diagnosis Date Noted    Hyperglycemia 12/02/2021    Hypokalemia 12/02/2021    Leukocytosis 12/02/2021    BMI 40.0-44.9, adult ([facility]) 12/02/2021    Morbid obesity with BMI of 40.0-44.9, adult ([facility]) 12/02/2021    Cataracts, bilateral 12/02/2021    Mixed hyperlipidemia 12/18/2018    Essential hypertension 12/18/2018    Osteopenia 12/18/2018    Hypothyroid 01/13/2018       Current Outpatient Medications   Medication Sig Dispense Refill    fluticasone (FLONASE) 50 MCG/ACT nasal spray Inhale 2 sprays every day by intranasal route for 30 days.      Cholecalciferol (VITAMIN D3) 1.25 MG (06990 UT) Cap 1 tab daily-OTC      levothyroxine (SYNTHROID) 125 MCG Tab TAKE 1 TABLET BY MOUTH EVERY MORNING ON AN EMPTY STOMACH 90 Tablet 3    COVID-19mRNA Harrison-S Vac 11yo or older Pfizer (CONFIRNATY) 30 MCG/0.3ML Suspension injection Inject  into the shoulder, thigh, or buttocks. 0.3 mL 0    influenza Vac High-Dose Quad (FLUZONE HIGH-DOSE QUADRIVALENT) 0.7 ML Suspension Prefilled Syringe injection Inject  into the shoulder, thigh, or buttocks. 0.7 mL 0    hydrochlorothiazide (MICROZIDE) 12.5 MG capsule Take 1 Capsule by mouth every day. 90 Capsule 2    amLODIPine-Olmesartan 5-40 MG Tab Take 1 Tablet by mouth every day. 100 Tablet 2    levothyroxine (SYNTHROID) 137 MCG Tab TAKE 1 TABLET BY MOUTH EVERY MORNING EVERY SATURDAY AND SUNDAY 90 Tablet 3    sertraline (ZOLOFT) 25 MG tablet TAKE 1 TABLET BY MOUTH EVERY DAY 30 Tablet 11    lovastatin (MEVACOR) 20 MG Tab TAKE 1 TABLET BY MOUTH EVERY  Tablet 2    influenza Vac High-Dose Quad (FLUZONE  "HIGH-DOSE QUADRIVALENT) 0.7 ML Suspension Prefilled Syringe injection Inject  into the shoulder, thigh, or buttocks. 0.7 mL 0    COVID-19mRNA Bival Vac Moderna (MODERNA COVID-19 BIVALENT) 50 MCG/0.5ML Suspension injection Inject  into the shoulder, thigh, or buttocks. 0.5 mL 0    COVID-19 mRNA vaccine, Moderna, (COVID-19 VACCINE) 100 MCG/0.5ML Suspension injection Inject  into the shoulder, thigh, or buttocks. 0.25 mL 0    COVID-19 mRNA vaccine, Moderna, (MODERNA COVID-19 VACCINE) 100 MCG/0.5ML Suspension injection Inject  into the shoulder, thigh, or buttocks. 0.25 mL 0     No current facility-administered medications for this visit.         Allergies as of 10/31/2023    (No Known Allergies)        ROS: Denies any chest pain, Shortness of breath, Changes bowel or bladder, Lower extremity edema.    Physical Exam:  /60 (BP Location: Right arm, Patient Position: Sitting, BP Cuff Size: Adult)   Pulse 79   Temp 36.7 °C (98 °F) (Temporal)   Resp 16   Ht 1.575 m (5' 2\") Comment: patient stated  Wt 103 kg (228 lb)   SpO2 94%   BMI 41.70 kg/m²   Gen.: Well-developed, well-nourished, no apparent distress,pleasant and cooperative with the examination  Skin:  Warm and dry with good turgor. No rashes or suspicious lesions in visible areas  HEENT:Sinuses nontender with palpation, TMs clear, nares patent with pink mucosa and clear rhinorrhea,no septal deviation ,polyps or lesions. lips without lesions, oropharynx clear.  Neck: Trachea midline,no masses or adenopathy. No JVD.  Cor: Regular rate and rhythm without murmur, gallop or rub.  Lungs: Respirations unlabored.Clear to auscultation with equal breath sounds bilaterally. No wheezes, rhonchi.  Extremities: No cyanosis, clubbing or edema.      Assessment and Plan.   67 y.o. female     1. Current moderate episode of major depressive disorder without prior episode (HCC)  Discussed with patient that I will not change sertraline to Cymbalta 30 mg daily because patient " has neuropathic pain of the neck and right arm.  Patient advised to return to the clinic in a months for reevaluation.    - DULoxetine (CYMBALTA) 30 MG Cap DR Particles; Take 1 Capsule by mouth every day.  Dispense: 90 Capsule; Refill: 2    2. Essential hypertension  Controlled.  Continue on hydrochlorothiazide 12.5 mg daily, and amlodipine-olmesartan 5-40 mg daily.    3. Mixed hyperlipidemia  He has been tolerating the statin. Denies muscle pain LFTs has been normal  Continue on lovastatin 20 mg daily    4. Acquired hypothyroidism  She has been tolerating Levothyroxine, no palpitation, no cold or heat intolerance.  However patient has been feeling depressed.  Continue on levothyroxine 125 mcg daily  Advised to check TSH/free T4 as soon as possible.    -TSH/free T4.    5. Neck pain  Possible neuropathic pain due to degenerative disc disease of cervical spine.  We will send patient for an x-ray.  Patient has an appointment with orthopedic clinic in 2 weeks.  We will start patient on gabapentin and Cymbalta    - DX-CERVICAL SPINE-2 OR 3 VIEWS; Future  - DULoxetine (CYMBALTA) 30 MG Cap DR Particles; Take 1 Capsule by mouth every day.  Dispense: 90 Capsule; Refill: 2  - gabapentin (NEURONTIN) 100 MG Cap; Take 1 Capsule by mouth 3 times a day.  Dispense: 180 Capsule; Refill: 2    6. Morbid obesity (HCC)  BMI is 41.7.  Patient is counseled on lifestyle modification.  We will discuss referral to bariatric surgery next visit.

## 2023-12-04 ENCOUNTER — OFFICE VISIT (OUTPATIENT)
Dept: MEDICAL GROUP | Facility: MEDICAL CENTER | Age: 68
End: 2023-12-04
Payer: MEDICARE

## 2023-12-04 VITALS
OXYGEN SATURATION: 92 % | WEIGHT: 224 LBS | RESPIRATION RATE: 18 BRPM | SYSTOLIC BLOOD PRESSURE: 122 MMHG | HEIGHT: 62 IN | DIASTOLIC BLOOD PRESSURE: 86 MMHG | BODY MASS INDEX: 41.22 KG/M2 | HEART RATE: 100 BPM | TEMPERATURE: 98.4 F

## 2023-12-04 DIAGNOSIS — F32.1 CURRENT MODERATE EPISODE OF MAJOR DEPRESSIVE DISORDER WITHOUT PRIOR EPISODE (HCC): ICD-10-CM

## 2023-12-04 DIAGNOSIS — M54.2 NECK PAIN: ICD-10-CM

## 2023-12-04 PROCEDURE — 3079F DIAST BP 80-89 MM HG: CPT | Performed by: FAMILY MEDICINE

## 2023-12-04 PROCEDURE — 99213 OFFICE O/P EST LOW 20 MIN: CPT | Performed by: FAMILY MEDICINE

## 2023-12-04 PROCEDURE — 3074F SYST BP LT 130 MM HG: CPT | Performed by: FAMILY MEDICINE

## 2023-12-04 ASSESSMENT — FIBROSIS 4 INDEX: FIB4 SCORE: 1.09375

## 2023-12-04 NOTE — PROGRESS NOTES
CC: Depression/neuropathic pain    HPI:   Deloris presents today to follow-up after she was started on duloxetine for depression and neuropathic pain of the neck.  Patient stated that since she started the duloxetine her mood has improved a lot.  She has been feeling more active and motivated.  In fact her neuropathic pain on the neck has improved and she does not think she needs to have the epidural injection anymore.      Patient Active Problem List    Diagnosis Date Noted    Major depressive disorder 10/31/2023    Hyperglycemia 12/02/2021    Hypokalemia 12/02/2021    Leukocytosis 12/02/2021    BMI 40.0-44.9, adult (HCC) 12/02/2021    Morbid obesity with BMI of 40.0-44.9, adult ([facility]) 12/02/2021    Cataracts, bilateral 12/02/2021    Mixed hyperlipidemia 12/18/2018    Essential hypertension 12/18/2018    Osteopenia 12/18/2018    Hypothyroid 01/13/2018       Current Outpatient Medications   Medication Sig Dispense Refill    Cholecalciferol (VITAMIN D3) 1.25 MG (11302 UT) Cap 1 tab daily-OTC      DULoxetine (CYMBALTA) 30 MG Cap DR Particles Take 1 Capsule by mouth every day. 90 Capsule 2    gabapentin (NEURONTIN) 100 MG Cap Take 1 Capsule by mouth 3 times a day. 180 Capsule 2    levothyroxine (SYNTHROID) 125 MCG Tab TAKE 1 TABLET BY MOUTH EVERY MORNING ON AN EMPTY STOMACH 90 Tablet 3    COVID-19mRNA Harrison-S Vac 11yo or older Pfizer (CONFIRNATY) 30 MCG/0.3ML Suspension injection Inject  into the shoulder, thigh, or buttocks. 0.3 mL 0    influenza Vac High-Dose Quad (FLUZONE HIGH-DOSE QUADRIVALENT) 0.7 ML Suspension Prefilled Syringe injection Inject  into the shoulder, thigh, or buttocks. 0.7 mL 0    hydrochlorothiazide (MICROZIDE) 12.5 MG capsule Take 1 Capsule by mouth every day. 90 Capsule 2    amLODIPine-Olmesartan 5-40 MG Tab Take 1 Tablet by mouth every day. 100 Tablet 2    levothyroxine (SYNTHROID) 137 MCG Tab TAKE 1 TABLET BY MOUTH EVERY MORNING EVERY SATURDAY AND SUNDAY 90 Tablet 3    lovastatin (MEVACOR) 20 MG  "Tab TAKE 1 TABLET BY MOUTH EVERY  Tablet 2    influenza Vac High-Dose Quad (FLUZONE HIGH-DOSE QUADRIVALENT) 0.7 ML Suspension Prefilled Syringe injection Inject  into the shoulder, thigh, or buttocks. 0.7 mL 0    COVID-19mRNA Bival Vac Moderna (MODERNA COVID-19 BIVALENT) 50 MCG/0.5ML Suspension injection Inject  into the shoulder, thigh, or buttocks. 0.5 mL 0    COVID-19 mRNA vaccine, Moderna, (COVID-19 VACCINE) 100 MCG/0.5ML Suspension injection Inject  into the shoulder, thigh, or buttocks. 0.25 mL 0    COVID-19 mRNA vaccine, Moderna, (MODERNA COVID-19 VACCINE) 100 MCG/0.5ML Suspension injection Inject  into the shoulder, thigh, or buttocks. 0.25 mL 0    fluticasone (FLONASE) 50 MCG/ACT nasal spray Inhale 2 sprays every day by intranasal route for 30 days.       No current facility-administered medications for this visit.         Allergies as of 12/04/2023    (No Known Allergies)        ROS: Denies any chest pain, Shortness of breath, Changes bowel or bladder, Lower extremity edema.    Physical Exam:  /86   Pulse 100   Temp 36.9 °C (98.4 °F) (Temporal)   Resp 18   Ht 1.575 m (5' 2\")   Wt 102 kg (224 lb)   SpO2 92%   BMI 40.97 kg/m²   Gen.: Well-developed, well-nourished, no apparent distress,pleasant and cooperative with the examination  Skin:  Warm and dry with good turgor. No rashes or suspicious lesions in visible areas  HEENT:Sinuses nontender with palpation, TMs clear, nares patent with pink mucosa and clear rhinorrhea,no septal deviation ,polyps or lesions. lips without lesions, oropharynx clear.  Neck: Normal range of motion      Assessment and Plan.   68 y.o. female     1. Current moderate episode of major depressive disorder without prior episode (HCC)  2. Neck pain  Duloxetine has helped a lot with her mood and the neuropathic pain related to cervical degenerative disc issues.  Continue on duloxetine 30 mg DR daily.  Return to the clinic in 3 months.      "

## 2023-12-20 DIAGNOSIS — E78.2 MIXED HYPERLIPIDEMIA: ICD-10-CM

## 2023-12-20 RX ORDER — LOVASTATIN 20 MG/1
20 TABLET ORAL DAILY
Qty: 100 TABLET | Refills: 2 | Status: SHIPPED | OUTPATIENT
Start: 2023-12-20

## 2024-01-10 ENCOUNTER — APPOINTMENT (OUTPATIENT)
Dept: FAMILY PLANNING/WOMEN'S HEALTH CLINIC | Facility: PHYSICIAN GROUP | Age: 69
End: 2024-01-10
Attending: FAMILY MEDICINE

## 2024-01-26 DIAGNOSIS — F32.1 CURRENT MODERATE EPISODE OF MAJOR DEPRESSIVE DISORDER WITHOUT PRIOR EPISODE (HCC): ICD-10-CM

## 2024-01-29 RX ORDER — SERTRALINE HYDROCHLORIDE 25 MG/1
25 TABLET, FILM COATED ORAL DAILY
Qty: 30 TABLET | Refills: 11 | Status: SHIPPED | OUTPATIENT
Start: 2024-01-29

## 2024-03-08 PROBLEM — F32.1 CURRENT MODERATE EPISODE OF MAJOR DEPRESSIVE DISORDER WITHOUT PRIOR EPISODE (HCC): Status: ACTIVE | Noted: 2023-10-31

## 2024-03-11 NOTE — ASSESSMENT & PLAN NOTE
Chronic, stable. Currently taking lovastatin 20 mg daily. Reports that she is not exercising regularly. Denies chest pain, claudication, and dizziness.;

## 2024-03-11 NOTE — ASSESSMENT & PLAN NOTE
Chronic, stable. Currently taking duloxetine 30 mg daily. Reports that mood is good. Denies SI/HI.

## 2024-03-11 NOTE — ASSESSMENT & PLAN NOTE
Chronic, stable. Currently taking levothyroxine 125 mcg daily as prescribed. Denies fatigue, palpitations, hair and skin changes, temperature intolerance, changes in bowel habits, and weight loss or weight gain.

## 2024-03-11 NOTE — ASSESSMENT & PLAN NOTE
Chronic, stable. Currently taking amlodipine-olmesartan 5-40 mg and hydrochlorothiazide 12.5 mg daily. In-office blood pressure is   Denies chest pain, lightheadedness, palpitations, and lower extremity edema.

## 2024-03-12 ENCOUNTER — APPOINTMENT (OUTPATIENT)
Dept: FAMILY PLANNING/WOMEN'S HEALTH CLINIC | Facility: PHYSICIAN GROUP | Age: 69
End: 2024-03-12
Attending: FAMILY MEDICINE

## 2024-03-12 DIAGNOSIS — F32.1 CURRENT MODERATE EPISODE OF MAJOR DEPRESSIVE DISORDER WITHOUT PRIOR EPISODE (HCC): ICD-10-CM

## 2024-03-12 DIAGNOSIS — M85.80 OSTEOPENIA, UNSPECIFIED LOCATION: ICD-10-CM

## 2024-03-12 DIAGNOSIS — I10 ESSENTIAL HYPERTENSION: ICD-10-CM

## 2024-03-12 DIAGNOSIS — E78.2 MIXED HYPERLIPIDEMIA: ICD-10-CM

## 2024-03-12 DIAGNOSIS — E03.4 HYPOTHYROIDISM DUE TO ACQUIRED ATROPHY OF THYROID: ICD-10-CM

## 2024-03-12 DIAGNOSIS — E66.01 MORBID OBESITY WITH BMI OF 40.0-44.9, ADULT (HCC): ICD-10-CM

## 2024-03-13 NOTE — ASSESSMENT & PLAN NOTE
Chronic, stable. Currently taking levothyroxine as prescribed. Denies fatigue, palpitations, hair and skin changes, temperature intolerance, changes in bowel habits, and weight loss or weight gain.

## 2024-03-13 NOTE — ASSESSMENT & PLAN NOTE
Chronic, stable. Currently taking sertraline 25 mg daily. Reports that mood is good. Denies SI/HI.

## 2024-03-13 NOTE — ASSESSMENT & PLAN NOTE
"Chronic, stable. Reviewed DEXA scan from 2014: \"bone mineral density of this patient is osteopenic.\" Denies recent falls or fractures.    "

## 2024-03-13 NOTE — ASSESSMENT & PLAN NOTE
Chronic, stable. Currently taking hydrochlorothiazide 12.5 mg daily. In-office blood pressure is 102/64. Denies chest pain, lightheadedness, palpitations, and lower extremity edema.

## 2024-03-13 NOTE — ASSESSMENT & PLAN NOTE
Chronic, stable. Currently taking lovastatin 20 mg daily. Reports that she is not exercising regularly. Provided [facility] resources. Denies chest pain, claudication, and dizziness.

## 2024-03-13 NOTE — ASSESSMENT & PLAN NOTE
BMI is 40.97kg/m2. Provided education on heart healthy diet with adequate intake of fruits, vegetables, and whole grains. Encourage 30 minutes of moderate exercise 3-4 times a week.

## 2024-03-18 ENCOUNTER — OFFICE VISIT (OUTPATIENT)
Dept: FAMILY PLANNING/WOMEN'S HEALTH CLINIC | Facility: PHYSICIAN GROUP | Age: 69
End: 2024-03-18
Attending: FAMILY MEDICINE

## 2024-03-18 VITALS
BODY MASS INDEX: 41.22 KG/M2 | DIASTOLIC BLOOD PRESSURE: 64 MMHG | WEIGHT: 224 LBS | HEIGHT: 62 IN | SYSTOLIC BLOOD PRESSURE: 102 MMHG

## 2024-03-18 DIAGNOSIS — Z13.820 ENCOUNTER FOR OSTEOPOROSIS SCREENING IN ASYMPTOMATIC POSTMENOPAUSAL PATIENT: ICD-10-CM

## 2024-03-18 DIAGNOSIS — Z78.0 ENCOUNTER FOR OSTEOPOROSIS SCREENING IN ASYMPTOMATIC POSTMENOPAUSAL PATIENT: ICD-10-CM

## 2024-03-18 DIAGNOSIS — M85.80 OSTEOPENIA, UNSPECIFIED LOCATION: ICD-10-CM

## 2024-03-18 DIAGNOSIS — E78.2 MIXED HYPERLIPIDEMIA: ICD-10-CM

## 2024-03-18 DIAGNOSIS — I10 ESSENTIAL HYPERTENSION: ICD-10-CM

## 2024-03-18 DIAGNOSIS — H26.9 CATARACT OF BOTH EYES, UNSPECIFIED CATARACT TYPE: ICD-10-CM

## 2024-03-18 DIAGNOSIS — F51.01 PRIMARY INSOMNIA: ICD-10-CM

## 2024-03-18 DIAGNOSIS — E66.01 MORBID OBESITY WITH BMI OF 40.0-44.9, ADULT (HCC): ICD-10-CM

## 2024-03-18 DIAGNOSIS — E03.4 HYPOTHYROIDISM DUE TO ACQUIRED ATROPHY OF THYROID: ICD-10-CM

## 2024-03-18 DIAGNOSIS — M19.90 ARTHRITIS: ICD-10-CM

## 2024-03-18 DIAGNOSIS — F32.1 CURRENT MODERATE EPISODE OF MAJOR DEPRESSIVE DISORDER WITHOUT PRIOR EPISODE (HCC): ICD-10-CM

## 2024-03-18 DIAGNOSIS — F10.21 ALCOHOLISM IN REMISSION (HCC): ICD-10-CM

## 2024-03-18 PROBLEM — E87.6 HYPOKALEMIA: Status: RESOLVED | Noted: 2021-12-02 | Resolved: 2024-03-18

## 2024-03-18 PROBLEM — R73.9 HYPERGLYCEMIA: Status: RESOLVED | Noted: 2021-12-02 | Resolved: 2024-03-18

## 2024-03-18 PROCEDURE — 1125F AMNT PAIN NOTED PAIN PRSNT: CPT

## 2024-03-18 PROCEDURE — G0439 PPPS, SUBSEQ VISIT: HCPCS

## 2024-03-18 PROCEDURE — 3078F DIAST BP <80 MM HG: CPT

## 2024-03-18 PROCEDURE — 3074F SYST BP LT 130 MM HG: CPT

## 2024-03-18 SDOH — ECONOMIC STABILITY: INCOME INSECURITY: HOW HARD IS IT FOR YOU TO PAY FOR THE VERY BASICS LIKE FOOD, HOUSING, MEDICAL CARE, AND HEATING?: NOT VERY HARD

## 2024-03-18 SDOH — ECONOMIC STABILITY: FOOD INSECURITY: WITHIN THE PAST 12 MONTHS, YOU WORRIED THAT YOUR FOOD WOULD RUN OUT BEFORE YOU GOT MONEY TO BUY MORE.: NEVER TRUE

## 2024-03-18 SDOH — ECONOMIC STABILITY: TRANSPORTATION INSECURITY
IN THE PAST 12 MONTHS, HAS THE LACK OF TRANSPORTATION KEPT YOU FROM MEDICAL APPOINTMENTS OR FROM GETTING MEDICATIONS?: NO

## 2024-03-18 SDOH — ECONOMIC STABILITY: INCOME INSECURITY: IN THE LAST 12 MONTHS, WAS THERE A TIME WHEN YOU WERE NOT ABLE TO PAY THE MORTGAGE OR RENT ON TIME?: NO

## 2024-03-18 SDOH — ECONOMIC STABILITY: FOOD INSECURITY: WITHIN THE PAST 12 MONTHS, THE FOOD YOU BOUGHT JUST DIDN'T LAST AND YOU DIDN'T HAVE MONEY TO GET MORE.: NEVER TRUE

## 2024-03-18 SDOH — ECONOMIC STABILITY: HOUSING INSECURITY
IN THE LAST 12 MONTHS, WAS THERE A TIME WHEN YOU DID NOT HAVE A STEADY PLACE TO SLEEP OR SLEPT IN A SHELTER (INCLUDING NOW)?: NO

## 2024-03-18 SDOH — ECONOMIC STABILITY: HOUSING INSECURITY: IN THE LAST 12 MONTHS, HOW MANY PLACES HAVE YOU LIVED?: 1

## 2024-03-18 SDOH — ECONOMIC STABILITY: TRANSPORTATION INSECURITY
IN THE PAST 12 MONTHS, HAS LACK OF TRANSPORTATION KEPT YOU FROM MEETINGS, WORK, OR FROM GETTING THINGS NEEDED FOR DAILY LIVING?: NO

## 2024-03-18 ASSESSMENT — ENCOUNTER SYMPTOMS: GENERAL WELL-BEING: GOOD

## 2024-03-18 ASSESSMENT — PATIENT HEALTH QUESTIONNAIRE - PHQ9
CLINICAL INTERPRETATION OF PHQ2 SCORE: 2
5. POOR APPETITE OR OVEREATING: 1 - SEVERAL DAYS
1. LITTLE INTEREST OR PLEASURE IN DOING THINGS: SEVERAL DAYS
2. FEELING DOWN, DEPRESSED, IRRITABLE, OR HOPELESS: SEVERAL DAYS

## 2024-03-18 ASSESSMENT — PAIN SCALES - GENERAL: PAINLEVEL: 5=MODERATE PAIN

## 2024-03-18 ASSESSMENT — ACTIVITIES OF DAILY LIVING (ADL): BATHING_REQUIRES_ASSISTANCE: 0

## 2024-03-18 NOTE — ASSESSMENT & PLAN NOTE
Chronic, stable. Reports an average of 6 hours of sleep per night. Denies use of over-the-counter sleep aids.

## 2024-03-18 NOTE — ASSESSMENT & PLAN NOTE
Chronic, stable. Reports that are cataracts are worsening, having more difficulty with reading. Followed by ophthalmology, Bautista Morrison.

## 2024-03-18 NOTE — PROGRESS NOTES
Comprehensive Health Assessment Program     Deloris Brandt is a 68 y.o. here for her comprehensive health assessment.    Patient Active Problem List    Diagnosis Date Noted    Alcoholism in remission ([facility]) 03/18/2024    Primary insomnia 03/18/2024    Arthritis 03/18/2024    Encounter for osteoporosis screening in asymptomatic postmenopausal patient 03/18/2024    Current moderate episode of major depressive disorder without prior episode ([facility]) 10/31/2023    Leukocytosis 12/02/2021    Morbid obesity with BMI of 40.0-44.9, adult ([facility]) 12/02/2021    Cataracts, bilateral 12/02/2021    Mixed hyperlipidemia 12/18/2018    Essential hypertension 12/18/2018    Osteopenia 12/18/2018    Hypothyroid 01/13/2018       Current Outpatient Medications   Medication Sig Dispense Refill    sertraline (ZOLOFT) 25 MG tablet TAKE 1 TABLET BY MOUTH EVERY DAY 30 Tablet 11    lovastatin (MEVACOR) 20 MG Tab TAKE 1 TABLET BY MOUTH EVERY  Tablet 2    Cholecalciferol (VITAMIN D3) 1.25 MG (23395 UT) Cap 1 tab daily-OTC      DULoxetine (CYMBALTA) 30 MG Cap DR Particles Take 1 Capsule by mouth every day. 90 Capsule 2    gabapentin (NEURONTIN) 100 MG Cap Take 1 Capsule by mouth 3 times a day. 180 Capsule 2    levothyroxine (SYNTHROID) 125 MCG Tab TAKE 1 TABLET BY MOUTH EVERY MORNING ON AN EMPTY STOMACH 90 Tablet 3    hydrochlorothiazide (MICROZIDE) 12.5 MG capsule Take 1 Capsule by mouth every day. 90 Capsule 2    amLODIPine-Olmesartan 5-40 MG Tab Take 1 Tablet by mouth every day. 100 Tablet 2    levothyroxine (SYNTHROID) 137 MCG Tab TAKE 1 TABLET BY MOUTH EVERY MORNING EVERY SATURDAY AND SUNDAY 90 Tablet 3     No current facility-administered medications for this visit.          Current supplements as per medication list.     Allergies:   Patient has no known allergies.  Social History     Tobacco Use    Smoking status: Never    Smokeless tobacco: Never   Vaping Use    Vaping Use: Never used   Substance Use Topics    Alcohol use: No     Drug use: No     Family History   Problem Relation Age of Onset    Cancer Mother     Diabetes Father      Deloris  has a past medical history of Hyperglycemia, Hyperlipidemia, Hypertension, Hypokalemia, and Hypothyroid.   Past Surgical History:   Procedure Laterality Date    OTHER ABDOMINAL SURGERY  01/01/2001    Gastric bypass    APPENDECTOMY  48 years ago    OTHER      tonsilectomy       Screening:  In the last six months have you experienced any leakage of urine? No    Depression Screening  Little interest or pleasure in doing things?  1 - several days  Feeling down, depressed , or hopeless? 1 - several days  Trouble falling or staying asleep, or sleeping too much?  1 - several days  Feeling tired or having little energy?  1 - several days  Poor appetite or overeating?  1 - several days  Feeling bad about yourself - or that you are a failure or have let yourself or your family down? 0 - not at all  Trouble concentrating on things, such as reading the newspaper or watching television? 0 - not at all  Moving or speaking so slowly that other people could have noticed.  Or the opposite - being so fidgety or restless that you have been moving around a lot more than usual?  0 - not at all  Thoughts that you would be better off dead, or of hurting yourself?  0 - not at all  Patient Health Questionnaire Score:  5    If depressive symptoms identified deferred to follow up visit unless specifically addressed in assessment and plan.    Interpretation of PHQ-9 Total Score   Score Severity   1-4 No Depression   5-9 Mild Depression   10-14 Moderate Depression   15-19 Moderately Severe Depression   20-27 Severe Depression    Screening for Cognitive Impairment  Do you or any of your friends or family members have any concern about your memory? No  Three Minute Recall (Leader, Season, Table) 3/3    Robi clock face with all 12 numbers and set the hands to show 10 minutes after 11.  Yes 5/5  Cognitive concerns identified deferred  for follow up unless specifically addressed in assessment and plan.    Fall Risk Assessment  Has the patient had two or more falls in the last year or any fall with injury in the last year?  No    Safety Assessment  Do you always wear your seatbelt?  Yes  Any changes to home needed to function safely? No  Difficulty hearing.  Yes  Patient counseled about all safety risks that were identified.    Functional Assessment ADLs  Are there any barriers preventing you from cooking for yourself or meeting nutritional needs?  No.    Are there any barriers preventing you from driving safely or obtaining transportation?  No.    Are there any barriers preventing you from using a telephone or calling for help?  No    Are there any barriers preventing you from shopping?  No.    Are there any barriers preventing you from taking care of your own finances?  No    Are there any barriers preventing you from managing your medications?  No    Are there any barriers preventing you from showering, bathing or dressing yourself? No    Are there any barriers preventing you from doing housework or laundry? No    Are there any barriers preventing you from using the toilet?No    Are you currently engaging in any exercise or physical activity?  No.      Self-Assessment of Health  What is your perception of your health? Good    Do you sleep more than six hours a night? Yes    In the past 7 days, how much did pain keep you from doing your normal work? Some    Do you spend quality time with family or friends (virtually or in person)? Yes    Do you usually eat a heart healthy diet that constists of a variety of fruits, vegetables, whole grains and fiber? No    Do you eat foods high in fat and/or Fast Food more than three times per week? Yes    How concerned are you that your medical conditions are not being well managed? Not at all    Are you worried that in the next 2 months, you may not have stable housing that you own, rent, or stay in as part of  a household? No        Advance Care Planning  Do you have an Advance Directive, Living Will, Durable Power of , or POLST? No  Provided patient with educational brochure regarding Advance Care Planning.                   Health Maintenance Summary            Overdue - Hepatitis C Screening (Once) Never done      No completion history exists for this topic.              Overdue - Zoster (Shingles) Vaccines (2 of 3) Overdue since 1/24/2014 11/29/2013  Imm Admin: Zoster Vaccine Live (ZVL) (Zostavax) - HISTORICAL DATA              Ordered - Bone Density Scan (Every 5 Years) Ordered on 3/18/2024      12/03/2014  DS-BONE DENSITY STUDY (DEXA)    06/07/2006  DS-BONE DENSITY STUDY (DEXA)              Overdue - Pneumococcal Vaccine: 65+ Years (3 of 3 - PPSV23 or PCV20) Overdue since 12/9/2020 12/09/2015  Imm Admin: Pneumococcal Conjugate Vaccine (Prevnar/PCV-13)    01/14/2013  Imm Admin: Pneumococcal Conjugate Vaccine (Prevnar/PCV-13)    04/11/2011  Imm Admin: Pneumococcal polysaccharide vaccine (PPSV-23)    01/01/2011  Imm Admin: Pneumococcal polysaccharide vaccine (PPSV-23)              Annual Wellness Visit (Yearly) Next due on 3/18/2025      03/18/2024  Done - Comprehensive Health Assessment    03/18/2024  Level of Service: [place] ANNUAL WELLNESS VISIT-INCLUDES PPPS SUBSEQUE*    02/04/2022  Subsequent Annual Wellness Visit - Includes PPPS ()    02/04/2022  Visit Dx: Medicare annual wellness visit, subsequent    12/02/2021  Level of Service: [place] ANNUAL WELLNESS VISIT-INCLUDES PPPS SUBSEQUE*    Only the first 5 history entries have been loaded, but more history exists.              Scheduled - Mammogram (Every 2 Years) Scheduled for 3/21/2024      03/20/2023  MA-SCREENING MAMMO BILAT W/TOMOSYNTHESIS W/CAD    02/02/2018  MA-MAMMO DIAGNOSTIC UNILAT W/ROCHELLE W/CAD RIGHT    12/03/2014  MA-SCREENING MAMMOGRAM W/ CAD    11/18/2013  MA-SCREENING MAMMOGRAM W/ CAD    11/15/2012  MA-SCREENING MAMMOGRAM W/ CAD    Only  the first 5 history entries have been loaded, but more history exists.              Colorectal Cancer Screening (Colonoscopy - Every 5 Years) Next due on 4/13/2027 04/13/2022  COLONOSCOPY RESULTS              IMM DTaP/Tdap/Td Vaccine (2 - Td or Tdap) Next due on 6/15/2032      06/15/2022  Imm Admin: Tdap Vaccine              Influenza Vaccine (Series Information) Completed      09/22/2023  Imm Admin: Influenza Vaccine Adult HD    11/02/2022  Imm Admin: Influenza Vaccine Adult HD    10/16/2021  Imm Admin: Influenza Vaccine, Quadrivalent, Adjuvanted (Pf)    10/16/2020  Imm Admin: Influenza Vaccine Adult HD    10/10/2019  Imm Admin: Influenza Vaccine Adult HD    Only the first 5 history entries have been loaded, but more history exists.              COVID-19 Vaccine (Series Information) Completed      09/22/2023  Imm Admin: Comirnaty (Covid-19 Vaccine, Mrna, [id number] Formula)    11/02/2022  Imm Admin: MODERNA BIVALENT BOOSTER SARS-COV-2 VACCINE (6+)    05/16/2022  Imm Admin: MODERNA SARS-COV-2 VACCINE (12+)    01/06/2022  Imm Admin: MODERNA SARS-COV-2 VACCINE (12+)    04/10/2021  Imm Admin: MODERNA SARS-COV-2 VACCINE (12+)    Only the first 5 history entries have been loaded, but more history exists.              Hepatitis A Vaccine (Hep A) (Series Information) Aged Out      No completion history exists for this topic.              Hepatitis B Vaccine (Hep B) (Series Information) Aged Out      No completion history exists for this topic.              HPV Vaccines (Series Information) Aged Out      No completion history exists for this topic.              Polio Vaccine (Inactivated Polio) (Series Information) Aged Out      No completion history exists for this topic.              Meningococcal Immunization (Series Information) Aged Out      No completion history exists for this topic.                    Patient Care Team:  Zahra Paz M.D. as PCP - General (Geriatrics)    Financial Resource Strain:  "Low Risk  (3/18/2024)    Overall Financial Resource Strain (CARDIA)     Difficulty of Paying Living Expenses: Not very hard      Transportation Needs: No Transportation Needs (3/18/2024)    PRAPARE - Transportation     Lack of Transportation (Medical): No     Lack of Transportation (Non-Medical): No      Food Insecurity: No Food Insecurity (3/18/2024)    Hunger Vital Sign     Worried About Running Out of Food in the Last Year: Never true     Ran Out of Food in the Last Year: Never true        Encounter Vitals  Blood Pressure : 102/64  O2 Delivery Device: None - Room Air  Weight: 102 kg (224 lb)  Height: 157.5 cm (5' 2\")  BMI (Calculated): 40.97  Pain Score: 5=Moderate Pain  DME  O2 Delivery Device: None - Room Air     Alert, oriented in no acute distress.  Eye contact is good, speech goal directed, affect calm.    Assessment and Plan. The following treatment and monitoring plan is recommended:    Alcoholism in remission (HCC)  Stable. Reports that she heavily drank in her in 50's and has not had a drink in over 15 years.    Arthritis  Chronic, stable. Reports ongoing pain to her posterior neck. States that she was previously having some numbness and tingling down her right arm that has since improved since starting duloxetine. Reports use of aspirin, voltaren gel, and over-the-counter lidocaine patches as needed.    Cataracts, bilateral  Chronic, progressing. Reports that she has not had surgery, but has noticed more difficulty with reading. Followed by optometry, Dr. Morrison.    Current moderate episode of major depressive disorder without prior episode (HCC)  Chronic, stable. Currently taking sertraline 25 mg daily. Reports that mood is good. Denies SI/HI.    Essential hypertension  Chronic, stable. Currently taking amlodipine-olmesartan 5-40 mg daily hydrochlorothiazide 12.5 mg daily. In-office blood pressure is 102/64. Denies chest pain, lightheadedness, palpitations, and lower extremity edema. "     Hypothyroid  Chronic, stable. Currently taking levothyroxine as prescribed. Denies fatigue, palpitations, hair and skin changes, temperature intolerance, changes in bowel habits, and weight loss or weight gain.      Mixed hyperlipidemia  Chronic, stable. Currently taking lovastatin 20 mg daily. Reports that she is not exercising regularly. Provided [facility] gym resources. Denies chest pain, claudication, and dizziness.    Morbid obesity with BMI of 40.0-44.9, adult ([facility])  BMI is 40.97kg/m2. Provided education on heart healthy diet with adequate intake of fruits, vegetables, and whole grains. Encourage 30 minutes of moderate exercise 3-4 times a week.    Osteopenia  Chronic, stable. Reviewed DEXA scan from 2014. Encourage walking and weight-bearing activity. Denies recent falls or fractures.    Primary insomnia  Chronic, stable. Reports an average of 6 hours of sleep per night-- difficulty is staying asleep, not falling sleep. Denies use of over-the-counter sleep aids.     Encounter for osteoporosis screening in asymptomatic postmenopausal patient  DEXA scan ordered.      Services suggested: No services needed at this time  Health Care Screening: Age-appropriate preventive services recommended by USPTF and ACIP covered by Medicare were discussed today. Services ordered if indicated and agreed upon by the patient.  Referrals offered: Community-based lifestyle interventions to reduce health risks and promote self-management and wellness, fall prevention, nutrition, physical activity, tobacco-use cessation, weight loss, and mental health services as per orders if indicated.    Discussion today about general wellness and lifestyle habits:    Prevent falls and reduce trip hazards; Cautioned about securing or removing rugs.  Have a working fire alarm and carbon monoxide detector.  Engage in regular physical activity and social activities.    Follow-up: Return for appointment with Primary Care Provider as needed.

## 2024-03-18 NOTE — ASSESSMENT & PLAN NOTE
Chronic, stable. Reports ongoing pain to neck. States that she was previously having some numbness and tingling down her right arm that has improved with duloxetine. Reports use of aspirin, voltaren gel, and over-the-counter lidocaine patches as needed.

## 2024-04-12 ENCOUNTER — HOSPITAL ENCOUNTER (OUTPATIENT)
Dept: RADIOLOGY | Facility: MEDICAL CENTER | Age: 69
End: 2024-04-12
Attending: FAMILY MEDICINE
Payer: MEDICARE

## 2024-04-12 ENCOUNTER — HOSPITAL ENCOUNTER (OUTPATIENT)
Dept: RADIOLOGY | Facility: MEDICAL CENTER | Age: 69
End: 2024-04-12
Payer: MEDICARE

## 2024-04-12 DIAGNOSIS — Z12.31 VISIT FOR SCREENING MAMMOGRAM: ICD-10-CM

## 2024-04-12 DIAGNOSIS — Z13.820 ENCOUNTER FOR OSTEOPOROSIS SCREENING IN ASYMPTOMATIC POSTMENOPAUSAL PATIENT: ICD-10-CM

## 2024-04-12 DIAGNOSIS — Z78.0 ENCOUNTER FOR OSTEOPOROSIS SCREENING IN ASYMPTOMATIC POSTMENOPAUSAL PATIENT: ICD-10-CM

## 2024-04-12 PROCEDURE — 77080 DXA BONE DENSITY AXIAL: CPT

## 2024-04-12 PROCEDURE — 77067 SCR MAMMO BI INCL CAD: CPT

## 2024-05-01 DIAGNOSIS — M54.2 NECK PAIN: ICD-10-CM

## 2024-05-01 RX ORDER — GABAPENTIN 100 MG/1
100 CAPSULE ORAL 3 TIMES DAILY
Qty: 180 CAPSULE | Refills: 2 | Status: SHIPPED | OUTPATIENT
Start: 2024-05-01

## 2024-05-01 NOTE — TELEPHONE ENCOUNTER
Received request via: Pharmacy    Was the patient seen in the last year in this department? Yes    Does the patient have an active prescription (recently filled or refills available) for medication(s) requested? No    Pharmacy Name: [id number] - [place] - [address] AT [facility] [address]     Does the patient have detention Plus and need 100 day supply (blood pressure, diabetes and cholesterol meds only)? Medication is not for cholesterol, blood pressure or diabetes

## 2024-05-05 DIAGNOSIS — M54.2 NECK PAIN: ICD-10-CM

## 2024-05-05 DIAGNOSIS — F32.1 CURRENT MODERATE EPISODE OF MAJOR DEPRESSIVE DISORDER WITHOUT PRIOR EPISODE (HCC): ICD-10-CM

## 2024-05-06 RX ORDER — DULOXETIN HYDROCHLORIDE 30 MG/1
30 CAPSULE, DELAYED RELEASE ORAL DAILY
Qty: 90 CAPSULE | Refills: 2 | Status: SHIPPED | OUTPATIENT
Start: 2024-05-06

## 2024-06-10 ENCOUNTER — APPOINTMENT (OUTPATIENT)
Dept: MEDICAL GROUP | Facility: MEDICAL CENTER | Age: 69
End: 2024-06-10
Payer: MEDICARE

## 2024-06-12 ENCOUNTER — OFFICE VISIT (OUTPATIENT)
Dept: MEDICAL GROUP | Facility: MEDICAL CENTER | Age: 69
End: 2024-06-12
Payer: MEDICARE

## 2024-06-12 VITALS
SYSTOLIC BLOOD PRESSURE: 118 MMHG | HEART RATE: 120 BPM | WEIGHT: 229.6 LBS | BODY MASS INDEX: 42.25 KG/M2 | HEIGHT: 62 IN | OXYGEN SATURATION: 90 % | DIASTOLIC BLOOD PRESSURE: 82 MMHG | TEMPERATURE: 97.4 F

## 2024-06-12 DIAGNOSIS — I10 ESSENTIAL HYPERTENSION: ICD-10-CM

## 2024-06-12 DIAGNOSIS — E03.9 ACQUIRED HYPOTHYROIDISM: ICD-10-CM

## 2024-06-12 DIAGNOSIS — R00.0 TACHYCARDIA: ICD-10-CM

## 2024-06-12 DIAGNOSIS — E66.01 MORBID OBESITY (HCC): ICD-10-CM

## 2024-06-12 DIAGNOSIS — R94.31 ABNORMAL EKG: ICD-10-CM

## 2024-06-12 DIAGNOSIS — R53.83 FATIGUE, UNSPECIFIED TYPE: ICD-10-CM

## 2024-06-12 DIAGNOSIS — F32.1 CURRENT MODERATE EPISODE OF MAJOR DEPRESSIVE DISORDER WITHOUT PRIOR EPISODE (HCC): ICD-10-CM

## 2024-06-12 DIAGNOSIS — E78.2 MIXED HYPERLIPIDEMIA: ICD-10-CM

## 2024-06-12 DIAGNOSIS — R06.02 SOB (SHORTNESS OF BREATH) ON EXERTION: ICD-10-CM

## 2024-06-12 PROCEDURE — 3074F SYST BP LT 130 MM HG: CPT | Performed by: FAMILY MEDICINE

## 2024-06-12 PROCEDURE — 3079F DIAST BP 80-89 MM HG: CPT | Performed by: FAMILY MEDICINE

## 2024-06-12 PROCEDURE — 99214 OFFICE O/P EST MOD 30 MIN: CPT | Performed by: FAMILY MEDICINE

## 2024-06-12 NOTE — PROGRESS NOTES
CC: Shortness of breath    HPI:   Deloris presents today because she has been having shortness of breath with mild exertion.  She also has been having feeling tired all the time.  However she denies any leg swelling, or chest pain.  Patient with no history of heart issues, has never had any echocardiogram in the past.  Patient with history of hypothyroidism, hypertension, has been on levothyroxine 125 mcg Monday to Friday, and 137 mcg Sunday and Saturday.  Patient has also been on amlodipine-olmesartan 5-50 mg daily, and hydrochlorothiazide 12.5 mg daily.  Patient has not had blood work since 2020, she is not compliant with orders for blood work.  Patient BMI is increased to 41.9.        Patient Active Problem List    Diagnosis Date Noted    Alcoholism in remission ([facility]) 03/18/2024    Primary insomnia 03/18/2024    Arthritis 03/18/2024    Encounter for osteoporosis screening in asymptomatic postmenopausal patient 03/18/2024    Current moderate episode of major depressive disorder without prior episode ([facility]) 10/31/2023    Leukocytosis 12/02/2021    Morbid obesity with BMI of 40.0-44.9, adult ([facility]) 12/02/2021    Cataracts, bilateral 12/02/2021    Mixed hyperlipidemia 12/18/2018    Essential hypertension 12/18/2018    Osteopenia 12/18/2018    Hypothyroid 01/13/2018       Current Outpatient Medications   Medication Sig Dispense Refill    Zoster Vaccine Live (ZOSTAVAX) 73695 UNT/0.65ML Recon Susp       DULoxetine (CYMBALTA) 30 MG Cap DR Particles TAKE 1 CAPSULE BY MOUTH EVERY DAY 90 Capsule 2    gabapentin (NEURONTIN) 100 MG Cap TAKE 1 CAPSULE BY MOUTH THREE TIMES DAILY 180 Capsule 2    sertraline (ZOLOFT) 25 MG tablet TAKE 1 TABLET BY MOUTH EVERY DAY 30 Tablet 11    lovastatin (MEVACOR) 20 MG Tab TAKE 1 TABLET BY MOUTH EVERY  Tablet 2    Cholecalciferol (VITAMIN D3) 1.25 MG (22564 UT) Cap 1 tab daily-OTC      levothyroxine (SYNTHROID) 125 MCG Tab TAKE 1 TABLET BY MOUTH EVERY MORNING ON AN EMPTY STOMACH 90 Tablet 3  "   hydrochlorothiazide (MICROZIDE) 12.5 MG capsule Take 1 Capsule by mouth every day. 90 Capsule 2    amLODIPine-Olmesartan 5-40 MG Tab Take 1 Tablet by mouth every day. 100 Tablet 2    levothyroxine (SYNTHROID) 137 MCG Tab TAKE 1 TABLET BY MOUTH EVERY MORNING EVERY SATURDAY AND SUNDAY 90 Tablet 3     No current facility-administered medications for this visit.         Allergies as of 06/12/2024    (No Known Allergies)        ROS: Denies any chest pain, Shortness of breath, Changes bowel or bladder, Lower extremity edema.    Physical Exam:  /82   Pulse (!) 120   Temp 36.3 °C (97.4 °F) (Temporal)   Ht 1.575 m (5' 2\")   Wt 104 kg (229 lb 9.6 oz)   SpO2 90%   BMI 41.99 kg/m²   Gen.: Obese, no apparent distress,pleasant and cooperative with the examination  Skin:  Warm and dry with good turgor. No rashes or suspicious lesions in visible areas  HEENT:Sinuses nontender with palpation, TMs clear, nares patent with pink mucosa and clear rhinorrhea,no septal deviation ,polyps or lesions. lips without lesions, oropharynx clear.  Neck: Trachea midline,no masses or adenopathy. No JVD.  Cor: Regular rate and rhythm without murmur, gallop or rub.  Lungs: Respirations unlabored.Clear to auscultation with equal breath sounds bilaterally. No wheezes, rhonchi.  Extremities: No cyanosis, clubbing or edema.        Assessment and Plan.   68 y.o. female     1. SOB (shortness of breath) on exertion  2. Tachycardia  3. Fatigue, unspecified type  EKG showed sinus tachycardia: Premature ventricular contractions, bundle branch block, and prolonged QT  Will start patient on metoprolol 25 mg twice a day,  Please send urgent referral to cardiology for an evaluation.  However patient advised to go to ER if the symptoms gets worse.  Advised to do a complete blood work, return to the clinic in 2 weeks for reevaluation    - EKG - Clinic Performed  - EC-ECHOCARDIOGRAM COMPLETE W/ CONT; Future  - Referral to cardiology  - CBC WITH " DIFFERENTIAL; Future  - Comp Metabolic Panel; Future  - TSH+FREE T4  - Metoprolol 25 mg twice a day    4. Essential hypertension  Pressures controlled, however patient has increased heart rate.  Will add metoprolol 25 mg twice a day.  Continue on amlodipine-olmesartan 5-40 mg daily    - CBC WITH DIFFERENTIAL; Future  - Comp Metabolic Panel; Future  - Lipid Profile; Future  - - Metoprolol 25 mg twice a day    5. Acquired hypothyroidism  Currently on levothyroxine 125 mcg daily  Patient did not check her TSH for a while, will repeat TSH/free T4    - TSH+FREE T4    6. Morbid obesity (HCC)  BMI has worsened to 41.9  Patient is counseled on lifestyle patient    7. Mixed hyperlipidemia  She has been tolerating the statin. Denies muscle pain LFTs has been normal  Continue on lovastatin 20 mg daily    - Lipid Profile; Future      8. Abnormal EKG    - REFERRAL TO CARDIOLOGY    9. Current moderate episode of major depressive disorder without prior episode (HCC)  Mood has been fluctuating but mostly stable.  No suicidal ideation.  Continue on Cymbalta 30 mg daily

## 2024-06-17 ENCOUNTER — TELEPHONE (OUTPATIENT)
Dept: MEDICAL GROUP | Facility: MEDICAL CENTER | Age: 69
End: 2024-06-17
Payer: MEDICARE

## 2024-06-17 NOTE — TELEPHONE ENCOUNTER
VOICEMAIL  1. Caller Name: Deloris Brandt                        Call Back Number: [phone] (home) [phone] (work)      2. Message: pt was seen 6/12/24 and received an urgent referral to cardiology,  she states she cannot be seen until 8/28/24 and is asking if that is too long to wait.    3. Patient approves office to leave a detailed voicemail/MyChart message: N\A

## 2024-06-18 ENCOUNTER — APPOINTMENT (OUTPATIENT)
Dept: RADIOLOGY | Facility: MEDICAL CENTER | Age: 69
DRG: 286 | End: 2024-06-18
Attending: STUDENT IN AN ORGANIZED HEALTH CARE EDUCATION/TRAINING PROGRAM
Payer: MEDICARE

## 2024-06-18 ENCOUNTER — APPOINTMENT (OUTPATIENT)
Dept: CARDIOLOGY | Facility: MEDICAL CENTER | Age: 69
DRG: 286 | End: 2024-06-18
Attending: INTERNAL MEDICINE
Payer: MEDICARE

## 2024-06-18 ENCOUNTER — HOSPITAL ENCOUNTER (INPATIENT)
Facility: MEDICAL CENTER | Age: 69
LOS: 3 days | DRG: 286 | End: 2024-06-21
Attending: STUDENT IN AN ORGANIZED HEALTH CARE EDUCATION/TRAINING PROGRAM | Admitting: STUDENT IN AN ORGANIZED HEALTH CARE EDUCATION/TRAINING PROGRAM
Payer: MEDICARE

## 2024-06-18 DIAGNOSIS — J96.01 ACUTE HYPOXEMIC RESPIRATORY FAILURE (HCC): ICD-10-CM

## 2024-06-18 DIAGNOSIS — J96.01 ACUTE RESPIRATORY FAILURE WITH HYPOXIA (HCC): ICD-10-CM

## 2024-06-18 DIAGNOSIS — E03.4 HYPOTHYROIDISM DUE TO ACQUIRED ATROPHY OF THYROID: ICD-10-CM

## 2024-06-18 DIAGNOSIS — R79.89 ELEVATED BRAIN NATRIURETIC PEPTIDE (BNP) LEVEL: ICD-10-CM

## 2024-06-18 DIAGNOSIS — R06.02 SHORTNESS OF BREATH: ICD-10-CM

## 2024-06-18 DIAGNOSIS — I50.21 ACUTE SYSTOLIC (CONGESTIVE) HEART FAILURE (HCC): ICD-10-CM

## 2024-06-18 DIAGNOSIS — R93.89 ABNORMAL CHEST X-RAY: ICD-10-CM

## 2024-06-18 DIAGNOSIS — J90 BILATERAL PLEURAL EFFUSION: ICD-10-CM

## 2024-06-18 PROBLEM — R94.31 ABNORMAL EKG: Status: ACTIVE | Noted: 2024-06-18

## 2024-06-18 LAB
ALBUMIN SERPL BCP-MCNC: 3.9 G/DL (ref 3.2–4.9)
ALBUMIN/GLOB SERPL: 1.1 G/DL
ALP SERPL-CCNC: 135 U/L (ref 30–99)
ALT SERPL-CCNC: 29 U/L (ref 2–50)
ANION GAP SERPL CALC-SCNC: 15 MMOL/L (ref 7–16)
AST SERPL-CCNC: 26 U/L (ref 12–45)
BASOPHILS # BLD AUTO: 1.1 % (ref 0–1.8)
BASOPHILS # BLD: 0.11 K/UL (ref 0–0.12)
BILIRUB SERPL-MCNC: 0.3 MG/DL (ref 0.1–1.5)
BUN SERPL-MCNC: 12 MG/DL (ref 8–22)
CALCIUM ALBUM COR SERPL-MCNC: 9.6 MG/DL (ref 8.5–10.5)
CALCIUM SERPL-MCNC: 9.5 MG/DL (ref 8.5–10.5)
CHLORIDE SERPL-SCNC: 108 MMOL/L (ref 96–112)
CO2 SERPL-SCNC: 22 MMOL/L (ref 20–33)
CREAT SERPL-MCNC: 0.82 MG/DL (ref 0.5–1.4)
D DIMER PPP IA.FEU-MCNC: 0.83 UG/ML (FEU) (ref 0–0.5)
EKG IMPRESSION: NORMAL
EKG IMPRESSION: NORMAL
EOSINOPHIL # BLD AUTO: 0.41 K/UL (ref 0–0.51)
EOSINOPHIL NFR BLD: 4 % (ref 0–6.9)
ERYTHROCYTE [DISTWIDTH] IN BLOOD BY AUTOMATED COUNT: 46.1 FL (ref 35.9–50)
FLUAV RNA SPEC QL NAA+PROBE: NEGATIVE
FLUBV RNA SPEC QL NAA+PROBE: NEGATIVE
GFR SERPLBLD CREATININE-BSD FMLA CKD-EPI: 77 ML/MIN/1.73 M 2
GLOBULIN SER CALC-MCNC: 3.7 G/DL (ref 1.9–3.5)
GLUCOSE SERPL-MCNC: 112 MG/DL (ref 65–99)
HCT VFR BLD AUTO: 47.2 % (ref 37–47)
HGB BLD-MCNC: 14.4 G/DL (ref 12–16)
IMM GRANULOCYTES # BLD AUTO: 0.04 K/UL (ref 0–0.11)
IMM GRANULOCYTES NFR BLD AUTO: 0.4 % (ref 0–0.9)
LACTATE SERPL-SCNC: 0.7 MMOL/L (ref 0.5–2)
LV EJECT FRACT  99904: 25
LV EJECT FRACT MOD 2C 99903: 26.9
LV EJECT FRACT MOD 4C 99902: 24.38
LV EJECT FRACT MOD BP 99901: 25.68
LYMPHOCYTES # BLD AUTO: 2.08 K/UL (ref 1–4.8)
LYMPHOCYTES NFR BLD: 20.4 % (ref 22–41)
MCH RBC QN AUTO: 27.8 PG (ref 27–33)
MCHC RBC AUTO-ENTMCNC: 30.5 G/DL (ref 32.2–35.5)
MCV RBC AUTO: 91.1 FL (ref 81.4–97.8)
MONOCYTES # BLD AUTO: 0.72 K/UL (ref 0–0.85)
MONOCYTES NFR BLD AUTO: 7.1 % (ref 0–13.4)
NEUTROPHILS # BLD AUTO: 6.84 K/UL (ref 1.82–7.42)
NEUTROPHILS NFR BLD: 67 % (ref 44–72)
NRBC # BLD AUTO: 0 K/UL
NRBC BLD-RTO: 0 /100 WBC (ref 0–0.2)
NT-PROBNP SERPL IA-MCNC: 2692 PG/ML (ref 0–125)
PLATELET # BLD AUTO: 365 K/UL (ref 164–446)
PMV BLD AUTO: 10.6 FL (ref 9–12.9)
POTASSIUM SERPL-SCNC: 4.2 MMOL/L (ref 3.6–5.5)
PROCALCITONIN SERPL-MCNC: <0.05 NG/ML
PROT SERPL-MCNC: 7.6 G/DL (ref 6–8.2)
RBC # BLD AUTO: 5.18 M/UL (ref 4.2–5.4)
RSV RNA SPEC QL NAA+PROBE: NEGATIVE
SARS-COV-2 RNA RESP QL NAA+PROBE: NOTDETECTED
SODIUM SERPL-SCNC: 145 MMOL/L (ref 135–145)
TROPONIN T SERPL-MCNC: 15 NG/L (ref 6–19)
WBC # BLD AUTO: 10.2 K/UL (ref 4.8–10.8)

## 2024-06-18 PROCEDURE — 93306 TTE W/DOPPLER COMPLETE: CPT

## 2024-06-18 PROCEDURE — 80053 COMPREHEN METABOLIC PANEL: CPT

## 2024-06-18 PROCEDURE — 700102 HCHG RX REV CODE 250 W/ 637 OVERRIDE(OP): Performed by: INTERNAL MEDICINE

## 2024-06-18 PROCEDURE — 96375 TX/PRO/DX INJ NEW DRUG ADDON: CPT

## 2024-06-18 PROCEDURE — 99223 1ST HOSP IP/OBS HIGH 75: CPT | Mod: AI | Performed by: STUDENT IN AN ORGANIZED HEALTH CARE EDUCATION/TRAINING PROGRAM

## 2024-06-18 PROCEDURE — 87040 BLOOD CULTURE FOR BACTERIA: CPT | Mod: 91

## 2024-06-18 PROCEDURE — 700117 HCHG RX CONTRAST REV CODE 255: Performed by: STUDENT IN AN ORGANIZED HEALTH CARE EDUCATION/TRAINING PROGRAM

## 2024-06-18 PROCEDURE — 83880 ASSAY OF NATRIURETIC PEPTIDE: CPT

## 2024-06-18 PROCEDURE — 93306 TTE W/DOPPLER COMPLETE: CPT | Mod: 26 | Performed by: INTERNAL MEDICINE

## 2024-06-18 PROCEDURE — 700111 HCHG RX REV CODE 636 W/ 250 OVERRIDE (IP): Performed by: STUDENT IN AN ORGANIZED HEALTH CARE EDUCATION/TRAINING PROGRAM

## 2024-06-18 PROCEDURE — 96374 THER/PROPH/DIAG INJ IV PUSH: CPT

## 2024-06-18 PROCEDURE — 700102 HCHG RX REV CODE 250 W/ 637 OVERRIDE(OP): Performed by: STUDENT IN AN ORGANIZED HEALTH CARE EDUCATION/TRAINING PROGRAM

## 2024-06-18 PROCEDURE — 85379 FIBRIN DEGRADATION QUANT: CPT

## 2024-06-18 PROCEDURE — 71275 CT ANGIOGRAPHY CHEST: CPT

## 2024-06-18 PROCEDURE — 93005 ELECTROCARDIOGRAM TRACING: CPT

## 2024-06-18 PROCEDURE — 83605 ASSAY OF LACTIC ACID: CPT

## 2024-06-18 PROCEDURE — A9270 NON-COVERED ITEM OR SERVICE: HCPCS | Performed by: INTERNAL MEDICINE

## 2024-06-18 PROCEDURE — 93005 ELECTROCARDIOGRAM TRACING: CPT | Performed by: STUDENT IN AN ORGANIZED HEALTH CARE EDUCATION/TRAINING PROGRAM

## 2024-06-18 PROCEDURE — A9270 NON-COVERED ITEM OR SERVICE: HCPCS | Performed by: STUDENT IN AN ORGANIZED HEALTH CARE EDUCATION/TRAINING PROGRAM

## 2024-06-18 PROCEDURE — 0241U HCHG SARS-COV-2 COVID-19 NFCT DS RESP RNA 4 TRGT ED POC: CPT

## 2024-06-18 PROCEDURE — 84484 ASSAY OF TROPONIN QUANT: CPT

## 2024-06-18 PROCEDURE — 99285 EMERGENCY DEPT VISIT HI MDM: CPT

## 2024-06-18 PROCEDURE — 770020 HCHG ROOM/CARE - TELE (206)

## 2024-06-18 PROCEDURE — 85025 COMPLETE CBC W/AUTO DIFF WBC: CPT

## 2024-06-18 PROCEDURE — 84145 PROCALCITONIN (PCT): CPT

## 2024-06-18 PROCEDURE — 71045 X-RAY EXAM CHEST 1 VIEW: CPT

## 2024-06-18 PROCEDURE — 36415 COLL VENOUS BLD VENIPUNCTURE: CPT

## 2024-06-18 RX ORDER — AZITHROMYCIN 250 MG/1
500 TABLET, FILM COATED ORAL ONCE
Status: COMPLETED | OUTPATIENT
Start: 2024-06-18 | End: 2024-06-18

## 2024-06-18 RX ORDER — ASPIRIN 325 MG
325-650 TABLET ORAL
Status: ON HOLD | COMMUNITY
End: 2024-06-21

## 2024-06-18 RX ORDER — LOVASTATIN 20 MG/1
20 TABLET ORAL DAILY
Status: DISCONTINUED | OUTPATIENT
Start: 2024-06-18 | End: 2024-06-21 | Stop reason: HOSPADM

## 2024-06-18 RX ORDER — AMLODIPINE AND OLMESARTAN MEDOXOMIL 5; 40 MG/1; MG/1
1 TABLET ORAL
Status: DISCONTINUED | OUTPATIENT
Start: 2024-06-18 | End: 2024-06-18

## 2024-06-18 RX ORDER — FLUTICASONE PROPIONATE 50 MCG
1 SPRAY, SUSPENSION (ML) NASAL
COMMUNITY

## 2024-06-18 RX ORDER — AMLODIPINE BESYLATE 5 MG/1
5 TABLET ORAL EVERY EVENING
Status: DISCONTINUED | OUTPATIENT
Start: 2024-06-18 | End: 2024-06-19

## 2024-06-18 RX ORDER — OLMESARTAN MEDOXOMIL 20 MG/1
40 TABLET ORAL EVERY EVENING
Status: DISCONTINUED | OUTPATIENT
Start: 2024-06-18 | End: 2024-06-21 | Stop reason: HOSPADM

## 2024-06-18 RX ORDER — LEVOTHYROXINE SODIUM 0.12 MG/1
125 TABLET ORAL
Status: DISCONTINUED | OUTPATIENT
Start: 2024-06-18 | End: 2024-06-21 | Stop reason: HOSPADM

## 2024-06-18 RX ORDER — ENOXAPARIN SODIUM 100 MG/ML
40 INJECTION SUBCUTANEOUS DAILY
Status: DISCONTINUED | OUTPATIENT
Start: 2024-06-18 | End: 2024-06-21 | Stop reason: HOSPADM

## 2024-06-18 RX ORDER — FUROSEMIDE 10 MG/ML
40 INJECTION INTRAMUSCULAR; INTRAVENOUS
Status: DISCONTINUED | OUTPATIENT
Start: 2024-06-18 | End: 2024-06-20

## 2024-06-18 RX ORDER — GABAPENTIN 100 MG/1
100 CAPSULE ORAL 3 TIMES DAILY
Status: DISCONTINUED | OUTPATIENT
Start: 2024-06-18 | End: 2024-06-21 | Stop reason: HOSPADM

## 2024-06-18 RX ORDER — FUROSEMIDE 10 MG/ML
40 INJECTION INTRAMUSCULAR; INTRAVENOUS ONCE
Status: COMPLETED | OUTPATIENT
Start: 2024-06-18 | End: 2024-06-18

## 2024-06-18 RX ORDER — DULOXETIN HYDROCHLORIDE 30 MG/1
30 CAPSULE, DELAYED RELEASE ORAL DAILY
Status: DISCONTINUED | OUTPATIENT
Start: 2024-06-18 | End: 2024-06-21 | Stop reason: HOSPADM

## 2024-06-18 RX ORDER — CEFTRIAXONE 2 G/1
2000 INJECTION, POWDER, FOR SOLUTION INTRAMUSCULAR; INTRAVENOUS ONCE
Status: COMPLETED | OUTPATIENT
Start: 2024-06-18 | End: 2024-06-18

## 2024-06-18 RX ADMIN — ENOXAPARIN SODIUM 40 MG: 100 INJECTION SUBCUTANEOUS at 17:28

## 2024-06-18 RX ADMIN — LOVASTATIN 20 MG: 20 TABLET ORAL at 09:28

## 2024-06-18 RX ADMIN — METOPROLOL TARTRATE 25 MG: 25 TABLET, FILM COATED ORAL at 17:30

## 2024-06-18 RX ADMIN — CEFTRIAXONE SODIUM 2000 MG: 2 INJECTION, POWDER, FOR SOLUTION INTRAMUSCULAR; INTRAVENOUS at 06:47

## 2024-06-18 RX ADMIN — LEVOTHYROXINE SODIUM 125 MCG: 0.12 TABLET ORAL at 09:26

## 2024-06-18 RX ADMIN — OLMESARTAN MEDOXOMIL 40 MG: 20 TABLET, FILM COATED ORAL at 17:28

## 2024-06-18 RX ADMIN — SERTRALINE HYDROCHLORIDE 25 MG: 50 TABLET ORAL at 11:10

## 2024-06-18 RX ADMIN — GABAPENTIN 100 MG: 100 CAPSULE ORAL at 12:18

## 2024-06-18 RX ADMIN — FUROSEMIDE 40 MG: 10 INJECTION, SOLUTION INTRAMUSCULAR; INTRAVENOUS at 17:30

## 2024-06-18 RX ADMIN — AZITHROMYCIN DIHYDRATE 500 MG: 250 TABLET, FILM COATED ORAL at 06:47

## 2024-06-18 RX ADMIN — GABAPENTIN 100 MG: 100 CAPSULE ORAL at 09:27

## 2024-06-18 RX ADMIN — GABAPENTIN 100 MG: 100 CAPSULE ORAL at 17:28

## 2024-06-18 RX ADMIN — FUROSEMIDE 40 MG: 10 INJECTION, SOLUTION INTRAMUSCULAR; INTRAVENOUS at 06:47

## 2024-06-18 RX ADMIN — IOHEXOL 50 ML: 350 INJECTION, SOLUTION INTRAVENOUS at 06:00

## 2024-06-18 RX ADMIN — AMLODIPINE BESYLATE 5 MG: 5 TABLET ORAL at 17:28

## 2024-06-18 RX ADMIN — DULOXETINE HYDROCHLORIDE 30 MG: 30 CAPSULE, DELAYED RELEASE ORAL at 09:27

## 2024-06-18 RX ADMIN — METOPROLOL TARTRATE 25 MG: 25 TABLET, FILM COATED ORAL at 09:27

## 2024-06-18 SDOH — ECONOMIC STABILITY: TRANSPORTATION INSECURITY
IN THE PAST 12 MONTHS, HAS LACK OF RELIABLE TRANSPORTATION KEPT YOU FROM MEDICAL APPOINTMENTS, MEETINGS, WORK OR FROM GETTING THINGS NEEDED FOR DAILY LIVING?: NO

## 2024-06-18 ASSESSMENT — LIFESTYLE VARIABLES
TOTAL SCORE: 0
CONSUMPTION TOTAL: INCOMPLETE
HAVE YOU EVER FELT YOU SHOULD CUT DOWN ON YOUR DRINKING: NO
HAVE PEOPLE ANNOYED YOU BY CRITICIZING YOUR DRINKING: NO
DOES PATIENT WANT TO STOP DRINKING: CANNOT ASSESS
ALCOHOL_USE: NO
EVER FELT BAD OR GUILTY ABOUT YOUR DRINKING: NO
EVER HAD A DRINK FIRST THING IN THE MORNING TO STEADY YOUR NERVES TO GET RID OF A HANGOVER: NO

## 2024-06-18 ASSESSMENT — PATIENT HEALTH QUESTIONNAIRE - PHQ9
2. FEELING DOWN, DEPRESSED, IRRITABLE, OR HOPELESS: NOT AT ALL
1. LITTLE INTEREST OR PLEASURE IN DOING THINGS: NOT AT ALL
SUM OF ALL RESPONSES TO PHQ9 QUESTIONS 1 AND 2: 0

## 2024-06-18 ASSESSMENT — COGNITIVE AND FUNCTIONAL STATUS - GENERAL
SUGGESTED CMS G CODE MODIFIER MOBILITY: CK
DRESSING REGULAR LOWER BODY CLOTHING: A LITTLE
STANDING UP FROM CHAIR USING ARMS: A LITTLE
TURNING FROM BACK TO SIDE WHILE IN FLAT BAD: A LITTLE
MOBILITY SCORE: 18
CLIMB 3 TO 5 STEPS WITH RAILING: A LITTLE
MOVING FROM LYING ON BACK TO SITTING ON SIDE OF FLAT BED: A LITTLE
WALKING IN HOSPITAL ROOM: A LITTLE
DRESSING REGULAR UPPER BODY CLOTHING: A LITTLE
DAILY ACTIVITIY SCORE: 20
HELP NEEDED FOR BATHING: A LITTLE
TOILETING: A LITTLE
MOVING TO AND FROM BED TO CHAIR: A LITTLE
SUGGESTED CMS G CODE MODIFIER DAILY ACTIVITY: CJ

## 2024-06-18 ASSESSMENT — ENCOUNTER SYMPTOMS
ORTHOPNEA: 1
PALPITATIONS: 0
NAUSEA: 0
SORE THROAT: 0
FEVER: 0
SHORTNESS OF BREATH: 1
COUGH: 0
CHILLS: 0
NECK PAIN: 0
BLURRED VISION: 0
DOUBLE VISION: 0
HEADACHES: 0
MYALGIAS: 0
DIZZINESS: 0
HEARTBURN: 0

## 2024-06-18 ASSESSMENT — FIBROSIS 4 INDEX
FIB4 SCORE: 0.93
FIB4 SCORE: 0.93

## 2024-06-18 ASSESSMENT — SOCIAL DETERMINANTS OF HEALTH (SDOH)
WITHIN THE PAST 12 MONTHS, YOU WORRIED THAT YOUR FOOD WOULD RUN OUT BEFORE YOU GOT THE MONEY TO BUY MORE: NEVER TRUE
WITHIN THE LAST YEAR, HAVE TO BEEN RAPED OR FORCED TO HAVE ANY KIND OF SEXUAL ACTIVITY BY YOUR PARTNER OR EX-PARTNER?: NO
WITHIN THE LAST YEAR, HAVE YOU BEEN AFRAID OF YOUR PARTNER OR EX-PARTNER?: NO
WITHIN THE PAST 12 MONTHS, THE FOOD YOU BOUGHT JUST DIDN'T LAST AND YOU DIDN'T HAVE MONEY TO GET MORE: NEVER TRUE
IN THE PAST 12 MONTHS, HAS THE ELECTRIC, GAS, OIL, OR WATER COMPANY THREATENED TO SHUT OFF SERVICE IN YOUR HOME?: NO
WITHIN THE LAST YEAR, HAVE YOU BEEN HUMILIATED OR EMOTIONALLY ABUSED IN OTHER WAYS BY YOUR PARTNER OR EX-PARTNER?: NO
WITHIN THE LAST YEAR, HAVE YOU BEEN KICKED, HIT, SLAPPED, OR OTHERWISE PHYSICALLY HURT BY YOUR PARTNER OR EX-PARTNER?: NO

## 2024-06-18 ASSESSMENT — PAIN DESCRIPTION - PAIN TYPE
TYPE: ACUTE PAIN
TYPE: ACUTE PAIN

## 2024-06-18 NOTE — CARE PLAN
Problem: Knowledge Deficit - Standard  Goal: Patient and family/care givers will demonstrate understanding of plan of care, disease process/condition, diagnostic tests and medications  Outcome: Progressing     Problem: Fall Risk  Goal: Patient will remain free from falls  Outcome: Progressing     Problem: Psychosocial  Goal: Patient's level of anxiety will decrease  Outcome: Progressing  Goal: Patient's ability to verbalize feelings about condition will improve  Outcome: Progressing  Goal: Patient's ability to re-evaluate and adapt role responsibilities will improve  Outcome: Progressing  Goal: Patient and family will demonstrate ability to cope with life altering diagnosis and/or procedure  Outcome: Progressing  Goal: Spiritual and cultural needs incorporated into hospitalization  Outcome: Progressing     Problem: Communication  Goal: The ability to communicate needs accurately and effectively will improve  Outcome: Progressing     Problem: Discharge Barriers/Planning  Goal: Patient's continuum of care needs are met  Outcome: Progressing     Problem: Hemodynamics  Goal: Patient's hemodynamics, fluid balance and neurologic status will be stable or improve  Outcome: Progressing     Problem: Respiratory  Goal: Patient will achieve/maintain optimum respiratory ventilation and gas exchange  Outcome: Progressing     Problem: Fluid Volume  Goal: Fluid volume balance will be maintained  Outcome: Progressing     Problem: Risk for Aspiration  Goal: Patient's risk for aspiration will be absent or decrease  Outcome: Progressing     Problem: Nutrition  Goal: Patient's nutritional and fluid intake will be adequate or improve  Outcome: Progressing  Goal: Enteral nutrition will be maintained or improve  Outcome: Progressing     Problem: Urinary Elimination  Goal: Establish and maintain regular urinary output  Outcome: Progressing     Problem: Bowel Elimination  Goal: Establish and maintain regular bowel function  Outcome:  Progressing     Problem: Gastrointestinal Irritability  Goal: Nausea and vomiting will be absent or improve  Outcome: Progressing  Goal: Diarrhea will be absent or improved  Outcome: Progressing     Problem: Mobility  Goal: Patient's capacity to carry out activities will improve  Outcome: Progressing     Problem: Self Care  Goal: Patient will have the ability to perform ADLs independently or with assistance (bathe, groom, dress, toilet and feed)  Outcome: Progressing     Problem: Infection - Standard  Goal: Patient will remain free from infection  Outcome: Progressing   The patient is Watcher - Medium risk of patient condition declining or worsening    Shift Goals  Clinical Goals: VSS, safety, comfort  Patient Goals: Rest  Family Goals: Updates    Progress made toward(s) clinical / shift goals:  VSS, safety, comfort    Patient is not progressing towards the following goals:

## 2024-06-18 NOTE — ED PROVIDER NOTES
CHIEF COMPLAINT  Chief Complaint   Patient presents with    Shortness of Breath     Pt reports shortness of breath at home that has increased over the last week. Pt also reports a cough with chest pressure x1 week.  Pt was 88% RA in triage, placed on 2L NC.        LIMITATION TO HISTORY   Select:     HPI    Deloris Brandt is a 68 y.o. female who presents to the Emergency Department for shortness of breath ongoing for the last 3 days and a slight nonproductive cough for the last 3 days patient denies swelling in her legs she denies fevers or chills noted to have a room air oxygen saturation in the mid 80s placed on 3 L/min nasal cannula patient reports her shortness of breath is improved    OUTSIDE HISTORIAN(S):  Select:    EXTERNAL RECORDS REVIEWED  Select:       PAST MEDICAL HISTORY  Past Medical History:   Diagnosis Date    Hyperglycemia     Hyperlipidemia     Hypertension     Hypokalemia     Hypothyroid      .    SURGICAL HISTORY  Past Surgical History:   Procedure Laterality Date    OTHER ABDOMINAL SURGERY  01/01/2001    Gastric bypass    APPENDECTOMY  48 years ago    OTHER      tonsilectomy         FAMILY HISTORY  Family History   Problem Relation Age of Onset    Cancer Mother     Diabetes Father           SOCIAL HISTORY  Social History     Socioeconomic History    Marital status: Single     Spouse name: Not on file    Number of children: Not on file    Years of education: Not on file    Highest education level: Not on file   Occupational History    Not on file   Tobacco Use    Smoking status: Never    Smokeless tobacco: Never   Vaping Use    Vaping status: Never Used   Substance and Sexual Activity    Alcohol use: No    Drug use: No    Sexual activity: Not Currently   Other Topics Concern     Service Not Asked    Blood Transfusions Not Asked    Caffeine Concern Not Asked    Occupational Exposure Not Asked    Hobby Hazards Not Asked    Sleep Concern Not Asked    Stress Concern Not Asked    Weight Concern  Not Asked    Special Diet Not Asked    Back Care Not Asked    Exercise Not Asked    Bike Helmet Not Asked    Seat Belt Not Asked    Self-Exams Not Asked   Social History Narrative    Not on file     Social Determinants of Health     Financial Resource Strain: Low Risk  (3/18/2024)    Overall Financial Resource Strain (CARDIA)     Difficulty of Paying Living Expenses: Not very hard   Food Insecurity: No Food Insecurity (3/18/2024)    Hunger Vital Sign     Worried About Running Out of Food in the Last Year: Never true     Ran Out of Food in the Last Year: Never true   Transportation Needs: No Transportation Needs (3/18/2024)    PRAPARE - Transportation     Lack of Transportation (Medical): No     Lack of Transportation (Non-Medical): No   Physical Activity: Not on file   Stress: Not on file   Social Connections: Not on file   Intimate Partner Violence: Not on file   Housing Stability: Low Risk  (3/18/2024)    Housing Stability Vital Sign     Unable to Pay for Housing in the Last Year: No     Number of Places Lived in the Last Year: 1     Unstable Housing in the Last Year: No         CURRENT MEDICATIONS  No current facility-administered medications on file prior to encounter.     Current Outpatient Medications on File Prior to Encounter   Medication Sig Dispense Refill    Zoster Vaccine Live (ZOSTAVAX) 31355 UNT/0.65ML Recon Susp       metoprolol tartrate (LOPRESSOR) 25 MG Tab Take 1 Tablet by mouth 2 times a day. 60 Tablet 2    DULoxetine (CYMBALTA) 30 MG Cap DR Particles TAKE 1 CAPSULE BY MOUTH EVERY DAY 90 Capsule 2    gabapentin (NEURONTIN) 100 MG Cap TAKE 1 CAPSULE BY MOUTH THREE TIMES DAILY 180 Capsule 2    sertraline (ZOLOFT) 25 MG tablet TAKE 1 TABLET BY MOUTH EVERY DAY 30 Tablet 11    lovastatin (MEVACOR) 20 MG Tab TAKE 1 TABLET BY MOUTH EVERY  Tablet 2    Cholecalciferol (VITAMIN D3) 1.25 MG (07913 UT) Cap 1 tab daily-OTC      levothyroxine (SYNTHROID) 125 MCG Tab TAKE 1 TABLET BY MOUTH EVERY MORNING  "ON AN EMPTY STOMACH 90 Tablet 3    hydrochlorothiazide (MICROZIDE) 12.5 MG capsule Take 1 Capsule by mouth every day. 90 Capsule 2    amLODIPine-Olmesartan 5-40 MG Tab Take 1 Tablet by mouth every day. 100 Tablet 2    levothyroxine (SYNTHROID) 137 MCG Tab TAKE 1 TABLET BY MOUTH EVERY MORNING EVERY SATURDAY AND SUNDAY 90 Tablet 3           ALLERGIES  No Known Allergies    PHYSICAL EXAM  VITAL SIGNS:/79   Pulse 82   Temp 36.1 °C (97 °F) (Temporal)   Resp 16   Ht 1.575 m (5' 2\")   Wt 102 kg (225 lb)   SpO2 92%   BMI 41.15 kg/m²       GENERAL: Awake and alert  HEAD: Normocephalic and atraumatic  NECK: Normal range of motion, without meningismus  EYES: Pupils Equal, Round, Reactive to Light, extraocular movements intact, conjunctiva white  ENT: Mucous membranes moist, oropharynx clear  PULMONARY: Increased effort tachypneic clear to auscultation  CARDIOVASCULAR: Tachycardic no murmurs, clicks or rubs, peripheral pulses 2+  ABDOMINAL: Soft, non-tender, no guarding or rigidity present, no pulsatile masses  BACK: no midline tenderness, no costovertebral tenderness  NEUROLOGICAL: Grossly non-focal neurological examination, speech normal, gait normal  EXTREMITIES: No edema, normal to inspection  SKIN: Warm and dry.  PSYCHIATRIC: Affect is appropriate    DIAGNOSTIC STUDIES / PROCEDURES  EKG    CRITICAL CARE  The very real possibilty of a deterioration of this patient's condition required the highest level of my preparedness for sudden, emergent intervention.  I provided critical care services, which included medication orders, frequent reevaluations of the patient's condition and response to treatment, ordering and reviewing test results, and discussing the case with various consultants.  The critical care time associated with the care of the patient was 33 minutes. Review chart for interventions. This time is exclusive of any other billable procedures.    EKG Interpretation: I independently reviewed the below EKG " and did not see signs of a STEMI  Results for orders placed or performed during the hospital encounter of 24   EKG (NOW)   Result Value Ref Range    Report       [facility] Emergency Dept.    Test Date:  2024  Pt Name:    THANH PIERRE                  Department: ER  MRN:        [id number]                      Room:  Gender:     Female                       Technician: [id number]  :        [id number]                   Requested By:ER TRIAGE PROTOCOL  Order #:    [id number]                    Reading MD:    Measurements  Intervals                                Axis  Rate:       94                           P:          0  MN:         0                            QRS:        11  QRSD:       138                          T:          28  QT:         437  QTc:        547    Interpretive Statements  Atrial fibrillation  Left bundle branch block  Compared to ECG 2018 13:13:56  Left bundle-branch block now present  Sinus tachycardia no longer present  Myocardial infarct finding no longer present           LABS  Labs Reviewed   CBC WITH DIFFERENTIAL - Abnormal; Notable for the following components:       Result Value    Hematocrit 47.2 (*)     MCHC 30.5 (*)     Lymphocytes 20.40 (*)     All other components within normal limits   COMP METABOLIC PANEL - Abnormal; Notable for the following components:    Glucose 112 (*)     Alkaline Phosphatase 135 (*)     Globulin 3.7 (*)     All other components within normal limits   D-DIMER - Abnormal; Notable for the following components:    D-Dimer 0.83 (*)     All other components within normal limits   PROBRAIN NATRIURETIC PEPTIDE, NT - Abnormal; Notable for the following components:    NT-proBNP 2692 (*)     All other components within normal limits   LACTIC ACID   TROPONIN   ESTIMATED GFR   BLOOD CULTURE   BLOOD CULTURE   PROCALCITONIN   POCT COV-2, FLU A/B, RSV BY PCR   POC COV-2, FLU A/B, RSV BY PCR         RADIOLOGY  I have independently interpreted  the diagnostic imaging associated with this visit and am waiting the final reading from the radiologist.   My preliminary interpretation is as follows: Evidence of infiltrate    COURSE & MEDICAL DECISION MAKING    ED COURSE:        INTERVENTIONS BY ME:  Medications   furosemide (Lasix) injection 40 mg (has no administration in time range)   azithromycin (Zithromax) tablet 500 mg (has no administration in time range)   cefTRIAXone (Rocephin) injection 2,000 mg (has no administration in time range)   amLODIPine-Olmesartan 5-40 MG TABS 1 Tablet (has no administration in time range)   DULoxetine (Cymbalta) capsule 30 mg (has no administration in time range)   gabapentin (Neurontin) capsule 100 mg (has no administration in time range)   levothyroxine (Synthroid) tablet 125 mcg (has no administration in time range)   lovastatin (Mevacor) tablet 20 mg (has no administration in time range)   metoprolol tartrate (Lopressor) tablet 25 mg (has no administration in time range)   sertraline (Zoloft) tablet 25 mg (has no administration in time range)   enoxaparin (Lovenox) inj 40 mg (has no administration in time range)   furosemide (Lasix) injection 40 mg (has no administration in time range)   iohexol (OMNIPAQUE) 350 mg/mL (IV) (50 mL Intravenous Given 6/18/24 0600)       Response on recheck:  4:15 AM patient reassessed vital signs improved she still slightly tachypneic reports her shortness of breath is improved blood pressure appropriate  5:20 AM patient reassessed she is on 3 L/min nasal cannula maintain her oxygen saturations no increased work of breathing do not feel noninvasive positive pressure ventilation is indicated at this time  6:30 AM patient CT scan notable for infiltrates and bilateral pleural effusions this time do not feel patient has indication for emergent thoracentesis    INITIAL ASSESSMENT, COURSE AND PLAN  Care Narrative:    I do feel patient's hypoxemic respiratory failure and respiratory distress poses  probability of clinically significant, life threatening deterioration and required my intervention to to assess and manage the high probability of imminent, life-threatening deterioration that could result in multi-organ failure.      Patient presenting dyspneic moderately hypoxic in respiratory distress placed on nasal cannula her respiratory distress and hypoxia improved with 2 and then 3 L/min of nasal cannula her work of breathing is appropriate do not feel patient needs noninvasive positive pressure ventilation.    Workup notable for chest radiograph with pulmonary edema versus infiltrates given her elevated D-dimer CT angiogram was obtained which is without evidence of pulmonary embolism but is notable for pulmonary infiltrates and moderate pleural effusions patient provided furosemide and medication for commune acquired pneumonia patient without leukocytosis or evidence of sepsis patient CMP is reassuring troponin EKG not consistent with any acute coronary syndrome BNP is quite elevated at 2600           ADDITIONAL PROBLEM LIST    DISPOSITION AND DISCUSSIONS  Discussion of management with other [facility] or appropriate source(s): None     I have discussed management of the patient with the following physicians and YAQUELIN's: I disccused the mangament and decision to admit this patient with the Hospitalist. Dr. Jones in guarded condition    FINAL DIAGNOSIS  1. Acute hypoxemic respiratory failure (HCC)    2. Elevated brain natriuretic peptide (BNP) level    3. Shortness of breath    4. Abnormal chest x-ray    5. Bilateral pleural effusion             Electronically signed by: William Weems DO ,6:43 AM 06/18/24

## 2024-06-18 NOTE — DIETARY
NUTRITION SERVICES:   BMI - Pt with BMI >40 (=Body mass index is 41.53 kg/m².), Class III obesity. Weight loss counseling not appropriate in acute care setting.    RECOMMEND - If appropriate at DC please refer to outpatient nutrition services for weight management.

## 2024-06-18 NOTE — ED NOTES
Med rec complete per patient  Allergies reviewed.   Outpatient antibiotics in the last 30 days? No   Anticoagulants taken in the last 14 days? No    Pharmacy patient utilizes: [facility] on [address] and [address]

## 2024-06-18 NOTE — ED NOTES
Bedside report received from off going RN, assumed care of patient.  POC discussed with patient. Call light within reach, all needs addressed at this time.       Fall risk interventions in place: Keep floor surfaces clean and dry (all applicable per [place] Fall risk assessment)   Continuous monitoring: Cardiac Leads, Pulse Ox, or Blood Pressure  IVF/IV medications: Not Applicable   Oxygen: How many liters 4L  Bedside sitter: Not Applicable   Isolation: Not Applicable

## 2024-06-18 NOTE — H&P
Hospital Medicine History & Physical Note    Date of Service  6/18/2024    Primary Care Physician  Zahra Paz M.D.    Code Status  Full Code    Chief Complaint  Chief Complaint   Patient presents with    Shortness of Breath     Pt reports shortness of breath at home that has increased over the last week. Pt also reports a cough with chest pressure x1 week.  Pt was 88% RA in triage, placed on 2L NC.        History of Presenting Illness  Deloris Brandt is a 68 y.o. female who presented 6/18/2024 with very pleasant 68-year-old woman with history of hyperlipidemia, hypertension, hypothyroidism.  She presents due to shortness of breath.  Over the past 2 weeks she has had episodes of feeling very short of breath.  She has had orthopnea and has been awoken with shortness of breath several times.  She has also noticed dyspnea on exertion and decreased tolerance of ambulation/exertion.  Episodes of increased much more over the past few days prompting her to come into the ED.  Chest x-ray she was found to have pulmonary edema.  Troponin nonelevated but BNP elevated at 2692.  Had mildly elevated D-dimer at 0.83, CTA pending at time of admission, viral panel negative.  Was given ceftriaxone and azithromycin in ED.  She was requiring additional oxygen due to SpO2 of 86% on arrival.    I discussed the plan of care with patient.    Review of Systems  Review of Systems   Constitutional:  Negative for chills and fever.   HENT:  Negative for congestion and sore throat.    Eyes:  Negative for blurred vision and double vision.   Respiratory:  Positive for shortness of breath. Negative for cough.    Cardiovascular:  Positive for orthopnea. Negative for chest pain and palpitations.   Gastrointestinal:  Negative for heartburn and nausea.   Genitourinary:  Negative for dysuria and urgency.   Musculoskeletal:  Negative for myalgias and neck pain.   Neurological:  Negative for dizziness and headaches.       Past Medical  History   has a past medical history of Hyperglycemia, Hyperlipidemia, Hypertension, Hypokalemia, and Hypothyroid.    Surgical History   has a past surgical history that includes other abdominal surgery (2001); appendectomy (48 years ago); and other.     Family History  family history includes Cancer in her mother; Diabetes in her father.   Family history reviewed with patient. There is no family history that is pertinent to the chief complaint.     Social History   reports that she has never smoked. She has never used smokeless tobacco. She reports that she does not drink alcohol and does not use drugs.    Allergies  No Known Allergies    Medications  Prior to Admission Medications   Prescriptions Last Dose Informant Patient Reported? Taking?   Cholecalciferol (VITAMIN D3) 1.25 MG (30671 UT) Cap   Yes No   Si tab daily-OTC   DULoxetine (CYMBALTA) 30 MG Cap DR Particles   No No   Sig: TAKE 1 CAPSULE BY MOUTH EVERY DAY   Zoster Vaccine Live (ZOSTAVAX) 34743 UNT/0.65ML Recon Susp   Yes No   amLODIPine-Olmesartan 5-40 MG Tab   No No   Sig: Take 1 Tablet by mouth every day.   gabapentin (NEURONTIN) 100 MG Cap   No No   Sig: TAKE 1 CAPSULE BY MOUTH THREE TIMES DAILY   hydrochlorothiazide (MICROZIDE) 12.5 MG capsule   No No   Sig: Take 1 Capsule by mouth every day.   levothyroxine (SYNTHROID) 125 MCG Tab   No No   Sig: TAKE 1 TABLET BY MOUTH EVERY MORNING ON AN EMPTY STOMACH   levothyroxine (SYNTHROID) 137 MCG Tab   No No   Sig: TAKE 1 TABLET BY MOUTH EVERY MORNING EVERY SATURDAY AND    lovastatin (MEVACOR) 20 MG Tab   No No   Sig: TAKE 1 TABLET BY MOUTH EVERY DAY   metoprolol tartrate (LOPRESSOR) 25 MG Tab   No No   Sig: Take 1 Tablet by mouth 2 times a day.   sertraline (ZOLOFT) 25 MG tablet   No No   Sig: TAKE 1 TABLET BY MOUTH EVERY DAY      Facility-Administered Medications: None       Physical Exam  Temp:  [36.1 °C (97 °F)] 36.1 °C (97 °F)  Pulse:  [] 82  Resp:  [16-30] 16  BP:  (131-136)/(79-91) 131/79  SpO2:  [86 %-94 %] 92 %  Blood Pressure : 131/79   Temperature: 36.1 °C (97 °F)   Pulse: 82   Respiration: 16   Pulse Oximetry: 92 %       Physical Exam  Constitutional:       General: She is not in acute distress.     Appearance: She is not toxic-appearing.   HENT:      Head: Normocephalic and atraumatic.      Nose: Nose normal.      Mouth/Throat:      Mouth: Mucous membranes are moist.      Pharynx: Oropharynx is clear.   Eyes:      Extraocular Movements: Extraocular movements intact.      Conjunctiva/sclera: Conjunctivae normal.   Cardiovascular:      Rate and Rhythm: Normal rate and regular rhythm.      Pulses: Normal pulses.      Heart sounds: Normal heart sounds.   Pulmonary:      Effort: Pulmonary effort is normal.      Breath sounds: Rales present.   Abdominal:      Palpations: Abdomen is soft.      Tenderness: There is no abdominal tenderness.   Musculoskeletal:         General: No swelling or deformity.      Cervical back: Neck supple. No rigidity.   Skin:     General: Skin is warm and dry.   Neurological:      General: No focal deficit present.      Mental Status: She is oriented to person, place, and time.         Laboratory:  Recent Labs     06/18/24  0451   WBC 10.2   RBC 5.18   HEMOGLOBIN 14.4   HEMATOCRIT 47.2*   MCV 91.1   MCH 27.8   MCHC 30.5*   RDW 46.1   PLATELETCT 365   MPV 10.6     Recent Labs     06/18/24  0451   SODIUM 145   POTASSIUM 4.2   CHLORIDE 108   CO2 22   GLUCOSE 112*   BUN 12   CREATININE 0.82   CALCIUM 9.5     Recent Labs     06/18/24  0451   ALTSGPT 29   ASTSGOT 26   ALKPHOSPHAT 135*   TBILIRUBIN 0.3   GLUCOSE 112*         Recent Labs     06/18/24  0451   NTPROBNP 2692*         Recent Labs     06/18/24  0451   TROPONINT 15       Imaging:  CT-CTA CHEST PULMONARY ARTERY W/ RECONS   Final Result         1.  No pulmonary embolus appreciated.   2.  Moderate layering bilateral pleural effusions.   3.  Pulmonary infiltrates, greatest in the upper lobes   4.   Perihilar consolidations, appearance favoring perihilar infiltrates   5.  Small hiatal hernia   6.  Irregular hepatic contour, appearance favoring changes of cirrhosis   7.  Atherosclerosis and atherosclerotic coronary artery disease.      DX-CHEST-PORTABLE (1 VIEW)   Final Result         1.  Pulmonary edema and/or infiltrates   2.  Cardiomegaly          EKG:  I have personally reviewed the images and compared with prior images. and My impression is: Sinus rhythm with rate of 94, QTc 547, machine read as A-fib however I cannot appreciate P waves, no ST elevations or depressions    Assessment/Plan:  Justification for Admission Status  I anticipate this patient will require at least two midnights for appropriate medical management, necessitating inpatient admission because respiratory failure, possible new onset heart failure    Patient will need a Telemetry bed on MEDICAL service .  The need is secondary to above.    * Acute hypoxemic respiratory failure (HCC)- (present on admission)  Assessment & Plan  Increasing shortness of breath over the last 2 weeks with dyspnea on exertion and orthopnea  Pulmonary edema on chest x-ray  Elevated BNP  Suspect likely heart failure as etiology  Ordered echocardiogram  Diuresis  Possible pneumonia but no cough and more subacute presentation, was given ceftriaxone and azithromycin, checking procalcitonin and holding off on further antibiotics at this time, continue to reevaluate need for antibiotics.  Watch on telemetry  Wean from oxygen as tolerated  Optimize electrolytes    Abnormal EKG  Assessment & Plan  EKG read as atrial fibrillation which patient does not have existing diagnosis of, I am able to appreciate P waves, will watch on telemetry and repeat EKG to more definitively rule out A-fib  Optimize electrolytes    Essential hypertension- (present on admission)  Assessment & Plan  Continue amlodipine    Mixed hyperlipidemia- (present on admission)  Assessment & Plan  Continue  lovastatin    Hypothyroid- (present on admission)  Assessment & Plan  Continue levothyroxine        VTE prophylaxis: SCDs/TEDs and enoxaparin ppx

## 2024-06-18 NOTE — PROGRESS NOTES
Accepted patient admitted by my colleague.    68-year-old female with class III obesity with a BMI of 42 and underlying hypertension, hyperlipidemia and strong family history of heart issues who presents to the hospital with progressive dyspnea on exertion, orthopnea and shortness of breath.  Upon admission to the hospital she was found to have acute hypoxic respiratory failure in the setting of what appears to be new onset heart failure, unknown LVEF.    She was admitted to the hospital placed on empiric IV diuretics and echocardiogram was ordered.  CTA of the chest showed no evidence of pulmonary embolism, but did show bilateral moderate pleural effusions.    Patient received IV Lasix in the ER with robust urine response and already has symptomatic improvement.  Her lower extremities show no significant edema except for the right lower extremity which per the patient is at her baseline and is base symmetrical for many decades.  She has decreased breath sounds from the mid lung fields distally.    Plan:  -Follow-up echocardiogram, high suspicion for underlying diastolic or systolic congestive heart failure  -Depending on the above results may need expedited ischemic workup and cardiology consultation  -Further goal directed medical therapy will be dependent upon echocardiogram  -Continue IV diuretics  -Consider thoracentesis

## 2024-06-18 NOTE — PROGRESS NOTES
4 Eyes Skin Assessment Completed by ROVERTO Guzman and ROVERTO Brasher.    Head WDL  Ears WDL  Nose WDL  Mouth WDL  Neck WDL  Breast/Chest WDL  Shoulder Blades Redness and Blanching  Spine WDL  (R) Arm/Elbow/Hand WDL  (L) Arm/Elbow/Hand WDL  Abdomen WDL  Groin WDL  Scrotum/Coccyx/Buttocks WDL  (R) Leg Swelling  (L) Leg Swelling  (R) Heel/Foot/Toe WDL  (L) Heel/Foot/Toe WDL          Devices In Places Tele Box, Blood Pressure Cuff, Pulse Ox, and Nasal Cannula      Interventions In Place Gray Ear Foams and Pillows    Possible Skin Injury No    Pictures Uploaded Into Epic N/A  Wound Consult Placed N/A  RN Wound Prevention Protocol Ordered Yes

## 2024-06-18 NOTE — ED TRIAGE NOTES
"Chief Complaint   Patient presents with    Shortness of Breath     Pt reports shortness of breath at home that has increased over the last week. Pt also reports a cough with chest pressure x1 week.  Pt was 88% RA in triage, placed on 2L NC.        Pt is alert and oriented, speaking in full sentences, follows commands and responds appropriately to questions. Resperations are even and unlabored.      Pt placed in lobby. Pt educated on triage process. Pt encouraged to alert staff for any changes.       /79   Pulse (!) 109   Temp 36.1 °C (97 °F) (Temporal)   Resp 20   Ht 1.575 m (5' 2\")   Wt 102 kg (225 lb)   SpO2 94%      "

## 2024-06-18 NOTE — ASSESSMENT & PLAN NOTE
EKG read as atrial fibrillation which patient does not have existing diagnosis of, I am able to appreciate P waves, will watch on telemetry and repeat EKG to more definitively rule out A-fib  Optimize electrolytes

## 2024-06-18 NOTE — DISCHARGE PLANNING
HTH/SCP TCN chart review completed. No CM consulted. The most current review of medical record, knowledge of pt's PLOF and social support, LACE+ score of 22, no 6 clicks scores documented.    Pt seen at bedside. Introduced TCN program. Provided education regarding post acute levels of care. Education provided regarding case management policy for blanket SNF referrals. Discussed HTH/SCP plan benefits. Pt verbalizes understanding.     Patient reports no concerns with discharge home, reports she has home and normally does not use any DME, however lately using a SPC.  Patient reports she has a walker at home, and would prefer to use the one she own if a walker is recommended by medical team.  DME (O2) choice obtained, as well as HH, as patient reports she has not ambulated while in hospital, but anticipates her balance will be a little off.  Patient reports she believes her functional mobility will be good enough to discharge home.  Noted no 6 click scores documented, and no documented mobility in chart.  No further TCN needs at this time.     Choice proactively obtained for DEBORAH, DME (O2) and faxed to [facility]. TCN will continue to follow and collaborate with discharge planning team as additional post acute needs arise. Thank you.     Completed today:  Choice obtained: DEBORAH, MALACHI (O2 - [place])  SCP with [facility] P[facility]. discussed possible outpatient transitional PCP follow up if indicated and pt in agreement; sent information to assist in scheduling

## 2024-06-19 PROBLEM — I50.21 ACUTE SYSTOLIC (CONGESTIVE) HEART FAILURE (HCC): Status: ACTIVE | Noted: 2024-06-19

## 2024-06-19 PROBLEM — E83.42 HYPOMAGNESEMIA: Status: ACTIVE | Noted: 2024-06-19

## 2024-06-19 LAB
ANION GAP SERPL CALC-SCNC: 13 MMOL/L (ref 7–16)
BUN SERPL-MCNC: 11 MG/DL (ref 8–22)
CALCIUM SERPL-MCNC: 9 MG/DL (ref 8.5–10.5)
CHLORIDE SERPL-SCNC: 101 MMOL/L (ref 96–112)
CO2 SERPL-SCNC: 28 MMOL/L (ref 20–33)
CREAT SERPL-MCNC: 0.8 MG/DL (ref 0.5–1.4)
GFR SERPLBLD CREATININE-BSD FMLA CKD-EPI: 79 ML/MIN/1.73 M 2
GLUCOSE SERPL-MCNC: 100 MG/DL (ref 65–99)
MAGNESIUM SERPL-MCNC: 1.8 MG/DL (ref 1.5–2.5)
POTASSIUM SERPL-SCNC: 4 MMOL/L (ref 3.6–5.5)
SODIUM SERPL-SCNC: 142 MMOL/L (ref 135–145)

## 2024-06-19 PROCEDURE — 700102 HCHG RX REV CODE 250 W/ 637 OVERRIDE(OP): Performed by: STUDENT IN AN ORGANIZED HEALTH CARE EDUCATION/TRAINING PROGRAM

## 2024-06-19 PROCEDURE — 700102 HCHG RX REV CODE 250 W/ 637 OVERRIDE(OP): Performed by: INTERNAL MEDICINE

## 2024-06-19 PROCEDURE — A9270 NON-COVERED ITEM OR SERVICE: HCPCS | Performed by: INTERNAL MEDICINE

## 2024-06-19 PROCEDURE — 94669 MECHANICAL CHEST WALL OSCILL: CPT

## 2024-06-19 PROCEDURE — 700111 HCHG RX REV CODE 636 W/ 250 OVERRIDE (IP): Mod: JZ | Performed by: STUDENT IN AN ORGANIZED HEALTH CARE EDUCATION/TRAINING PROGRAM

## 2024-06-19 PROCEDURE — 80048 BASIC METABOLIC PNL TOTAL CA: CPT

## 2024-06-19 PROCEDURE — 99222 1ST HOSP IP/OBS MODERATE 55: CPT | Performed by: INTERNAL MEDICINE

## 2024-06-19 PROCEDURE — 99233 SBSQ HOSP IP/OBS HIGH 50: CPT | Performed by: STUDENT IN AN ORGANIZED HEALTH CARE EDUCATION/TRAINING PROGRAM

## 2024-06-19 PROCEDURE — 83735 ASSAY OF MAGNESIUM: CPT

## 2024-06-19 PROCEDURE — 36415 COLL VENOUS BLD VENIPUNCTURE: CPT

## 2024-06-19 PROCEDURE — A9270 NON-COVERED ITEM OR SERVICE: HCPCS | Performed by: STUDENT IN AN ORGANIZED HEALTH CARE EDUCATION/TRAINING PROGRAM

## 2024-06-19 PROCEDURE — 770020 HCHG ROOM/CARE - TELE (206)

## 2024-06-19 RX ORDER — ACETAMINOPHEN 325 MG/1
650 TABLET ORAL EVERY 4 HOURS PRN
Status: DISCONTINUED | OUTPATIENT
Start: 2024-06-19 | End: 2024-06-21 | Stop reason: HOSPADM

## 2024-06-19 RX ORDER — BISOPROLOL FUMARATE 5 MG/1
2.5 TABLET, FILM COATED ORAL
Status: DISCONTINUED | OUTPATIENT
Start: 2024-06-19 | End: 2024-06-21 | Stop reason: HOSPADM

## 2024-06-19 RX ORDER — MAGNESIUM SULFATE HEPTAHYDRATE 40 MG/ML
2 INJECTION, SOLUTION INTRAVENOUS ONCE
Status: COMPLETED | OUTPATIENT
Start: 2024-06-19 | End: 2024-06-19

## 2024-06-19 RX ADMIN — FUROSEMIDE 40 MG: 10 INJECTION, SOLUTION INTRAMUSCULAR; INTRAVENOUS at 17:26

## 2024-06-19 RX ADMIN — ACETAMINOPHEN 650 MG: 325 TABLET, FILM COATED ORAL at 09:39

## 2024-06-19 RX ADMIN — ACETAMINOPHEN 650 MG: 325 TABLET, FILM COATED ORAL at 18:07

## 2024-06-19 RX ADMIN — OLMESARTAN MEDOXOMIL 40 MG: 20 TABLET, FILM COATED ORAL at 17:23

## 2024-06-19 RX ADMIN — LOVASTATIN 20 MG: 20 TABLET ORAL at 05:15

## 2024-06-19 RX ADMIN — METOPROLOL TARTRATE 25 MG: 25 TABLET, FILM COATED ORAL at 05:15

## 2024-06-19 RX ADMIN — FUROSEMIDE 40 MG: 10 INJECTION, SOLUTION INTRAMUSCULAR; INTRAVENOUS at 05:14

## 2024-06-19 RX ADMIN — GABAPENTIN 100 MG: 100 CAPSULE ORAL at 05:14

## 2024-06-19 RX ADMIN — LEVOTHYROXINE SODIUM 125 MCG: 0.12 TABLET ORAL at 05:15

## 2024-06-19 RX ADMIN — ENOXAPARIN SODIUM 40 MG: 100 INJECTION SUBCUTANEOUS at 17:23

## 2024-06-19 RX ADMIN — SERTRALINE HYDROCHLORIDE 25 MG: 50 TABLET ORAL at 05:14

## 2024-06-19 RX ADMIN — DULOXETINE HYDROCHLORIDE 30 MG: 30 CAPSULE, DELAYED RELEASE ORAL at 05:15

## 2024-06-19 RX ADMIN — BISOPROLOL FUMARATE 2.5 MG: 5 TABLET ORAL at 17:22

## 2024-06-19 RX ADMIN — MAGNESIUM SULFATE HEPTAHYDRATE 2 G: 2 INJECTION, SOLUTION INTRAVENOUS at 07:59

## 2024-06-19 RX ADMIN — GABAPENTIN 100 MG: 100 CAPSULE ORAL at 12:44

## 2024-06-19 RX ADMIN — GABAPENTIN 100 MG: 100 CAPSULE ORAL at 17:22

## 2024-06-19 ASSESSMENT — PAIN DESCRIPTION - PAIN TYPE
TYPE: ACUTE PAIN;CHRONIC PAIN

## 2024-06-19 ASSESSMENT — ENCOUNTER SYMPTOMS
MYALGIAS: 0
COUGH: 0
NAUSEA: 0
PALPITATIONS: 0
HEADACHES: 0
CHILLS: 0
SHORTNESS OF BREATH: 0
HEMATOCHEZIA: 0
VOMITING: 0
WHEEZING: 0
FEVER: 0
HEMOPTYSIS: 0
DIAPHORESIS: 0
ABDOMINAL PAIN: 0
DIZZINESS: 0

## 2024-06-19 ASSESSMENT — FIBROSIS 4 INDEX: FIB4 SCORE: 0.93

## 2024-06-19 NOTE — CONSULTS
Cardiology Initial Consult Note    Date of note:    6/19/2024      Consulting Physician: Rashaad Hernandez M.D.    Name:   Deloris Brandt   YOB: 1955  Age:   68 y.o.  female   MRN:   [id number]      Reason for Consultation: new diagnosis of heart failure with reduced ejection fraction    HPI:  Deloris Brandt is a 68 y.o. female obesity, hypertension, hyperlipidemia, hypothyroidism, presented to ER 6/18/24 with complaints of shortness of breath.    Patient reports for the past 3-4 months, noticed she would get out of breath with exertion.  It was progressively getting worse.  2 week ago, she got extremely short of breath and also reported orthopnea and was awoken several times with shortness of breath.  She denies ever having any chest pain.  Came in as her breathing was getting worse.  She is better today.    She denies any chest pain, no fevers, chills, no really cough and has not been sick. She looks after her grandchildren.    In the ER, CXR suggestive pulmonary edema with elevated BNP she was given lkasix and has diuresed.  Mildly elevated d dimer with negative CTA, bilateral pleural effusions.     Patient has had -2.34 L out    She does check her BP at home and running 130s/70s    Problem list:  Principal Problem:    Acute hypoxemic respiratory failure (HCC) (POA: Yes)  Active Problems:    Hypothyroid (POA: Yes)    Mixed hyperlipidemia (POA: Yes)    Essential hypertension (POA: Yes)    Abnormal EKG (POA: Unknown)  Resolved Problems:    * No resolved hospital problems. *      Past Medical History:   Diagnosis Date    Hyperglycemia     Hyperlipidemia     Hypertension     Hypokalemia     Hypothyroid      Past Surgical History:   Procedure Laterality Date    OTHER ABDOMINAL SURGERY  01/01/2001    Gastric bypass    APPENDECTOMY  48 years ago    OTHER      tonsilectomy     Medications Prior to Admission   Medication Sig Dispense Refill Last Dose    aspirin (ASA) 325 MG Tab Take 325-650 mg by mouth 1 time a  day as needed (Pain).   FEW DAYS AGO at [facility]N    fluticasone (FLONASE) 50 MCG/ACT nasal spray Administer 1 Spray into affected nostril(S) 1 time a day as needed.   PRN at [facility]    Multiple Minerals-Vitamins (NUTRA-SUPPORT BONE PO) Take 1 Capsule by mouth every day.   6/17/2024 at AM    metoprolol tartrate (LOPRESSOR) 25 MG Tab Take 1 Tablet by mouth 2 times a day. 60 Tablet 2 6/17/2024 at 2100    DULoxetine (CYMBALTA) 30 MG Cap DR Particles TAKE 1 CAPSULE BY MOUTH EVERY DAY 90 Capsule 2 6/17/2024 at 2200    gabapentin (NEURONTIN) 100 MG Cap TAKE 1 CAPSULE BY MOUTH THREE TIMES DAILY (Patient taking differently: Take 100 mg by mouth 2 times a day.) 180 Capsule 2 6/17/2024 at 2200    sertraline (ZOLOFT) 25 MG tablet TAKE 1 TABLET BY MOUTH EVERY DAY 30 Tablet 11 6/17/2024 at 0830    lovastatin (MEVACOR) 20 MG Tab TAKE 1 TABLET BY MOUTH EVERY  Tablet 2 6/17/2024 at 2200    vitamin D (CHOLECALCIFEROL) 1000 Unit Tab Take 1,000 Units by mouth every day.   6/17/2024 at 0830    levothyroxine (SYNTHROID) 125 MCG Tab TAKE 1 TABLET BY MOUTH EVERY MORNING ON AN EMPTY STOMACH (Patient taking differently: Take 125 mcg by mouth every morning Monday through Friday.) 90 Tablet 3 6/17/2024 at AM    hydrochlorothiazide (MICROZIDE) 12.5 MG capsule Take 1 Capsule by mouth every day. 90 Capsule 2 6/17/2024 at 2200    amLODIPine-Olmesartan 5-40 MG Tab Take 1 Tablet by mouth every day. 100 Tablet 2 6/17/2024 at 2200    levothyroxine (SYNTHROID) 137 MCG Tab TAKE 1 TABLET BY MOUTH EVERY MORNING EVERY SATURDAY AND SUNDAY (Patient taking differently: Take 137 mcg by mouth two times a week. Every Saturday and Sunday) 90 Tablet 3 6/16/2024 at AM     Current Facility-Administered Medications   Medication Dose Route Frequency Provider Last Rate Last Admin    acetaminophen (Tylenol) tablet 650 mg  650 mg Oral Q4HRS PRN Rashaad Hernandez M.D.   650 mg at 06/19/24 0939    DULoxetine (Cymbalta) capsule 30 mg  30 mg Oral DAILY Jorge Rodriguez M.D.   30  mg at 06/19/24 0515    gabapentin (Neurontin) capsule 100 mg  100 mg Oral TID Jorge Rodriguez M.D.   100 mg at 06/19/24 0514    levothyroxine (Synthroid) tablet 125 mcg  125 mcg Oral AM ES Jorge Rodriguez M.D.   125 mcg at 06/19/24 0515    lovastatin (Mevacor) tablet 20 mg  20 mg Oral DAILY Jorge Rodriguez M.D.   20 mg at 06/19/24 0515    [Held by provider] metoprolol tartrate (Lopressor) tablet 25 mg  25 mg Oral BID Jorge Rodriguez M.D.   25 mg at 06/19/24 0515    sertraline (Zoloft) tablet 25 mg  25 mg Oral DAILY Jorge Rodriguez M.D.   25 mg at 06/19/24 0514    enoxaparin (Lovenox) inj 40 mg  40 mg Subcutaneous DAILY AT 1800 Jorge Rodriguez M.D.   40 mg at 06/18/24 1728    furosemide (Lasix) injection 40 mg  40 mg Intravenous BID DIURETIC Jorge Rodriguez M.D.   40 mg at 06/19/24 0514    amLODIPine (Norvasc) tablet 5 mg  5 mg Oral Q EVENING Rigoberto Rivera M.D.   5 mg at 06/18/24 1728    olmesartan (Benicar) tablet 40 mg  40 mg Oral Q EVENING Rigoberto Rivera M.D.   40 mg at 06/18/24 1728       No Known Allergies    Family History   Problem Relation Age of Onset    Cancer Mother     Diabetes Father        Social History     Socioeconomic History    Marital status: Single     Spouse name: Not on file    Number of children: Not on file    Years of education: Not on file    Highest education level: Not on file   Occupational History    Not on file   Tobacco Use    Smoking status: Never    Smokeless tobacco: Never   Vaping Use    Vaping status: Never Used   Substance and Sexual Activity    Alcohol use: No    Drug use: No    Sexual activity: Not Currently   Other Topics Concern     Service Not Asked    Blood Transfusions Not Asked    Caffeine Concern Not Asked    Occupational Exposure Not Asked    Hobby Hazards Not Asked    Sleep Concern Not Asked    Stress Concern Not Asked    Weight Concern Not Asked    Special Diet Not Asked    Back Care Not Asked    Exercise Not Asked    Bike Helmet  "Not Asked    Seat Belt Not Asked    Self-Exams Not Asked   Social History Narrative    Not on file     Social Determinants of Health     Financial Resource Strain: Low Risk  (3/18/2024)    Overall Financial Resource Strain (CARDIA)     Difficulty of Paying Living Expenses: Not very hard   Food Insecurity: No Food Insecurity (6/18/2024)    Hunger Vital Sign     Worried About Running Out of Food in the Last Year: Never true     Ran Out of Food in the Last Year: Never true   Transportation Needs: No Transportation Needs (6/18/2024)    PRAPARE - Transportation     Lack of Transportation (Medical): No     Lack of Transportation (Non-Medical): No   Physical Activity: Not on file   Stress: Not on file   Social Connections: Not on file   Intimate Partner Violence: Not At Risk (6/18/2024)    Humiliation, Afraid, Rape, and Kick questionnaire     Fear of Current or Ex-Partner: No     Emotionally Abused: No     Physically Abused: No     Sexually Abused: No   Housing Stability: Low Risk  (6/18/2024)    Housing Stability Vital Sign     Unable to Pay for Housing in the Last Year: No     Number of Places Lived in the Last Year: 1     Unstable Housing in the Last Year: No         Intake/Output Summary (Last 24 hours) at 6/19/2024 1034  Last data filed at 6/19/2024 0400  Gross per 24 hour   Intake 360 ml   Output 1450 ml   Net -1090 ml       Review of Systems   Constitutional: Negative for diaphoresis and fever.   Respiratory:  Negative for cough, hemoptysis and wheezing.    Gastrointestinal:  Negative for hematochezia and melena.   Genitourinary:  Negative for hematuria.        Physical Exam  Body mass index is 41.21 kg/m².  /72   Pulse 75   Temp 36.7 °C (98.1 °F) (Temporal)   Resp 14   Ht 1.575 m (5' 2\")   Wt 102 kg (225 lb 5 oz)   SpO2 93%   Vitals:    06/18/24 2346 06/19/24 0306 06/19/24 0400 06/19/24 0744   BP: 120/77 106/65  112/72   Pulse: 80 79  75   Resp: 12 12  14   Temp: 36.7 °C (98.1 °F) 36.8 °C (98.2 °F)  " 36.7 °C (98.1 °F)   TempSrc: Temporal Temporal  Temporal   SpO2: 93% 90%  93%   Weight:   102 kg (225 lb 5 oz)    Height:         Oxygen Therapy:  Pulse Oximetry: 93 %, O2 (LPM): 3, O2 Delivery Device: Nasal Cannula    Physical Exam  Constitutional:       Appearance: Normal appearance.   Eyes:      Extraocular Movements: Extraocular movements intact.      Conjunctiva/sclera: Conjunctivae normal.   Neck:      Vascular: No carotid bruit or JVD.   Cardiovascular:      Rate and Rhythm: Normal rate and regular rhythm.      Pulses:           Radial pulses are 2+ on the right side and 2+ on the left side.        Posterior tibial pulses are 1+ on the right side and 1+ on the left side.      Heart sounds: Normal heart sounds. No murmur heard.     No friction rub. No gallop.   Pulmonary:      Effort: Pulmonary effort is normal. No respiratory distress.      Breath sounds: Examination of the right-lower field reveals rales. Examination of the left-lower field reveals rales. Decreased breath sounds and rales present. No wheezing.   Abdominal:      General: Bowel sounds are normal.      Palpations: Abdomen is soft.   Musculoskeletal:      Cervical back: Neck supple.      Right lower leg: No edema.      Left lower leg: No edema.   Skin:     General: Skin is warm and dry.   Neurological:      Mental Status: She is alert and oriented to person, place, and time. Mental status is at baseline.   Psychiatric:         Mood and Affect: Mood normal.          Labs (personally reviewed and notable for):   Lab Results   Component Value Date/Time    SODIUM 142 06/19/2024 02:59 AM    POTASSIUM 4.0 06/19/2024 02:59 AM    CHLORIDE 101 06/19/2024 02:59 AM    CO2 28 06/19/2024 02:59 AM    GLUCOSE 100 (H) 06/19/2024 02:59 AM    BUN 11 06/19/2024 02:59 AM    CREATININE 0.80 06/19/2024 02:59 AM    CREATININE 0.8 06/02/2007 09:35 AM    BUNCREATRAT 31 (H) 02/11/2022 08:48 AM      Lab Results   Component Value Date/Time    WBC 10.2 06/18/2024 04:51 AM  "   RBC 5.18 06/18/2024 04:51 AM    HEMOGLOBIN 14.4 06/18/2024 04:51 AM    HEMATOCRIT 47.2 (H) 06/18/2024 04:51 AM    MCV 91.1 06/18/2024 04:51 AM    MCH 27.8 06/18/2024 04:51 AM    MCHC 30.5 (L) 06/18/2024 04:51 AM    MPV 10.6 06/18/2024 04:51 AM    NEUTSPOLYS 67.00 06/18/2024 04:51 AM    LYMPHOCYTES 20.40 (L) 06/18/2024 04:51 AM    MONOCYTES 7.10 06/18/2024 04:51 AM    EOSINOPHILS 4.00 06/18/2024 04:51 AM    BASOPHILS 1.10 06/18/2024 04:51 AM    INR 1.09 01/13/2018 02:27 PM      Lab Results   Component Value Date/Time    CHOLSTRLTOT 149 02/11/2022 08:48 AM    CHOLSTRLTOT 148 06/02/2007 09:35 AM    LDL 89 02/06/2020 07:47 AM    LDL 93 06/02/2007 09:35 AM    HDL 36 (L) 02/11/2022 08:48 AM    HDL 35 (L) 06/02/2007 09:35 AM    TRIGLYCERIDE 77 02/11/2022 08:48 AM    TRIGLYCERIDE 99 06/02/2007 09:35 AM       Lab Results   Component Value Date/Time    TROPONINT 15 06/18/2024 0451     Lab Results   Component Value Date/Time    NTPROBNP 2692 (H) 06/18/2024 0451     No results found for: \"HBA1C\"    Cardiac Imaging and Procedures Review (personally reviewed and notable for):    EKG dated 6/18/24: sinus, low voltages limb leads, LBBB compared to prior ECG of 1/13/2018, LBBB now present    Echo dated 6/18/24:  Severely decreased LVEF with akinesis and thinning of anterior, anteroseptal, inferoseptal and apical wall.  Moderate eccentric mitral regurgitation.    Nuclear Perfusion Imaging 4/22/2005:No evidence of ischemia or infarction, EF 76%    Telemetry (last 24 hours): sinus rare PVC    Radiology test Review:  EC-ECHOCARDIOGRAM COMPLETE W/O CONT   Final Result      CT-CTA CHEST PULMONARY ARTERY W/ RECONS   Final Result         1.  No pulmonary embolus appreciated.   2.  Moderate layering bilateral pleural effusions.   3.  Pulmonary infiltrates, greatest in the upper lobes   4.  Perihilar consolidations, appearance favoring perihilar infiltrates   5.  Small hiatal hernia   6.  Irregular hepatic contour, appearance favoring " changes of cirrhosis   7.  Atherosclerosis and atherosclerotic coronary artery disease.      DX-CHEST-PORTABLE (1 VIEW)   Final Result         1.  Pulmonary edema and/or infiltrates   2.  Cardiomegaly          Impression and Medical Decision Making:    New diagnosis of HFrEF with LBBB.  Clinical presentation consistent with CHF and fluid overload improved with lasix.  Still requiring O2.  Discussed HFrEF treatment and causes.  Would recommend cardiac cath to rule out obstructive CAD as cause as echo suggest possible ischemic CM. The risks, benefits, and alternatives to coronary angiography with possible percutaneous coronary intervention and IV sedation were discussed in great detail. Specific risks mentioned include bleeding that may require blood transfusion, kidney damage from IV contrast allergy, infection, allergic reaction, arterial damage that may require intervention or surgery, cardiac perforation with possible tamponade requiring miguelito-cardiocentesis or possible open heart surgery. We also discussed in great detail a stent is placed, they will have to be on two antiplatelet agents (blood thinners) for up to 1 year.  Verified with patient no planned surgeries/procedures are planned within the next year.  Verified with patient no recent bleeding.  In addition, we discussed that 10% of patients will experience small to moderate bruising at the side of the arterial puncture. Lastly the risks of heart attack, stroke, and death were discussed; the risks of major complications such as heart attack or stroke caused by the angiogram is less than 1%; the risk of death is approximately 1 in 1000. The patient verbalized understanding of these potential complications and wishes to proceed with this procedure.  Pt still on 3 L NC so will diurese one more day.  Plan cardiac cath tomorrow 6/20.  NPO after midnight.  ECASA 81 mg po qd  Stop amlodipine  Continue with lasix IV  Continue with benicar 40 mg po qd  Change  metoprolol over to bisoprolol  If no significant CAD, can check for other causes ie infiltrative    Thank you for allowing me to participate in the care of this patient.  Please contact me with any questions.      Sydney Grove M.D.  Cardiologist, [facility]     This note was generated using voice recognition software which has a small chance of producing errors of grammar and possibly content. I have made every reasonable attempt to find and correct any obvious errors, but expect that some may not be found prior to finalization of this note.

## 2024-06-19 NOTE — CARE PLAN
The patient is Stable - Low risk of patient condition declining or worsening    Shift Goals  Clinical Goals: monitor hemodynamics  Patient Goals: comfort  Family Goals: Updates    Progress made toward(s) clinical / shift goals:  Patient has remained hemodynamically stable and able to decrease O2 from 4 L to 3 LPM NC with patient's SPO2 above 90%.       Problem: Knowledge Deficit - Standard  Goal: Patient and family/care givers will demonstrate understanding of plan of care, disease process/condition, diagnostic tests and medications  Outcome: Progressing     Problem: Fall Risk  Goal: Patient will remain free from falls  Outcome: Progressing     Problem: Psychosocial  Goal: Patient's level of anxiety will decrease  Outcome: Progressing     Problem: Communication  Goal: The ability to communicate needs accurately and effectively will improve  Outcome: Progressing     Problem: Respiratory  Goal: Patient will achieve/maintain optimum respiratory ventilation and gas exchange  Outcome: Progressing     Problem: Fluid Volume  Goal: Fluid volume balance will be maintained  Outcome: Progressing     Problem: Urinary Elimination  Goal: Establish and maintain regular urinary output  Outcome: Progressing     Problem: Mobility  Goal: Patient's capacity to carry out activities will improve  Outcome: Progressing     Problem: Self Care  Goal: Patient will have the ability to perform ADLs independently or with assistance (bathe, groom, dress, toilet and feed)  Outcome: Progressing

## 2024-06-19 NOTE — PROGRESS NOTES
Report received from day shift RN, assumed patient care. Patient is calmly resting in bed, no signs of distress, even and unlabored breathing noted. Pt on 4 LPM NC O2. A&Ox4. Tele box on and in place. Patient has call light within reach, fall precautions in place. Assisted patient with hand held assistance to bathroom and back to bed. Hourly rounding initiated.

## 2024-06-19 NOTE — PROGRESS NOTES
Hospital Medicine Daily Progress Note    Date of Service  6/19/2024    Chief Complaint  Deloris Brandt is a 68 y.o. female admitted 6/18/2024 with shortness of breath.    Hospital Course  Deloris Brandt is a 68 y.o. female who presented 6/18/2024 with very pleasant 68-year-old woman with history of hyperlipidemia, hypertension, hypothyroidism.  She presents due to shortness of breath.  Over the past 2 weeks she has had episodes of feeling very short of breath.  She has had orthopnea and has been awoken with shortness of breath several times.  She has also noticed dyspnea on exertion and decreased tolerance of ambulation/exertion.  Episodes of increased much more over the past few days prompting her to come into the ED.  Chest x-ray she was found to have pulmonary edema.  Troponin nonelevated but BNP elevated at 2692.  Had mildly elevated D-dimer at 0.83, CTA pending at time of admission, viral panel negative.  Was given ceftriaxone and azithromycin in ED.  She was requiring additional oxygen due to SpO2 of 86% on arrival.     Interval Problem Update  6/19/2024  Patient was seen and examined on the telemetry floor.  Continues on continuous cardiac monitoring.  Shortness of breath is improved.  Cardiology consulted.  Discussed with Dr. Grove of cardiology.  Planning for left heart catheterization tomorrow.  Currently on 2 LPM oxygen by nasal cannula.  Baseline is room air.  Potassium 4.0.  Magnesium 1.8 replace.  Urine output of 2.7 L yesterday.  Ejection fraction of 25% on echocardiogram  Continuing on furosemide 40 mg IV twice daily.      I have discussed this patient's plan of care and discharge plan at IDT rounds today with Case Management, Nursing, Nursing leadership, and other members of the IDT team.    Consultants/Specialty  cardiology    Code Status  Full Code    Disposition  The patient is not medically cleared for discharge to home or a post-acute facility.  Anticipate discharge to: home with close  outpatient follow-up    I have placed the appropriate orders for post-discharge needs.    Review of Systems  Review of Systems   Constitutional:  Negative for chills and fever.   Respiratory:  Negative for cough and shortness of breath.    Cardiovascular:  Negative for chest pain and palpitations.   Gastrointestinal:  Negative for abdominal pain, nausea and vomiting.   Musculoskeletal:  Negative for joint pain and myalgias.   Neurological:  Negative for dizziness and headaches.        Physical Exam  Temp:  [36.4 °C (97.5 °F)-36.8 °C (98.2 °F)] 36.4 °C (97.5 °F)  Pulse:  [75-83] 83  Resp:  [12-17] 17  BP: ()/(50-77) 99/63  SpO2:  [90 %-95 %] 95 %    Physical Exam  Vitals and nursing note reviewed. Exam conducted with a chaperone present.   Constitutional:       Appearance: She is obese. She is ill-appearing. She is not diaphoretic.      Interventions: Nasal cannula in place.   HENT:      Head: Normocephalic and atraumatic.      Mouth/Throat:      Mouth: Mucous membranes are moist.      Pharynx: Oropharynx is clear. No oropharyngeal exudate.   Eyes:      General:         Right eye: No discharge.         Left eye: No discharge.      Conjunctiva/sclera: Conjunctivae normal.      Pupils: Pupils are equal, round, and reactive to light.   Cardiovascular:      Rate and Rhythm: Normal rate and regular rhythm.      Pulses: Normal pulses.      Heart sounds: Normal heart sounds. No murmur heard.  Pulmonary:      Effort: Pulmonary effort is normal. No respiratory distress.      Breath sounds: Normal breath sounds.   Abdominal:      General: Abdomen is flat. Bowel sounds are normal. There is no distension.      Palpations: Abdomen is soft.      Tenderness: There is no abdominal tenderness.   Musculoskeletal:      Cervical back: Neck supple. No tenderness.      Right lower le+ Pitting Edema present.      Left lower le+ Pitting Edema present.   Neurological:      Mental Status: She is alert and oriented to person,  place, and time.      Motor: No weakness.   Psychiatric:         Thought Content: Thought content normal.         Judgment: Judgment normal.         Fluids    Intake/Output Summary (Last 24 hours) at 6/19/2024 1719  Last data filed at 6/19/2024 1200  Gross per 24 hour   Intake 720 ml   Output 1600 ml   Net -880 ml       Laboratory  Recent Labs     06/18/24  0451   WBC 10.2   RBC 5.18   HEMOGLOBIN 14.4   HEMATOCRIT 47.2*   MCV 91.1   MCH 27.8   MCHC 30.5*   RDW 46.1   PLATELETCT 365   MPV 10.6     Recent Labs     06/18/24  0451 06/19/24  0259   SODIUM 145 142   POTASSIUM 4.2 4.0   CHLORIDE 108 101   CO2 22 28   GLUCOSE 112* 100*   BUN 12 11   CREATININE 0.82 0.80   CALCIUM 9.5 9.0                   Imaging  EC-ECHOCARDIOGRAM COMPLETE W/O CONT   Final Result      CT-CTA CHEST PULMONARY ARTERY W/ RECONS   Final Result         1.  No pulmonary embolus appreciated.   2.  Moderate layering bilateral pleural effusions.   3.  Pulmonary infiltrates, greatest in the upper lobes   4.  Perihilar consolidations, appearance favoring perihilar infiltrates   5.  Small hiatal hernia   6.  Irregular hepatic contour, appearance favoring changes of cirrhosis   7.  Atherosclerosis and atherosclerotic coronary artery disease.      DX-CHEST-PORTABLE (1 VIEW)   Final Result         1.  Pulmonary edema and/or infiltrates   2.  Cardiomegaly           Assessment/Plan  * Acute systolic (congestive) heart failure (HCC)- (present on admission)  Assessment & Plan  6/19/2024  Echo  CONCLUSIONS  Severely reduced left ventricular systolic function.  Akinsis and thinning of the anterior, anteroseptal, inferoseptal and   apical wall segments compatible with prior LAD territory infarct.  Moderate eccentric mitral regurgitation - consider further assessement   if there is clinical concern for more significant mitral regurgitation.   Ejection fraction of 25%.    I discussed the case with Dr. Grove of cardiology.  Planning for left heart catheterization  tomorrow.  N.p.o. after midnight.  Continue furosemide 40 mg IV twice daily  Metoprolol changed to bisoprolol.    Continue olmesartan 40 mg daily.  Continuous cardiac monitoring to force diuresis to monitor for arrhythmias.    Hypomagnesemia- (present on admission)  Assessment & Plan  6/19/2024  Magnesium level 1.8.  Have ordered magnesium sulfate 2 g IV for goal greater than 2.    Abnormal EKG  Assessment & Plan  EKG read as atrial fibrillation which patient does not have existing diagnosis of, I am able to appreciate P waves, will watch on telemetry and repeat EKG to more definitively rule out A-fib  Optimize electrolytes    Acute respiratory failure with hypoxia (HCC)- (present on admission)  Assessment & Plan  Increasing shortness of breath over the last 2 weeks with dyspnea on exertion and orthopnea  Pulmonary edema on chest x-ray  Elevated BNP  Suspect likely heart failure as etiology  Ordered echocardiogram  Diuresis  Possible pneumonia but no cough and more subacute presentation, was given ceftriaxone and azithromycin, checking procalcitonin and holding off on further antibiotics at this time, continue to reevaluate need for antibiotics.  Watch on telemetry  Wean from oxygen as tolerated  Optimize electrolytes    6/19/2024  Improving to 2 LPM.  Currently.  Baseline is room air.  Secondary to acute systolic heart failure.  Continue IV forced diuresis  PEP therapy and incentive spirometry.  Continuous pulse oximetry monitoring    Essential hypertension- (present on admission)  Assessment & Plan  Continue amlodipine    Mixed hyperlipidemia- (present on admission)  Assessment & Plan  Continue lovastatin    Hypothyroid- (present on admission)  Assessment & Plan  Continue levothyroxine         VTE prophylaxis:    enoxaparin ppx      I have performed a physical exam and reviewed and updated ROS and Plan today (6/19/2024). In review of yesterday's note (6/18/2024), there are no changes except as documented  above.

## 2024-06-19 NOTE — PROGRESS NOTES
Monitor Summary:     Rhythm: SR  Rate: 73-78  Ectopy: (R)PVC  Measurements: 0.15/0.11/0.51           12 Hour Chart Check

## 2024-06-19 NOTE — CARE PLAN
Problem: Knowledge Deficit - Standard  Goal: Patient and family/care givers will demonstrate understanding of plan of care, disease process/condition, diagnostic tests and medications  6/19/2024 1620 by Thomas Ferro R.N.  Outcome: Progressing  6/19/2024 1619 by Thomas Ferro R.N.  Outcome: Progressing     Problem: Fall Risk  Goal: Patient will remain free from falls  6/19/2024 1620 by Thomas Ferro R.N.  Outcome: Progressing  6/19/2024 1619 by Thomas Ferro R.N.  Outcome: Progressing     Problem: Psychosocial  Goal: Patient's level of anxiety will decrease  6/19/2024 1620 by Thomas Ferro R.N.  Outcome: Progressing  6/19/2024 1619 by Thomas Ferro R.N.  Outcome: Progressing  Goal: Patient's ability to verbalize feelings about condition will improve  6/19/2024 1620 by Thomas Ferro R.N.  Outcome: Progressing  6/19/2024 1619 by Thomas Ferro R.N.  Outcome: Progressing  Goal: Patient's ability to re-evaluate and adapt role responsibilities will improve  6/19/2024 1620 by Thomas Ferro R.N.  Outcome: Progressing  6/19/2024 1619 by Thomas Ferro, R.N.  Outcome: Progressing  Goal: Patient and family will demonstrate ability to cope with life altering diagnosis and/or procedure  6/19/2024 1620 by Thomas Ferro, R.N.  Outcome: Progressing  6/19/2024 1619 by Thomas Ferro, R.N.  Outcome: Progressing  Goal: Spiritual and cultural needs incorporated into hospitalization  6/19/2024 1620 by Thomas Ferro R.N.  Outcome: Progressing  6/19/2024 1619 by Thomas Ferro, R.N.  Outcome: Progressing     Problem: Communication  Goal: The ability to communicate needs accurately and effectively will improve  6/19/2024 1620 by Thomas Ferro R.N.  Outcome: Progressing  6/19/2024 1619 by Thomas Ferro R.N.  Outcome: Progressing     Problem: Discharge Barriers/Planning  Goal: Patient's continuum of care needs are met  6/19/2024 1620 by Thomas Ferro,  RCelesteN.  Outcome: Progressing  6/19/2024 1619 by Thomas Ferro RCelesteN.  Outcome: Progressing     Problem: Hemodynamics  Goal: Patient's hemodynamics, fluid balance and neurologic status will be stable or improve  6/19/2024 1620 by Thomas Ferro R.N.  Outcome: Progressing  6/19/2024 1619 by Thomas Ferro R.N.  Outcome: Progressing     Problem: Respiratory  Goal: Patient will achieve/maintain optimum respiratory ventilation and gas exchange  6/19/2024 1620 by Thomas Ferro R.N.  Outcome: Progressing  6/19/2024 1619 by Thomas Ferro R.N.  Outcome: Progressing     Problem: Fluid Volume  Goal: Fluid volume balance will be maintained  6/19/2024 1620 by MARIA DE JESUS Newton.N.  Outcome: Progressing  6/19/2024 1619 by Thomas Ferro R.N.  Outcome: Progressing     Problem: Risk for Aspiration  Goal: Patient's risk for aspiration will be absent or decrease  6/19/2024 1620 by MARIA DE JESUS Newton.N.  Outcome: Progressing  6/19/2024 1619 by Thomas Ferro R.N.  Outcome: Progressing     Problem: Nutrition  Goal: Patient's nutritional and fluid intake will be adequate or improve  6/19/2024 1620 by Thomas Ferro R.N.  Outcome: Progressing  6/19/2024 1619 by Thomas Ferro R.N.  Outcome: Progressing  Goal: Enteral nutrition will be maintained or improve  6/19/2024 1620 by Thomas Ferro R.N.  Outcome: Progressing  6/19/2024 1619 by Thomas Ferro, R.N.  Outcome: Progressing     Problem: Urinary Elimination  Goal: Establish and maintain regular urinary output  6/19/2024 1620 by Thomas Ferro R.N.  Outcome: Progressing  6/19/2024 1619 by Thomas Ferro, R.N.  Outcome: Progressing     Problem: Bowel Elimination  Goal: Establish and maintain regular bowel function  6/19/2024 1620 by Thomas Ferro R.N.  Outcome: Progressing  6/19/2024 1619 by Thomas Ferro R.N.  Outcome: Progressing     Problem: Gastrointestinal Irritability  Goal: Nausea and vomiting will be absent or  improve  6/19/2024 1620 by Thomas Ferro RCelesteN.  Outcome: Progressing  6/19/2024 1619 by Thomas Ferro R.N.  Outcome: Progressing  Goal: Diarrhea will be absent or improved  6/19/2024 1620 by Thomas Ferro R.N.  Outcome: Progressing  6/19/2024 1619 by Thomas Ferro R.N.  Outcome: Progressing     Problem: Mobility  Goal: Patient's capacity to carry out activities will improve  6/19/2024 1620 by Thomas Ferro R.N.  Outcome: Progressing  6/19/2024 1619 by Thomas Ferro R.N.  Outcome: Progressing     Problem: Self Care  Goal: Patient will have the ability to perform ADLs independently or with assistance (bathe, groom, dress, toilet and feed)  6/19/2024 1620 by Thomas Ferro RCelesteN.  Outcome: Progressing  6/19/2024 1619 by Thomas Ferro R.N.  Outcome: Progressing     Problem: Infection - Standard  Goal: Patient will remain free from infection  6/19/2024 1620 by Thomas Ferro R.N.  Outcome: Progressing  6/19/2024 1619 by Thomas Ferro R.N.  Outcome: Progressing     Problem: Pain - Standard  Goal: Alleviation of pain or a reduction in pain to the patient’s comfort goal  6/19/2024 1620 by Thomas Ferro R.N.  Outcome: Progressing  6/19/2024 1619 by Thomas Ferro R.N.  Outcome: Progressing     Problem: Care Map:  Admission Optimal Outcome for the Heart Failure Patient  Goal: Admission:  Optimal Care of the heart failure patient  Outcome: Progressing     Problem: Care Map:  Day 1 Optimal Outcome for the Heart Failure Patient  Goal: Day 1:  Optimal Care of the heart failure patient  Outcome: Progressing   The patient is Watcher - Medium risk of patient condition declining or worsening    Shift Goals  Clinical Goals: VSS, safety, comfort, NPO  Patient Goals: Updates  Family Goals: Updates    Progress made toward(s) clinical / shift goals:  VSS, safety, comfort, echo, heart failure education    Patient is not progressing towards the following goals:

## 2024-06-20 ENCOUNTER — APPOINTMENT (OUTPATIENT)
Dept: CARDIOLOGY | Facility: MEDICAL CENTER | Age: 69
DRG: 286 | End: 2024-06-20
Attending: INTERNAL MEDICINE
Payer: MEDICARE

## 2024-06-20 ENCOUNTER — APPOINTMENT (OUTPATIENT)
Dept: RADIOLOGY | Facility: MEDICAL CENTER | Age: 69
DRG: 286 | End: 2024-06-20
Attending: STUDENT IN AN ORGANIZED HEALTH CARE EDUCATION/TRAINING PROGRAM
Payer: MEDICARE

## 2024-06-20 LAB
ANION GAP SERPL CALC-SCNC: 11 MMOL/L (ref 7–16)
BUN SERPL-MCNC: 20 MG/DL (ref 8–22)
CALCIUM SERPL-MCNC: 8.8 MG/DL (ref 8.5–10.5)
CHLORIDE SERPL-SCNC: 103 MMOL/L (ref 96–112)
CO2 SERPL-SCNC: 28 MMOL/L (ref 20–33)
CREAT SERPL-MCNC: 0.98 MG/DL (ref 0.5–1.4)
FERRITIN SERPL-MCNC: 134 NG/ML (ref 10–291)
GFR SERPLBLD CREATININE-BSD FMLA CKD-EPI: 62 ML/MIN/1.73 M 2
GLUCOSE SERPL-MCNC: 111 MG/DL (ref 65–99)
IRON SATN MFR SERPL: 20 % (ref 15–55)
IRON SERPL-MCNC: 45 UG/DL (ref 40–170)
MAGNESIUM SERPL-MCNC: 2.4 MG/DL (ref 1.5–2.5)
POTASSIUM SERPL-SCNC: 3.7 MMOL/L (ref 3.6–5.5)
SODIUM SERPL-SCNC: 142 MMOL/L (ref 135–145)
TIBC SERPL-MCNC: 226 UG/DL (ref 250–450)
UIBC SERPL-MCNC: 181 UG/DL (ref 110–370)

## 2024-06-20 PROCEDURE — 700102 HCHG RX REV CODE 250 W/ 637 OVERRIDE(OP): Performed by: STUDENT IN AN ORGANIZED HEALTH CARE EDUCATION/TRAINING PROGRAM

## 2024-06-20 PROCEDURE — 82784 ASSAY IGA/IGD/IGG/IGM EACH: CPT

## 2024-06-20 PROCEDURE — 83521 IG LIGHT CHAINS FREE EACH: CPT

## 2024-06-20 PROCEDURE — 93458 L HRT ARTERY/VENTRICLE ANGIO: CPT | Mod: 26 | Performed by: INTERNAL MEDICINE

## 2024-06-20 PROCEDURE — B3101ZZ FLUOROSCOPY OF THORACIC AORTA USING LOW OSMOLAR CONTRAST: ICD-10-PCS | Performed by: INTERNAL MEDICINE

## 2024-06-20 PROCEDURE — 700102 HCHG RX REV CODE 250 W/ 637 OVERRIDE(OP): Performed by: INTERNAL MEDICINE

## 2024-06-20 PROCEDURE — 700117 HCHG RX CONTRAST REV CODE 255: Performed by: INTERNAL MEDICINE

## 2024-06-20 PROCEDURE — A9270 NON-COVERED ITEM OR SERVICE: HCPCS | Performed by: STUDENT IN AN ORGANIZED HEALTH CARE EDUCATION/TRAINING PROGRAM

## 2024-06-20 PROCEDURE — 700111 HCHG RX REV CODE 636 W/ 250 OVERRIDE (IP)

## 2024-06-20 PROCEDURE — 82728 ASSAY OF FERRITIN: CPT

## 2024-06-20 PROCEDURE — 83540 ASSAY OF IRON: CPT

## 2024-06-20 PROCEDURE — 4A023N7 MEASUREMENT OF CARDIAC SAMPLING AND PRESSURE, LEFT HEART, PERCUTANEOUS APPROACH: ICD-10-PCS | Performed by: INTERNAL MEDICINE

## 2024-06-20 PROCEDURE — 99232 SBSQ HOSP IP/OBS MODERATE 35: CPT | Mod: 25 | Performed by: INTERNAL MEDICINE

## 2024-06-20 PROCEDURE — 80048 BASIC METABOLIC PNL TOTAL CA: CPT

## 2024-06-20 PROCEDURE — 86334 IMMUNOFIX E-PHORESIS SERUM: CPT

## 2024-06-20 PROCEDURE — 99152 MOD SED SAME PHYS/QHP 5/>YRS: CPT

## 2024-06-20 PROCEDURE — 700111 HCHG RX REV CODE 636 W/ 250 OVERRIDE (IP): Mod: JZ | Performed by: INTERNAL MEDICINE

## 2024-06-20 PROCEDURE — 93567 NJX CAR CTH SPRVLV AORTGRPHY: CPT | Performed by: INTERNAL MEDICINE

## 2024-06-20 PROCEDURE — 71045 X-RAY EXAM CHEST 1 VIEW: CPT

## 2024-06-20 PROCEDURE — 99233 SBSQ HOSP IP/OBS HIGH 50: CPT | Performed by: STUDENT IN AN ORGANIZED HEALTH CARE EDUCATION/TRAINING PROGRAM

## 2024-06-20 PROCEDURE — 83550 IRON BINDING TEST: CPT

## 2024-06-20 PROCEDURE — 770020 HCHG ROOM/CARE - TELE (206)

## 2024-06-20 PROCEDURE — 700101 HCHG RX REV CODE 250

## 2024-06-20 PROCEDURE — B2111ZZ FLUOROSCOPY OF MULTIPLE CORONARY ARTERIES USING LOW OSMOLAR CONTRAST: ICD-10-PCS | Performed by: INTERNAL MEDICINE

## 2024-06-20 PROCEDURE — A9270 NON-COVERED ITEM OR SERVICE: HCPCS

## 2024-06-20 PROCEDURE — 83735 ASSAY OF MAGNESIUM: CPT

## 2024-06-20 PROCEDURE — 84155 ASSAY OF PROTEIN SERUM: CPT

## 2024-06-20 PROCEDURE — 99152 MOD SED SAME PHYS/QHP 5/>YRS: CPT | Performed by: INTERNAL MEDICINE

## 2024-06-20 PROCEDURE — A9270 NON-COVERED ITEM OR SERVICE: HCPCS | Performed by: INTERNAL MEDICINE

## 2024-06-20 PROCEDURE — 84165 PROTEIN E-PHORESIS SERUM: CPT

## 2024-06-20 PROCEDURE — 700111 HCHG RX REV CODE 636 W/ 250 OVERRIDE (IP): Mod: JZ | Performed by: STUDENT IN AN ORGANIZED HEALTH CARE EDUCATION/TRAINING PROGRAM

## 2024-06-20 PROCEDURE — 700102 HCHG RX REV CODE 250 W/ 637 OVERRIDE(OP)

## 2024-06-20 RX ORDER — VERAPAMIL HYDROCHLORIDE 2.5 MG/ML
INJECTION, SOLUTION INTRAVENOUS
Status: COMPLETED
Start: 2024-06-20 | End: 2024-06-20

## 2024-06-20 RX ORDER — HEPARIN SODIUM 200 [USP'U]/100ML
INJECTION, SOLUTION INTRAVENOUS
Status: COMPLETED
Start: 2024-06-20 | End: 2024-06-20

## 2024-06-20 RX ORDER — HEPARIN SODIUM 1000 [USP'U]/ML
INJECTION, SOLUTION INTRAVENOUS; SUBCUTANEOUS
Status: COMPLETED
Start: 2024-06-20 | End: 2024-06-20

## 2024-06-20 RX ORDER — ASPIRIN 81 MG/1
81 TABLET ORAL DAILY
Status: DISCONTINUED | OUTPATIENT
Start: 2024-06-20 | End: 2024-06-21 | Stop reason: HOSPADM

## 2024-06-20 RX ORDER — ASPIRIN 81 MG/1
TABLET, CHEWABLE ORAL
Status: COMPLETED
Start: 2024-06-20 | End: 2024-06-20

## 2024-06-20 RX ORDER — DAPAGLIFLOZIN 10 MG/1
10 TABLET, FILM COATED ORAL DAILY
Status: DISCONTINUED | OUTPATIENT
Start: 2024-06-21 | End: 2024-06-21 | Stop reason: HOSPADM

## 2024-06-20 RX ORDER — FUROSEMIDE 40 MG/1
40 TABLET ORAL
Status: DISCONTINUED | OUTPATIENT
Start: 2024-06-21 | End: 2024-06-21 | Stop reason: HOSPADM

## 2024-06-20 RX ORDER — SPIRONOLACTONE 25 MG/1
25 TABLET ORAL
Status: DISCONTINUED | OUTPATIENT
Start: 2024-06-20 | End: 2024-06-21 | Stop reason: HOSPADM

## 2024-06-20 RX ORDER — LIDOCAINE HYDROCHLORIDE 20 MG/ML
INJECTION, SOLUTION INFILTRATION; PERINEURAL
Status: COMPLETED
Start: 2024-06-20 | End: 2024-06-20

## 2024-06-20 RX ORDER — MIDAZOLAM HYDROCHLORIDE 1 MG/ML
INJECTION INTRAMUSCULAR; INTRAVENOUS
Status: COMPLETED
Start: 2024-06-20 | End: 2024-06-20

## 2024-06-20 RX ORDER — FUROSEMIDE 10 MG/ML
40 INJECTION INTRAMUSCULAR; INTRAVENOUS
Status: COMPLETED | OUTPATIENT
Start: 2024-06-20 | End: 2024-06-20

## 2024-06-20 RX ADMIN — LIDOCAINE HYDROCHLORIDE: 20 INJECTION, SOLUTION INFILTRATION; PERINEURAL at 10:08

## 2024-06-20 RX ADMIN — SERTRALINE HYDROCHLORIDE 25 MG: 50 TABLET ORAL at 05:54

## 2024-06-20 RX ADMIN — ASPIRIN 324 MG: 81 TABLET, CHEWABLE ORAL at 10:31

## 2024-06-20 RX ADMIN — SPIRONOLACTONE 25 MG: 25 TABLET ORAL at 09:09

## 2024-06-20 RX ADMIN — NITROGLYCERIN 10 ML: 20 INJECTION INTRAVENOUS at 10:08

## 2024-06-20 RX ADMIN — LOVASTATIN 20 MG: 20 TABLET ORAL at 05:54

## 2024-06-20 RX ADMIN — ENOXAPARIN SODIUM 40 MG: 100 INJECTION SUBCUTANEOUS at 17:06

## 2024-06-20 RX ADMIN — GABAPENTIN 100 MG: 100 CAPSULE ORAL at 05:54

## 2024-06-20 RX ADMIN — FUROSEMIDE 40 MG: 10 INJECTION, SOLUTION INTRAMUSCULAR; INTRAVENOUS at 05:54

## 2024-06-20 RX ADMIN — IOHEXOL 70 ML: 350 INJECTION, SOLUTION INTRAVENOUS at 10:48

## 2024-06-20 RX ADMIN — GABAPENTIN 100 MG: 100 CAPSULE ORAL at 11:36

## 2024-06-20 RX ADMIN — BISOPROLOL FUMARATE 2.5 MG: 5 TABLET ORAL at 05:54

## 2024-06-20 RX ADMIN — ASPIRIN 81 MG: 81 TABLET, COATED ORAL at 14:25

## 2024-06-20 RX ADMIN — LEVOTHYROXINE SODIUM 125 MCG: 0.12 TABLET ORAL at 05:54

## 2024-06-20 RX ADMIN — VERAPAMIL HYDROCHLORIDE 5 MG: 2.5 INJECTION, SOLUTION INTRAVENOUS at 10:08

## 2024-06-20 RX ADMIN — MIDAZOLAM HYDROCHLORIDE 1 MG: 1 INJECTION, SOLUTION INTRAMUSCULAR; INTRAVENOUS at 10:38

## 2024-06-20 RX ADMIN — DULOXETINE HYDROCHLORIDE 30 MG: 30 CAPSULE, DELAYED RELEASE ORAL at 05:54

## 2024-06-20 RX ADMIN — FUROSEMIDE 40 MG: 10 INJECTION, SOLUTION INTRAVENOUS at 15:54

## 2024-06-20 RX ADMIN — GABAPENTIN 100 MG: 100 CAPSULE ORAL at 17:06

## 2024-06-20 RX ADMIN — HEPARIN SODIUM 2000 UNITS: 200 INJECTION, SOLUTION INTRAVENOUS at 10:08

## 2024-06-20 RX ADMIN — FENTANYL CITRATE 50 MCG: 50 INJECTION, SOLUTION INTRAMUSCULAR; INTRAVENOUS at 10:38

## 2024-06-20 RX ADMIN — HEPARIN SODIUM: 1000 INJECTION, SOLUTION INTRAVENOUS; SUBCUTANEOUS at 10:08

## 2024-06-20 ASSESSMENT — ENCOUNTER SYMPTOMS
FEVER: 0
PALPITATIONS: 0
ABDOMINAL PAIN: 0
NAUSEA: 0
SHORTNESS OF BREATH: 0
DIZZINESS: 0
COUGH: 0
MYALGIAS: 0
HEADACHES: 0
CHILLS: 0
VOMITING: 0

## 2024-06-20 ASSESSMENT — FIBROSIS 4 INDEX: FIB4 SCORE: 0.93

## 2024-06-20 NOTE — ASSESSMENT & PLAN NOTE
6/19/2024  Magnesium level 1.8.  Have ordered magnesium sulfate 2 g IV for goal greater than 2.    6/20/2024  Resolved

## 2024-06-20 NOTE — PROGRESS NOTES
Cardiology Follow-up Consult Note    Date of Service:    6/20/2024      Consulting Physician: Rashaad Hernandez M.D.    Name:   Deloris Brandt   YOB: 1955  Age:   68 y.o.  female   MRN:   [id number]      Subjective:  Patient reports breathing is much better, no chest pain.  Patient was seen prior to her cardiac catheterization this morning.    All other review of systems reviewed and negative.    Past medical, surgical, social, and family history reviewed and unchanged from admission except as noted in assessment and plan.    Medications Prior to Admission   Medication Sig Dispense Refill Last Dose    aspirin (ASA) 325 MG Tab Take 325-650 mg by mouth 1 time a day as needed (Pain).   FEW DAYS AGO at PRN    fluticasone (FLONASE) 50 MCG/ACT nasal spray Administer 1 Spray into affected nostril(S) 1 time a day as needed.   PRN at PRN    Multiple Minerals-Vitamins (NUTRA-SUPPORT BONE PO) Take 1 Capsule by mouth every day.   6/17/2024 at AM    metoprolol tartrate (LOPRESSOR) 25 MG Tab Take 1 Tablet by mouth 2 times a day. 60 Tablet 2 6/17/2024 at 2100    DULoxetine (CYMBALTA) 30 MG Cap DR Particles TAKE 1 CAPSULE BY MOUTH EVERY DAY 90 Capsule 2 6/17/2024 at 2200    gabapentin (NEURONTIN) 100 MG Cap TAKE 1 CAPSULE BY MOUTH THREE TIMES DAILY (Patient taking differently: Take 100 mg by mouth 2 times a day.) 180 Capsule 2 6/17/2024 at 2200    sertraline (ZOLOFT) 25 MG tablet TAKE 1 TABLET BY MOUTH EVERY DAY 30 Tablet 11 6/17/2024 at 0830    lovastatin (MEVACOR) 20 MG Tab TAKE 1 TABLET BY MOUTH EVERY  Tablet 2 6/17/2024 at 2200    vitamin D (CHOLECALCIFEROL) 1000 Unit Tab Take 1,000 Units by mouth every day.   6/17/2024 at 0830    levothyroxine (SYNTHROID) 125 MCG Tab TAKE 1 TABLET BY MOUTH EVERY MORNING ON AN EMPTY STOMACH (Patient taking differently: Take 125 mcg by mouth every morning Monday through Friday.) 90 Tablet 3 6/17/2024 at AM    hydrochlorothiazide (MICROZIDE) 12.5 MG capsule Take 1 Capsule by  mouth every day. 90 Capsule 2 6/17/2024 at 2200    amLODIPine-Olmesartan 5-40 MG Tab Take 1 Tablet by mouth every day. 100 Tablet 2 6/17/2024 at 2200    levothyroxine (SYNTHROID) 137 MCG Tab TAKE 1 TABLET BY MOUTH EVERY MORNING EVERY SATURDAY AND SUNDAY (Patient taking differently: Take 137 mcg by mouth two times a week. Every Saturday and Sunday) 90 Tablet 3 6/16/2024 at AM     Current Facility-Administered Medications   Medication Dose Frequency Provider Last Rate Last Admin    spironolactone (Aldactone) tablet 25 mg  25 mg Q DAY Rashaad Hernandez M.D.   25 mg at 06/20/24 0909    acetaminophen (Tylenol) tablet 650 mg  650 mg Q4HRS PRN Rashaad Hernandez M.D.   650 mg at 06/19/24 1807    bisoprolol (Zebeta) tablet 2.5 mg  2.5 mg Q DAY Sydney Grove M.D.   2.5 mg at 06/20/24 0554    DULoxetine (Cymbalta) capsule 30 mg  30 mg DAILY Jorge Rodriguez M.D.   30 mg at 06/20/24 0554    gabapentin (Neurontin) capsule 100 mg  100 mg TID Jorge Rodriguez M.D.   100 mg at 06/20/24 1136    levothyroxine (Synthroid) tablet 125 mcg  125 mcg AM ES Jorge Rodriguez M.D.   125 mcg at 06/20/24 0554    lovastatin (Mevacor) tablet 20 mg  20 mg DAILY Jorge Rodriguez M.D.   20 mg at 06/20/24 0554    sertraline (Zoloft) tablet 25 mg  25 mg DAILY Jorge Rodriguez M.D.   25 mg at 06/20/24 0554    enoxaparin (Lovenox) inj 40 mg  40 mg DAILY AT 1800 Jorge Rodriguez M.D.   40 mg at 06/19/24 1723    furosemide (Lasix) injection 40 mg  40 mg BID DIURETIC Jorge Rodriguez M.D.   40 mg at 06/20/24 0554    olmesartan (Benicar) tablet 40 mg  40 mg Q EVENING Rigoberto Rivera M.D.   40 mg at 06/19/24 1723   Last reviewed on 6/18/2024 12:11 PM by Thomas Ferro R.N.     No Known Allergies      Intake/Output Summary (Last 24 hours) at 6/20/2024 1343  Last data filed at 6/20/2024 0914  Gross per 24 hour   Intake 240 ml   Output 900 ml   Net -660 ml        Physical Exam  Body mass index is 41.21 kg/m².  /69   Pulse 72   Temp 36.6 °C  "(97.9 °F) (Temporal)   Resp 18   Ht 1.575 m (5' 2\")   Wt 102 kg (225 lb 5 oz)   SpO2 92%   Vitals:    06/20/24 1139 06/20/24 1156 06/20/24 1203 06/20/24 1303   BP: 96/64 97/63 99/63 102/69   Pulse:  67 68 72   Resp: 18 18 18 18   Temp:   36.6 °C (97.9 °F)    TempSrc:   Temporal    SpO2: 93% 96% 92% 92%   Weight:       Height:         Oxygen Therapy:  Pulse Oximetry: 92 %, O2 (LPM): 3, O2 Delivery Device: Nasal Cannula    Physical Exam  Constitutional:       Appearance: Normal appearance. She is obese.   Neck:      Vascular: No JVD.   Cardiovascular:      Rate and Rhythm: Normal rate and regular rhythm.      Pulses: Normal pulses and intact distal pulses. No decreased pulses.      Heart sounds: S1 normal and S2 normal. No murmur heard.     No friction rub. No gallop.   Pulmonary:      Effort: Pulmonary effort is normal. No respiratory distress.      Breath sounds: Decreased breath sounds present. No wheezing or rales.   Musculoskeletal:      Cervical back: Neck supple.   Neurological:      Mental Status: She is alert and oriented to person, place, and time.         Labs (personally reviewed and notable for):   Lab Results   Component Value Date/Time    SODIUM 142 06/20/2024 01:07 AM    POTASSIUM 3.7 06/20/2024 01:07 AM    CHLORIDE 103 06/20/2024 01:07 AM    CO2 28 06/20/2024 01:07 AM    GLUCOSE 111 (H) 06/20/2024 01:07 AM    BUN 20 06/20/2024 01:07 AM    CREATININE 0.98 06/20/2024 01:07 AM    CREATININE 0.8 06/02/2007 09:35 AM    BUNCREATRAT 31 (H) 02/11/2022 08:48 AM      Lab Results   Component Value Date/Time    WBC 10.2 06/18/2024 04:51 AM    RBC 5.18 06/18/2024 04:51 AM    HEMOGLOBIN 14.4 06/18/2024 04:51 AM    HEMATOCRIT 47.2 (H) 06/18/2024 04:51 AM    MCV 91.1 06/18/2024 04:51 AM    MCH 27.8 06/18/2024 04:51 AM    MCHC 30.5 (L) 06/18/2024 04:51 AM    MPV 10.6 06/18/2024 04:51 AM    NEUTSPOLYS 67.00 06/18/2024 04:51 AM    LYMPHOCYTES 20.40 (L) 06/18/2024 04:51 AM    MONOCYTES 7.10 06/18/2024 04:51 AM    " EOSINOPHILS 4.00 2024 04:51 AM    BASOPHILS 1.10 2024 04:51 AM      Lab Results   Component Value Date/Time    CHOLSTRLTOT 149 2022 08:48 AM    CHOLSTRLTOT 148 2007 09:35 AM    LDL 89 2020 07:47 AM    LDL 93 2007 09:35 AM    HDL 36 (L) 2022 08:48 AM    HDL 35 (L) 2007 09:35 AM    TRIGLYCERIDE 77 2022 08:48 AM    TRIGLYCERIDE 99 2007 09:35 AM       Lab Results   Component Value Date/Time    TROPONINT 15 2024 0451     Lab Results   Component Value Date/Time    NTPROBNP 2692 (H) 2024 0451       Telemetry Reviewed (personally reviewed) sinus rhythm    Radiology test Review:  EC-ECHOCARDIOGRAM COMPLETE W/O CONT   Final Result      CT-CTA CHEST PULMONARY ARTERY W/ RECONS   Final Result         1.  No pulmonary embolus appreciated.   2.  Moderate layering bilateral pleural effusions.   3.  Pulmonary infiltrates, greatest in the upper lobes   4.  Perihilar consolidations, appearance favoring perihilar infiltrates   5.  Small hiatal hernia   6.  Irregular hepatic contour, appearance favoring changes of cirrhosis   7.  Atherosclerosis and atherosclerotic coronary artery disease.      DX-CHEST-PORTABLE (1 VIEW)   Final Result         1.  Pulmonary edema and/or infiltrates   2.  Cardiomegaly      CL-LEFT HEART CATHETERIZATION WITH POSSIBLE INTERVENTION    (Results Pending)       I personally reviewed all blood test results, EKG tracings and images of his cardiac testings.     Cath dated 2024:  Aorta: 111/65 mmHg  LVEDP: 26 mmHg  No significant pullback gradient across the aortic valve     Coronary Anatomy              Left Main: Normal              LAD:  40% stenosis in the mid segment              LCx: Normal              RCA: Minimal luminal irregularities involving the PLB                Supravalvular aortogram:  Normal caliber aorta, downward takeoff    Impression and Medical Decision Makin.  Nonischemic cardiomyopathy with LVEF of 25%, left  bundle branch block.  Continue with guideline directed medical therapy, will check for infiltrative pathology also.  - Check lambda kappa light chain ratio  - Check SPEP and UPEP  - Continue with Zebeta 2.5 mg p.o. daily  - Can change over to furosemide 40 mg p.o. twice daily tomorrow  - Continue with Benicar 40 mg p.o. nightly and spironolactone 25 mg p.o. daily  - Will add Farxiga 10 mg p.o. daily  - Patient has a follow-up appointment in heart failure clinic on 7/2/2024    2.  Mild nonobstructive coronary artery disease.  - Enteric-coated aspirin 81 mg p.o. daily  - Check fasting lipid profile, if increased, add a statin drug    Cardiology will sign off at this time, please call with any questions or concerns.    Sydney Grove MD  Cardiologist, [facility]    Please note that this dictation was created using voice recognition software. I have made every reasonable attempt to correct obvious errors, but it is possible there are errors of grammar and possibly content that I did not discover before finalizing the note.

## 2024-06-20 NOTE — HOSPITAL COURSE
Deloris Brandt is a 68 y.o. female who presented 6/18/2024 with very pleasant 68-year-old woman with history of hyperlipidemia, hypertension, hypothyroidism.  She presents due to shortness of breath.  Over the past 2 weeks she has had episodes of feeling very short of breath.  She has had orthopnea and has been awoken with shortness of breath several times.  She has also noticed dyspnea on exertion and decreased tolerance of ambulation/exertion.  Episodes of increased much more over the past few days prompting her to come into the ED.  Chest x-ray she was found to have pulmonary edema.  Troponin nonelevated but BNP elevated at 2692.  Had mildly elevated D-dimer at 0.83, CTA pending at time of admission, viral panel negative.  Was given ceftriaxone and azithromycin in ED.  She was requiring additional oxygen due to SpO2 of 86% on arrival.

## 2024-06-20 NOTE — ASSESSMENT & PLAN NOTE
6/19/2024  Echo  CONCLUSIONS  Severely reduced left ventricular systolic function.  Akinsis and thinning of the anterior, anteroseptal, inferoseptal and   apical wall segments compatible with prior LAD territory infarct.  Moderate eccentric mitral regurgitation - consider further assessement   if there is clinical concern for more significant mitral regurgitation.   Ejection fraction of 25%.    I discussed the case with Dr. Grove of cardiology.  Planning for left heart catheterization tomorrow.  N.p.o. after midnight.  Continue furosemide 40 mg IV twice daily  Metoprolol changed to bisoprolol.    Continue olmesartan 40 mg daily.  Continuous cardiac monitoring to force diuresis to monitor for arrhythmias.    6/20/2024  Left heart catheterization showing mild nonobstructive coronary artery disease.  Started on aspirin per cardiology.  UPEP and SPEP ordered per cardiology.  Farxiga started per cardiology  Continuing furosemide 40 mL IV twice daily and transitioning to 40 mg by mouth twice daily tomorrow  Continuous pulse oximetry monitoring during forced diuresis  Strict ins and outs and daily weights.  Monitor electrolytes closely

## 2024-06-20 NOTE — CARE PLAN
The patient is Stable - Low risk of patient condition declining or worsening    Shift Goals  Clinical Goals: NPO at midnight  Patient Goals: rest  Family Goals: LUCY    Progress made toward(s) clinical / shift goals:  Patient has been NPO since midnight for heart cath this morning.       Problem: Knowledge Deficit - Standard  Goal: Patient and family/care givers will demonstrate understanding of plan of care, disease process/condition, diagnostic tests and medications  Outcome: Progressing     Problem: Fall Risk  Goal: Patient will remain free from falls  Outcome: Progressing     Problem: Psychosocial  Goal: Patient's level of anxiety will decrease  Outcome: Progressing     Problem: Communication  Goal: The ability to communicate needs accurately and effectively will improve  Outcome: Progressing     Problem: Discharge Barriers/Planning  Goal: Patient's continuum of care needs are met  Outcome: Progressing     Problem: Respiratory  Goal: Patient will achieve/maintain optimum respiratory ventilation and gas exchange  Outcome: Progressing     Problem: Fluid Volume  Goal: Fluid volume balance will be maintained  Outcome: Progressing     Problem: Urinary Elimination  Goal: Establish and maintain regular urinary output  Outcome: Progressing     Problem: Bowel Elimination  Goal: Establish and maintain regular bowel function  Outcome: Progressing     Problem: Pain - Standard  Goal: Alleviation of pain or a reduction in pain to the patient’s comfort goal  Outcome: Progressing

## 2024-06-20 NOTE — DISCHARGE PLANNING
"TCN following. HTH/SCP chart reviewed. No new TCN needs identified. Please see prior TCN note from 6/19 for most recent discharge planning considerations if indicated. Anticipating dc to home with likely outpatient services and supplemental 02 if indicated given current review. Note that 6 clicks mobility remains at 18 and per review, pt ambulatory with no AD. Pt remains on 3L NC per review as well.    Completed:  Choice obtained: DEBORAH, MALACHI ([facility])  SCP with [facility]. Per prior TCN, \"discussed possible outpatient transitional PCP follow up if indicated and pt in agreement; sent information to assist in scheduling\"  "

## 2024-06-20 NOTE — PROGRESS NOTES
Report received from day shift RN, assumed patient care. Patient is calmly resting in bed, no signs of distress, even and unlabored breathing noted. Pt on 3 LPM NC O2. A&Ox4. Tele box on and in place. Patient has call light within reach, fall precautions in place. Patient states no needs at this time. Hourly rounding initiated.

## 2024-06-20 NOTE — CARE PLAN
The patient is Stable - Low risk of patient condition declining or worsening    Shift Goals  Clinical Goals: monitor labs and vitals, wean off O2  Patient Goals: rest  Family Goals: LUCY    Progress made toward(s) clinical / shift goals:  Pt had heart catheterization today. Right radial site intact with TR band removed and dressing in place. Pt still requiring 3L NC O2.   Problem: Knowledge Deficit - Standard  Goal: Patient and family/care givers will demonstrate understanding of plan of care, disease process/condition, diagnostic tests and medications  Outcome: Progressing     Problem: Respiratory  Goal: Patient will achieve/maintain optimum respiratory ventilation and gas exchange  Outcome: Progressing     Problem: Urinary Elimination  Goal: Establish and maintain regular urinary output  Outcome: Progressing     Problem: Care Map:  Day 3 Optimal Outcome for the Heart Failure Patient  Goal: Day 3:  Optimal Care of the heart failure patient  Outcome: Progressing       Patient is not progressing towards the following goals:

## 2024-06-20 NOTE — PROCEDURES
"CARDIAC CATHETERIZATION REPORT    Referring Provider: Sydney Grove M.D.    PROCEDURE PHYSICIAN: Polo Pablo MD, [facility], [facility]  ASSISTANT: None    IMPRESSIONS:  1.  Nonischemic cardiomyopathy  2.  Mild, nonobstructive two-vessel coronary disease  3.  Mild elevation LVEDP 26 mmHg    Recommendations:  Further recommendations per the rounding team    Pre-procedure diagnosis: New cardiomyopathy  Post-procedure diagnosis: NICM    Procedure performed  Selective coronary angiography  Left heart catheterization  Supravalvular aortography    Conscious sedation was supervised by myself and administered by trained personnel using fentanyl and versed between 1030 and 1048. The patient tolerated sedation without complication.     Procedure Description  1. Access: 5/6 [place] right radial artery Micropuncture technique was utilized following local anesthesia with lidocaine.  A radial cocktail containing verapamil and saline was administered in the radial artery sheath    2. Diagnostic description: The catheter was passed to the central circulation with the aide of J tipped 0.35\" wire. A 5F TIG 4.0, 6F Multipurpose, and 6F Pigtail were used to inject the coronary circulation  and enter the left ventricle during invasive hemodynamic monitoring  and inject the ascending aorta  ascending aortography performed in order to identify the origin of the right coronary artery .     3. Closing: At completion of the procedure the relevant equipment was removed from the body and hemostasis achieved by Radial band    Findings   Hemodynamics:   Aorta: 111/65 mmHg  LVEDP: 26 mmHg  No significant pullback gradient across the aortic valve    Coronary Anatomy   Left Main: Normal   LAD:  40% stenosis in the mid segment   LCx: Normal   RCA: Minimal luminal irregularities involving the PLB     Supravalvular aortogram:  Normal caliber aorta, downward takeoff    Technical Factors  1. Complications: None  2. Estimated Blood Loss: <50 cc  3. Specimens: " None  4. Contrast Volume: 70 ml  5. Medications: Radial cocktail (Verapamil 2.5 mg, Nitroglycerin 100 mcg) Heparin 5000 Units  6. Radiation (Air Kerma): 143 mGy

## 2024-06-20 NOTE — PROGRESS NOTES
[facility] Medicine Daily Progress Note    Date of Service  6/20/2024    Chief Complaint  Deloris Brandt is a 68 y.o. female admitted 6/18/2024 with shortness of breath.    Hospital Course  Deloris Brandt is a 68 y.o. female who presented 6/18/2024 with very pleasant 68-year-old woman with history of hyperlipidemia, hypertension, hypothyroidism.  She presents due to shortness of breath.  Over the past 2 weeks she has had episodes of feeling very short of breath.  She has had orthopnea and has been awoken with shortness of breath several times.  She has also noticed dyspnea on exertion and decreased tolerance of ambulation/exertion.  Episodes of increased much more over the past few days prompting her to come into the ED.  Chest x-ray she was found to have pulmonary edema.  Troponin nonelevated but BNP elevated at 2692.  Had mildly elevated D-dimer at 0.83, CTA pending at time of admission, viral panel negative.  Was given ceftriaxone and azithromycin in ED.  She was requiring additional oxygen due to SpO2 of 86% on arrival.     Interval Problem Update  6/19/2024  Patient was seen and examined on the telemetry floor.  Continues on continuous cardiac monitoring.  Shortness of breath is improved.  Cardiology consulted.  Discussed with Dr. Grove of cardiology.  Planning for left heart catheterization tomorrow.  Currently on 2 LPM oxygen by nasal cannula.  Baseline is room air.  Potassium 4.0.  Magnesium 1.8 replace.  Urine output of 2.7 L yesterday.  Ejection fraction of 25% on echocardiogram  Continuing on furosemide 40 mg IV twice daily.    6/20/2024  Patient was seen and examined on the telemetry floor.  Continues on continuous cardiac monitoring.  Underwent left heart catheterization, showing nonobstructive mild coronary artery disease.  Started on aspirin per cardiology.  Continuing on furosemide 40 mg IV twice daily today transitioning to 40 mg by mouth twice daily tomorrow.  Started on Farxiga 10 mg daily.  Continue  to require 3 LPM oxygen via nasal cannula      I have discussed this patient's plan of care and discharge plan at IDT rounds today with Case Management, Nursing, Nursing leadership, and other members of the IDT team.    Consultants/Specialty  cardiology    Code Status  Full Code    Disposition  The patient is not medically cleared for discharge to home or a post-acute facility.  Anticipate discharge to: home with close outpatient follow-up    I have placed the appropriate orders for post-discharge needs.    Review of Systems  Review of Systems   Constitutional:  Negative for chills and fever.   Respiratory:  Negative for cough and shortness of breath.    Cardiovascular:  Negative for chest pain and palpitations.   Gastrointestinal:  Negative for abdominal pain, nausea and vomiting.   Musculoskeletal:  Negative for joint pain and myalgias.   Neurological:  Negative for dizziness and headaches.        Physical Exam  Temp:  [36.1 °C (97 °F)-36.6 °C (97.9 °F)] 36.1 °C (97 °F)  Pulse:  [66-80] 72  Resp:  [18] 18  BP: ()/(59-69) 96/60  SpO2:  [90 %-98 %] 90 %    Physical Exam  Vitals and nursing note reviewed. Exam conducted with a chaperone present.   Constitutional:       Appearance: She is obese. She is not ill-appearing or diaphoretic.      Interventions: Nasal cannula in place.   HENT:      Head: Normocephalic and atraumatic.      Mouth/Throat:      Mouth: Mucous membranes are moist.      Pharynx: Oropharynx is clear. No oropharyngeal exudate.   Eyes:      General:         Right eye: No discharge.         Left eye: No discharge.      Conjunctiva/sclera: Conjunctivae normal.      Pupils: Pupils are equal, round, and reactive to light.   Cardiovascular:      Rate and Rhythm: Normal rate and regular rhythm.      Pulses: Normal pulses.      Heart sounds: Normal heart sounds. No murmur heard.  Pulmonary:      Effort: Pulmonary effort is normal. No respiratory distress.      Breath sounds: Normal breath sounds.    Abdominal:      General: Abdomen is flat. Bowel sounds are normal. There is no distension.      Palpations: Abdomen is soft.      Tenderness: There is no abdominal tenderness.   Musculoskeletal:      Cervical back: Neck supple. No tenderness.      Right lower le+ Pitting Edema present.      Left lower le+ Pitting Edema present.   Neurological:      Mental Status: She is alert and oriented to person, place, and time.      Motor: No weakness.   Psychiatric:         Thought Content: Thought content normal.         Judgment: Judgment normal.         Fluids    Intake/Output Summary (Last 24 hours) at 2024 191  Last data filed at 2024 1820  Gross per 24 hour   Intake 240 ml   Output 1100 ml   Net -860 ml       Laboratory  Recent Labs     24  0451   WBC 10.2   RBC 5.18   HEMOGLOBIN 14.4   HEMATOCRIT 47.2*   MCV 91.1   MCH 27.8   MCHC 30.5*   RDW 46.1   PLATELETCT 365   MPV 10.6     Recent Labs     24  0451 24  0259 24  0107   SODIUM 145 142 142   POTASSIUM 4.2 4.0 3.7   CHLORIDE 108 101 103   CO2 22 28 28   GLUCOSE 112* 100* 111*   BUN 12 11 20   CREATININE 0.82 0.80 0.98   CALCIUM 9.5 9.0 8.8                   Imaging  EC-ECHOCARDIOGRAM COMPLETE W/O CONT   Final Result      CT-CTA CHEST PULMONARY ARTERY W/ RECONS   Final Result         1.  No pulmonary embolus appreciated.   2.  Moderate layering bilateral pleural effusions.   3.  Pulmonary infiltrates, greatest in the upper lobes   4.  Perihilar consolidations, appearance favoring perihilar infiltrates   5.  Small hiatal hernia   6.  Irregular hepatic contour, appearance favoring changes of cirrhosis   7.  Atherosclerosis and atherosclerotic coronary artery disease.      DX-CHEST-PORTABLE (1 VIEW)   Final Result         1.  Pulmonary edema and/or infiltrates   2.  Cardiomegaly      CL-LEFT HEART CATHETERIZATION WITH POSSIBLE INTERVENTION    (Results Pending)   DX-CHEST-PORTABLE (1 VIEW)    (Results Pending)         Assessment/Plan  * Acute systolic (congestive) heart failure (HCC)- (present on admission)  Assessment & Plan  6/19/2024  Echo  CONCLUSIONS  Severely reduced left ventricular systolic function.  Akinsis and thinning of the anterior, anteroseptal, inferoseptal and   apical wall segments compatible with prior LAD territory infarct.  Moderate eccentric mitral regurgitation - consider further assessement   if there is clinical concern for more significant mitral regurgitation.   Ejection fraction of 25%.    I discussed the case with Dr. Grove of cardiology.  Planning for left heart catheterization tomorrow.  N.p.o. after midnight.  Continue furosemide 40 mg IV twice daily  Metoprolol changed to bisoprolol.    Continue olmesartan 40 mg daily.  Continuous cardiac monitoring to force diuresis to monitor for arrhythmias.    6/20/2024  Left heart catheterization showing mild nonobstructive coronary artery disease.  Started on aspirin per cardiology.  UPEP and SPEP ordered per cardiology.  Farxiga started per cardiology  Continuing furosemide 40 mL IV twice daily and transitioning to 40 mg by mouth twice daily tomorrow  Continuous pulse oximetry monitoring during forced diuresis  Strict ins and outs and daily weights.  Monitor electrolytes closely    Hypomagnesemia- (present on admission)  Assessment & Plan  6/19/2024  Magnesium level 1.8.  Have ordered magnesium sulfate 2 g IV for goal greater than 2.    6/20/2024  Resolved    Abnormal EKG  Assessment & Plan  EKG read as atrial fibrillation which patient does not have existing diagnosis of, I am able to appreciate P waves, will watch on telemetry and repeat EKG to more definitively rule out A-fib  Optimize electrolytes    Acute respiratory failure with hypoxia (HCC)- (present on admission)  Assessment & Plan  Increasing shortness of breath over the last 2 weeks with dyspnea on exertion and orthopnea  Pulmonary edema on chest x-ray  Elevated BNP  Suspect likely heart  failure as etiology  Ordered echocardiogram  Diuresis  Possible pneumonia but no cough and more subacute presentation, was given ceftriaxone and azithromycin, checking procalcitonin and holding off on further antibiotics at this time, continue to reevaluate need for antibiotics.  Watch on telemetry  Wean from oxygen as tolerated  Optimize electrolytes    6/19/2024  Improving to 2 LPM.  Currently.  Baseline is room air.  Secondary to acute systolic heart failure.  Continue IV forced diuresis  PEP therapy and incentive spirometry.  Continuous pulse oximetry monitoring    6/20/2024  Continue to require 3 LPM oxygen by nasal cannula  Repeat chest x-ray  Continue IV forced diuresis  PEP therapy and incentive spirometry.  Continuous pulse oximetry monitoring    Essential hypertension- (present on admission)  Assessment & Plan  Continue amlodipine    Mixed hyperlipidemia- (present on admission)  Assessment & Plan  Continue lovastatin    6/20/2024  Follow lipid panel    Hypothyroid- (present on admission)  Assessment & Plan  Continue levothyroxine         VTE prophylaxis:    enoxaparin ppx      I have performed a physical exam and reviewed and updated ROS and Plan today (6/20/2024). In review of yesterday's note (6/19/2024), there are no changes except as documented above.

## 2024-06-21 VITALS
SYSTOLIC BLOOD PRESSURE: 113 MMHG | HEART RATE: 92 BPM | WEIGHT: 219.36 LBS | HEIGHT: 62 IN | BODY MASS INDEX: 40.37 KG/M2 | OXYGEN SATURATION: 94 % | DIASTOLIC BLOOD PRESSURE: 77 MMHG | RESPIRATION RATE: 20 BRPM | TEMPERATURE: 97.5 F

## 2024-06-21 PROBLEM — E83.42 HYPOMAGNESEMIA: Status: RESOLVED | Noted: 2024-06-19 | Resolved: 2024-06-21

## 2024-06-21 PROBLEM — K74.60 CIRRHOSIS OF LIVER (HCC): Status: ACTIVE | Noted: 2024-06-21

## 2024-06-21 LAB
ANION GAP SERPL CALC-SCNC: 12 MMOL/L (ref 7–16)
BUN SERPL-MCNC: 23 MG/DL (ref 8–22)
CALCIUM SERPL-MCNC: 9.1 MG/DL (ref 8.5–10.5)
CHLORIDE SERPL-SCNC: 102 MMOL/L (ref 96–112)
CHOLEST SERPL-MCNC: 142 MG/DL (ref 100–199)
CO2 SERPL-SCNC: 32 MMOL/L (ref 20–33)
CREAT SERPL-MCNC: 0.9 MG/DL (ref 0.5–1.4)
GFR SERPLBLD CREATININE-BSD FMLA CKD-EPI: 69 ML/MIN/1.73 M 2
GLUCOSE SERPL-MCNC: 102 MG/DL (ref 65–99)
HDLC SERPL-MCNC: 29 MG/DL
LDLC SERPL CALC-MCNC: 85 MG/DL
MAGNESIUM SERPL-MCNC: 2.4 MG/DL (ref 1.5–2.5)
POTASSIUM SERPL-SCNC: 3.2 MMOL/L (ref 3.6–5.5)
SODIUM SERPL-SCNC: 146 MMOL/L (ref 135–145)
T4 FREE SERPL-MCNC: 1.53 NG/DL (ref 0.93–1.7)
TRIGL SERPL-MCNC: 142 MG/DL (ref 0–149)
TSH SERPL DL<=0.005 MIU/L-ACNC: 0.11 UIU/ML (ref 0.38–5.33)

## 2024-06-21 PROCEDURE — A9270 NON-COVERED ITEM OR SERVICE: HCPCS | Performed by: STUDENT IN AN ORGANIZED HEALTH CARE EDUCATION/TRAINING PROGRAM

## 2024-06-21 PROCEDURE — 700102 HCHG RX REV CODE 250 W/ 637 OVERRIDE(OP): Performed by: STUDENT IN AN ORGANIZED HEALTH CARE EDUCATION/TRAINING PROGRAM

## 2024-06-21 PROCEDURE — A9270 NON-COVERED ITEM OR SERVICE: HCPCS | Performed by: INTERNAL MEDICINE

## 2024-06-21 PROCEDURE — 80061 LIPID PANEL: CPT

## 2024-06-21 PROCEDURE — 84439 ASSAY OF FREE THYROXINE: CPT

## 2024-06-21 PROCEDURE — A9270 NON-COVERED ITEM OR SERVICE: HCPCS | Mod: JZ

## 2024-06-21 PROCEDURE — 83735 ASSAY OF MAGNESIUM: CPT

## 2024-06-21 PROCEDURE — 97163 PT EVAL HIGH COMPLEX 45 MIN: CPT

## 2024-06-21 PROCEDURE — 80048 BASIC METABOLIC PNL TOTAL CA: CPT

## 2024-06-21 PROCEDURE — 84443 ASSAY THYROID STIM HORMONE: CPT

## 2024-06-21 PROCEDURE — 700102 HCHG RX REV CODE 250 W/ 637 OVERRIDE(OP): Mod: JZ

## 2024-06-21 PROCEDURE — 97535 SELF CARE MNGMENT TRAINING: CPT

## 2024-06-21 PROCEDURE — 99239 HOSP IP/OBS DSCHRG MGMT >30: CPT | Performed by: STUDENT IN AN ORGANIZED HEALTH CARE EDUCATION/TRAINING PROGRAM

## 2024-06-21 PROCEDURE — 700102 HCHG RX REV CODE 250 W/ 637 OVERRIDE(OP): Performed by: INTERNAL MEDICINE

## 2024-06-21 RX ORDER — ASPIRIN 81 MG/1
81 TABLET ORAL DAILY
Qty: 100 TABLET | Refills: 1 | Status: SHIPPED | OUTPATIENT
Start: 2024-06-22

## 2024-06-21 RX ORDER — BISOPROLOL FUMARATE 5 MG/1
2.5 TABLET, FILM COATED ORAL DAILY
Qty: 30 TABLET | Refills: 1 | Status: SHIPPED | OUTPATIENT
Start: 2024-06-22 | End: 2024-07-02

## 2024-06-21 RX ORDER — POTASSIUM CHLORIDE 20 MEQ/1
20 TABLET, EXTENDED RELEASE ORAL DAILY
Qty: 30 TABLET | Refills: 1 | Status: CANCELLED | OUTPATIENT
Start: 2024-06-21

## 2024-06-21 RX ORDER — POTASSIUM CHLORIDE 20 MEQ/1
40 TABLET, EXTENDED RELEASE ORAL ONCE
Status: COMPLETED | OUTPATIENT
Start: 2024-06-21 | End: 2024-06-21

## 2024-06-21 RX ORDER — OLMESARTAN MEDOXOMIL 40 MG/1
40 TABLET ORAL EVERY EVENING
Qty: 30 TABLET | Refills: 1 | Status: SHIPPED | OUTPATIENT
Start: 2024-06-21 | End: 2024-07-02

## 2024-06-21 RX ORDER — FUROSEMIDE 40 MG/1
40 TABLET ORAL 2 TIMES DAILY
Qty: 60 TABLET | Refills: 1 | Status: SHIPPED | OUTPATIENT
Start: 2024-06-21 | End: 2024-07-02 | Stop reason: SDUPTHER

## 2024-06-21 RX ORDER — SPIRONOLACTONE 25 MG/1
25 TABLET ORAL DAILY
Qty: 30 TABLET | Refills: 1 | Status: SHIPPED | OUTPATIENT
Start: 2024-06-22 | End: 2024-07-02 | Stop reason: SDUPTHER

## 2024-06-21 RX ORDER — DAPAGLIFLOZIN 10 MG/1
10 TABLET, FILM COATED ORAL DAILY
Qty: 30 TABLET | Refills: 1 | Status: SHIPPED | OUTPATIENT
Start: 2024-06-22 | End: 2024-07-02 | Stop reason: SDUPTHER

## 2024-06-21 RX ORDER — POTASSIUM CHLORIDE 20 MEQ/1
40 TABLET, EXTENDED RELEASE ORAL EVERY 6 HOURS
Status: DISCONTINUED | OUTPATIENT
Start: 2024-06-21 | End: 2024-06-21

## 2024-06-21 RX ORDER — LEVOTHYROXINE SODIUM 88 UG/1
88 TABLET ORAL
Qty: 30 TABLET | Refills: 1 | Status: SHIPPED | OUTPATIENT
Start: 2024-06-21 | End: 2024-06-26 | Stop reason: SDUPTHER

## 2024-06-21 RX ADMIN — ASPIRIN 81 MG: 81 TABLET, COATED ORAL at 05:46

## 2024-06-21 RX ADMIN — DULOXETINE HYDROCHLORIDE 30 MG: 30 CAPSULE, DELAYED RELEASE ORAL at 05:45

## 2024-06-21 RX ADMIN — DAPAGLIFLOZIN 10 MG: 10 TABLET, FILM COATED ORAL at 05:46

## 2024-06-21 RX ADMIN — GABAPENTIN 100 MG: 100 CAPSULE ORAL at 05:46

## 2024-06-21 RX ADMIN — SPIRONOLACTONE 25 MG: 25 TABLET ORAL at 05:46

## 2024-06-21 RX ADMIN — LOVASTATIN 20 MG: 20 TABLET ORAL at 05:45

## 2024-06-21 RX ADMIN — POTASSIUM CHLORIDE 40 MEQ: 1500 TABLET, EXTENDED RELEASE ORAL at 04:31

## 2024-06-21 RX ADMIN — LEVOTHYROXINE SODIUM 125 MCG: 0.12 TABLET ORAL at 05:46

## 2024-06-21 RX ADMIN — GABAPENTIN 100 MG: 100 CAPSULE ORAL at 13:37

## 2024-06-21 RX ADMIN — SERTRALINE HYDROCHLORIDE 25 MG: 50 TABLET ORAL at 05:46

## 2024-06-21 RX ADMIN — FUROSEMIDE 40 MG: 40 TABLET ORAL at 05:46

## 2024-06-21 ASSESSMENT — COGNITIVE AND FUNCTIONAL STATUS - GENERAL
MOVING TO AND FROM BED TO CHAIR: A LITTLE
MOBILITY SCORE: 20
WALKING IN HOSPITAL ROOM: A LITTLE
STANDING UP FROM CHAIR USING ARMS: A LITTLE
SUGGESTED CMS G CODE MODIFIER MOBILITY: CJ
CLIMB 3 TO 5 STEPS WITH RAILING: A LITTLE

## 2024-06-21 ASSESSMENT — GAIT ASSESSMENTS
GAIT LEVEL OF ASSIST: SUPERVISED
DEVIATION: BRADYKINETIC;SHUFFLED GAIT;INCREASED BASE OF SUPPORT
DISTANCE (FEET): 150

## 2024-06-21 ASSESSMENT — FIBROSIS 4 INDEX: FIB4 SCORE: 0.93

## 2024-06-21 ASSESSMENT — PATIENT HEALTH QUESTIONNAIRE - PHQ9
1. LITTLE INTEREST OR PLEASURE IN DOING THINGS: NOT AT ALL
2. FEELING DOWN, DEPRESSED, IRRITABLE, OR HOPELESS: NOT AT ALL
SUM OF ALL RESPONSES TO PHQ9 QUESTIONS 1 AND 2: 0

## 2024-06-21 ASSESSMENT — PAIN DESCRIPTION - PAIN TYPE: TYPE: ACUTE PAIN;CHRONIC PAIN

## 2024-06-21 NOTE — DISCHARGE PLANNING
Case Management Discharge Planning    Admission Date: 6/18/2024  GMLOS: 3.6  ALOS: 3    6-Clicks ADL Score: 20  6-Clicks Mobility Score: 18      Anticipated Discharge Dispo: Discharge Disposition: Discharged to home/self care (01)    DME Needed: Yes    DME Ordered: Yes  Home Oxygen    Action(s) Taken: Updated Provider/Nurse on Discharge Plan, DC Assessment Complete (See below), Choice obtained, and Referral(s) sent    1015, IMM provided and explained to Pt.    Escalations Completed: None    Medically Clear: Yes    Next Steps: CM will continue to assist Pt with discharge needs.      Barriers to Discharge: Oxygen Delivery      Care Transition Team Assessment  RN JACKELIN met with Pt at bedside to obtain assessment informations. Demographics verified. RN JACKELIN introduced self and purpose of visit.   Pt lives with children in a 1 story house with a ramp.  Pt has  children and family members for support.  Pt has JOANA SAAVEDRA* for PCP  Pt was independent with ADLs prior to hospitalization.        Information Source  Orientation Level: Oriented X4  Information Given By: Patient  Who is responsible for making decisions for patient? : Patient    Elopement Risk  Legal Hold: (P) No  Ambulatory or Self Mobile in Wheelchair: (P) Yes  Disoriented: (P) No  Psychiatric Symptoms: (P) None  History of Wandering: (P) No  Elopement this Admit: (P) No  Vocalizing Wanting to Leave: (P) No  Displays Behaviors, Body Language Wanting to Leave: (P) No-Not at Risk for Elopement  Elopement Risk: (P) Not at Risk for Elopement    Interdisciplinary Discharge Planning  Primary Care Physician: JOANA SAAVEDRA*  Lives with - Patient's Self Care Capacity: Adult Children  Patient or legal guardian wants to designate a caregiver: No  Support Systems: Children, Family Member(s)  Housing / Facility: 1 Story House (with a ramp to main entrance)  Durable Medical Equipment: Other - Specify (Sometimes uses cane)    Discharge Preparedness  What is  your plan after discharge?: Home with help  What are your discharge supports?: Child  Prior Functional Level: Ambulatory, Independent with Activities of Daily Living, Independent with Medication Management    Functional Assesment  Prior Functional Level: Ambulatory, Independent with Activities of Daily Living, Independent with Medication Management    Finances  Financial Barriers to Discharge: No  Prescription Coverage: Yes    Vision / Hearing Impairment  Vision Impairment : Yes  Right Eye Vision: (P) Impaired, Wears Glasses  Left Eye Vision: (P) Impaired, Wears Glasses  Hearing Impairment : No  Hearing Impairment: (P) Both Ears, Hearing Device Not Available       Domestic Abuse  Have you ever been the victim of abuse or violence?: No  Possible Abuse/Neglect Reported to:: Not Applicable    Psychological Assessment  History of Substance Abuse: None  History of Psychiatric Problems: No         Anticipated Discharge Information  Discharge Disposition: Discharged to home/self care (01)

## 2024-06-21 NOTE — DISCHARGE PLANNING
1218  LUIS ANGEL sent O2 orders to [facility] @1218 per choice form obtained from media.     [id number]  Agency/Facility Name: [facility]   Spoke To: Leslie   Outcome: LUIS ANGEL called regarding O2 order, per Leslie O2 has been delivered at bedside. JACKELIN Cotton notified.

## 2024-06-21 NOTE — THERAPY
"Physical Therapy   Initial Evaluation     Patient Name: Deloris Brandt  Age:  68 y.o., Sex:  female  Medical Record #: [id number]  Today's Date: 6/21/2024     Precautions  Precautions: Fall Risk;Cardiac Precautions (See Comments)    Assessment  Patient is 68 y.o. female admitted with SOB, dyspnea on exertion; found pulmonary edema, acute heart failure and acute respiratory failure with hypoxia, left bundle branch block, cardiomyopathy with EF 25%. S/p heart cath on 6/20 with no intervention, nonobstructive CAD. Pt educated on self management, compensatory strategies, activity pacing and progression, energy conservation, talk test, SpO2 and BP monitoring; receptive.  Did require cues for O2 tubing management and safety as pt new to supplemental O2. Recommend home with no needs as pt ambulating hallway distances SPV, improved from her recent baseline. Will d/c PT as pt with no further acute IP PT needs.    Plan    Physical Therapy Initial Treatment Plan   Duration: Evaluation only    DC Equipment Recommendations: None  Discharge Recommendations: Anticipate that the patient will have no further physical therapy needs after discharge from the hospital       Subjective    \"I haven't really gone anywhere recently because of the shortness of breath.\"      Objective     06/21/24 0828   Vitals   Pulse 92   Patient BP Position   (sitting after ambulation)   Blood Pressure  113/77   O2 (LPM) 3   O2 Delivery Device Silicone Nasal Cannula   Vitals Comments New o2 needs, on RA during day at baseline. SpO2 88% upon returning to room after ambulation, returned to 94% with seated rest 2 min   Pain 0 - 10 Group   Therapist Pain Assessment Post Activity Pain Same as Prior to Activity;Nurse Notified;0   Prior Living Situation   Housing / Facility 1 Story House   Steps Into Home   (ramp)   Equipment Owned Single Point Cane;Front-Wheel Walker   Lives with - Patient's Self Care Capacity Adult Children   Comments lives with Sons who can " assist if needed   Prior Level of Functional Mobility   Bed Mobility Independent   Transfer Status Independent   Ambulation Independent   Ambulation Distance   (mainly household distances recently due to SOB)   Assistive Devices Used Single Point Cane   Stairs Independent   Cognition    Cognition / Consciousness X   Level of Consciousness Alert   Safety Awareness Impaired   Comments Pleasant and cooperative. Impaired awareness of O2 tubing and management of lines   Strength Upper Body   Upper Body Strength  WDL   Active ROM Lower Body    Active ROM Lower Body  WDL   Strength Lower Body   Comments BLE grossly 4/5   Balance Assessment   Sitting Balance (Static) Fair +   Sitting Balance (Dynamic) Fair   Standing Balance (Static) Fair   Standing Balance (Dynamic) Fair   Weight Shift Sitting Fair   Weight Shift Standing Fair   Comments no AD or LOB   Bed Mobility    Supine to Sit Modified Independent   Sit to Supine Modified Independent   Scooting Modified Independent   Rolling Modified Independent   Gait Analysis   Gait Level Of Assist Supervised   Assistive Device None   Distance (Feet) 150   # of Times Distance was Traveled 1   Deviation Bradykinetic;Shuffled Gait;Increased Base Of Support  (lateral trunk sway)   Comments negative talk test, denies SOB   Functional Mobility   Sit to Stand Supervised   Bed, Chair, Wheelchair Transfer Supervised   Transfer Method Stand Step   Mobility no AD in hallway, return supine   6 Clicks Assessment - How much HELP from from another person do you currently need... (If the patient hasn't done an activity recently, how much help from another person do you think he/she would need if he/she tried?)   Turning from your back to your side while in a flat bed without using bedrails? 4   Moving from lying on your back to sitting on the side of a flat bed without using bedrails? 4   Moving to and from a bed to a chair (including a wheelchair)? 3   Standing up from a chair using your arms  (e.g., wheelchair, or bedside chair)? 3   Walking in hospital room? 3   Climbing 3-5 steps with a railing? 3   6 clicks Mobility Score 20   Activity Tolerance   Comments no overt deficits, reports improved   Patient / Family Goals    Patient / Family Goal #1 to go home   Education Group   Education Provided Role of Physical Therapist;Cardiac Precautions   Cardiac Precautions Patient Response Patient;Acceptance;Explanation;Verbal Demonstration   Role of Physical Therapist Patient Response Patient;Acceptance;Explanation;Action Demonstration   Additional Comments Receptive to self management, compensatory strategies, activity pacing and progression, energy conservation, talk test, SpO2 and BP monitoring   Physical Therapy Initial Treatment Plan    Duration Evaluation only   Problem List    Problems Decreased Activity Tolerance;Safety Awareness Deficits / Cognition   Anticipated Discharge Equipment and Recommendations   DC Equipment Recommendations None   Discharge Recommendations Anticipate that the patient will have no further physical therapy needs after discharge from the hospital   Interdisciplinary Plan of Care Collaboration   IDT Collaboration with  Nursing   Patient Position at End of Therapy In Bed;Bed Alarm On;Call Light within Reach;Tray Table within Reach;Phone within Reach   Collaboration Comments RN updated   Session Information   Date / Session Number  6/21: eval only, d/c PT

## 2024-06-21 NOTE — PROGRESS NOTES
Monitor Summary:     Rhythm: SR  Rate: 74-92  Ectopy: 0  Measurements: 0.14/0.11/0.54           12 Hour Chart Check

## 2024-06-21 NOTE — FACE TO FACE
"Face to Face Note  -  Durable Medical Equipment    Rashaad Hernandez M.D. - NPI: [id number]  I certify that this patient is under my care and that they had a durable medical equipment(DME)face to face encounter by myself that meets the physician DME face-to-face encounter requirements with this patient on:    Date of encounter:   Patient:                    MRN:                       YOB: 2026  Deloris Brandt  [id number]  1955     The encounter with the patient was in whole, or in part, for the following medical condition, which is the primary reason for durable medical equipment:  CHF and Other - hypoxia    I certify that, based on my findings, the following durable medical equipment is medically necessary:    Oxygen   HOME O2 Saturation Measurements:(Values must be present for Home Oxygen orders)  Room air sat at rest: 86  Room air sat with amb: 84  With liters of O2: 2, O2 sat at rest with O2: 93  With Liters of O2: 2, O2 sat with amb with O2 : 93  Is the patient mobile?: Yes  If patient feels more short of breath, they can go up to 6 liters per minute and contact healthcare provider.    Supporting Symptoms: The patient requires supplemental oxygen, as the following interventions have been tried with limited or no improvement: \"Ambulation with oximetry and \"Incentive spirometry.    My Clinical findings support the need for the above equipment due to:  Hypoxia  "

## 2024-06-21 NOTE — DISCHARGE SUMMARY
Discharge Summary    CHIEF COMPLAINT ON ADMISSION  Chief Complaint   Patient presents with    Shortness of Breath     Pt reports shortness of breath at home that has increased over the last week. Pt also reports a cough with chest pressure x1 week.  Pt was 88% RA in triage, placed on 2L NC.        Reason for Admission  Acute respiratory failure with hypoxia due to acute systolic heart failure    Admission Date  6/18/2024    CODE STATUS  Full Code    HPI & HOSPITAL COURSE  Deloris Brandt is a 68 y.o. female who presented 6/18/2024 with shortness of breath.  Is a pleasant woman with a history of hyperlipidemia, hypertension, and hypothyroidism.  Over the past 2 weeks, patient reported episodes of shortness of breath as well as orthopnea waking her up from sleep.  She had worsening dyspnea on exertion with decreased tolerance of ambulation.    In the emergency room patient was found to have a normal troponin T level.  NT-proBNP was elevated at 2692.  Chest x-ray showed pulmonary edema.  D-dimer only mildly elevated 0.83.  CT angiogram of the chest was negative for pulmonary embolism.  Moderate layering of bilateral pleural effusions.  Pulmonary) greater in the upper lobes.  Small hiatal hernia with very irregular hepatic contour appearance favoring changes of cirrhosis.  Atherosclerosis and atherosclerotic coronary disease.    Patient was noted to be hypoxic down to 86% on room air on arrival requiring supplemental oxygen.  Viral panel testing was negative.  Patient was started on ceftriaxone and azithromycin in the emergency room.  Echocardiogram showed ejection fraction of 25% with severely reduced left ventricular systolic function.  Akinesis and thinning of the anterior, anterolateral, and inferior lateral, and apical wall segments compatible with prior LAD territory infarct.  Moderate concentric mitral regurgitation.  Cardiology was consulted and the patient underwent a left heart catheterization with Dr. Cuellar  Bev on 6/20/2024, which showed mild nonobstructive two-vessel coronary artery disease, nonischemic cardiomyopathy with mildly elevated left ventricular end-diastolic pressure of 26 mmHg.    The etiology of patient's nonischemic cardiomyopathy was unclear.  SPEP, UPEP, and lambda and kappa light chain ratio was ordered for further evaluation.  Patient was started on heart failure medications and continued on IV forced diuresis with furosemide 40 mg IV twice daily with improvement of her shortness of breath.  However, she continued to require supplemental oxygen and requested to be discharged to home with home oxygen.    Notably, patient's admitting weight was 102 kg (225 pounds). Weight on date of discharge was 99.5 kg) 219 pounds).  It was felt that the patient had not reached her dry weight.  Again however, the patient was discharged to home with home oxygen and oral diuretics as per her request.    Patient was TSH was low at 0.11 with a free T4 of 1.53.  Patient's levothyroxine dosing was appropriately decreased for TSH goal of 4-6 given her age group.    Therefore, she is discharged in fair and stable condition to home with close outpatient follow-up.    The patient met 2-midnight criteria for an inpatient stay at the time of discharge.    Discharge Date  6/21/2024    FOLLOW UP ITEMS POST DISCHARGE  -Follow-up with primary care provider in 3 to 5 days.  Follow-up multiple myeloma workup.  [facility]    DISCHARGE DIAGNOSES  Principal Problem:    Acute systolic (congestive) heart failure (HCC) (POA: Yes)  Active Problems:    Hypothyroid (POA: Yes)    Mixed hyperlipidemia (POA: Yes)    Essential hypertension (POA: Yes)    Acute respiratory failure with hypoxia (HCC) (POA: Yes)    Abnormal EKG (POA: Yes)    Cirrhosis of liver (HCC) (POA: Yes)  Resolved Problems:    Hypomagnesemia (POA: Yes)      FOLLOW UP  Future Appointments   Date Time Provider Department Center   6/26/2024  3:00 PM  Rj Hale M.D. [address]   7/2/2024  1:45 PM KARISSA Hill None   8/27/2024  1:30 PM MINNIE Holguin None   9/23/2024  3:15 PM [facility] EXAM 9 E[facility]  [address]  [address]  [place] [id number] [phone]  Call  Your doctor has referred you for Cardiac Rehab which is important in your recovery. Please call to make an appointment.    Zahra Paz M.D.  [address]  [facility] [address]  [place] [id number] [phone]    Follow up in 3 day(s)        MEDICATIONS ON DISCHARGE     Medication List        START taking these medications        Instructions   aspirin 81 MG EC tablet  Start taking on: June 22, 2024  Replaces: aspirin 325 MG Tabs   Take 1 Tablet by mouth every day.  Dose: 81 mg     bisoprolol 5 MG Tabs  Start taking on: June 22, 2024  Commonly known as: Zebeta   Take 0.5 Tablets by mouth every day.  Dose: 2.5 mg     dapagliflozin propanediol 10 MG Tabs  Start taking on: June 22, 2024  Commonly known as: Farxiga   Take 1 Tablet by mouth every day.  Dose: 10 mg     furosemide 40 MG Tabs  Commonly known as: Lasix   Take 1 Tablet by mouth 2 times a day.  Dose: 40 mg     olmesartan 40 MG Tabs  Commonly known as: Benicar   Take 1 Tablet by mouth every evening.  Dose: 40 mg     spironolactone 25 MG Tabs  Start taking on: June 22, 2024  Commonly known as: Aldactone   Take 1 Tablet by mouth every day.  Dose: 25 mg            CHANGE how you take these medications        Instructions   levothyroxine 88 MCG Tabs  What changed:   medication strength  See the new instructions.  Another medication with the same name was removed. Continue taking this medication, and follow the directions you see here.  Commonly known as: Synthroid   Take 1 Tablet by mouth every morning on an empty stomach.  Dose: 88 mcg            CONTINUE taking these medications        Instructions   DULoxetine 30 MG Cpep  Commonly known as:  Cymbalta   TAKE 1 CAPSULE BY MOUTH EVERY DAY  Dose: 30 mg     fluticasone 50 MCG/ACT nasal spray  Commonly known as: Flonase   Administer 1 Spray into affected nostril(S) 1 time a day as needed.  Dose: 1 Spray     gabapentin 100 MG Caps  Commonly known as: Neurontin   TAKE 1 CAPSULE BY MOUTH THREE TIMES DAILY  Dose: 100 mg     lovastatin 20 MG Tabs  Commonly known as: Mevacor   TAKE 1 TABLET BY MOUTH EVERY DAY  Dose: 20 mg     NUTRA-SUPPORT BONE PO   Take 1 Capsule by mouth every day.  Dose: 1 Capsule     sertraline 25 MG tablet  Commonly known as: Zoloft   TAKE 1 TABLET BY MOUTH EVERY DAY  Dose: 25 mg     vitamin D 1000 Unit Tabs  Commonly known as: Cholecalciferol   Take 1,000 Units by mouth every day.  Dose: 1,000 Units            STOP taking these medications      amLODIPine-Olmesartan 5-40 MG Tabs     aspirin 325 MG Tabs  Commonly known as: Asa  Replaced by: aspirin 81 MG EC tablet     hydrochlorothiazide 12.5 MG capsule  Commonly known as: Microzide     metoprolol tartrate 25 MG Tabs  Commonly known as: Lopressor              Allergies  No Known Allergies    DIET  Orders Placed This Encounter   Procedures    Diet Order Diet: Cardiac     Standing Status:   Standing     Number of Occurrences:   1     Order Specific Question:   Diet:     Answer:   Cardiac [6]       ACTIVITY  As tolerated.  Weight bearing as tolerated    CONSULTATIONS  Cardiology    PROCEDURES    Referring Provider: Sydney Grove M.D.     PROCEDURE PHYSICIAN: Polo Pablo MD, [facility], [facility]  ASSISTANT: None     IMPRESSIONS:  1.  Nonischemic cardiomyopathy  2.  Mild, nonobstructive two-vessel coronary disease  3.  Mild elevation LVEDP 26 mmHg     Recommendations:  Further recommendations per the rounding team     Pre-procedure diagnosis: New cardiomyopathy  Post-procedure diagnosis: NICM     Procedure performed  Selective coronary angiography  Left heart catheterization  Supravalvular aortography     Conscious sedation was supervised by myself and  "administered by trained personnel using fentanyl and versed between 1030 and 1048. The patient tolerated sedation without complication.      Procedure Description  1. Access: 5/6 Ni[place]an right radial artery Micropuncture technique was utilized following local anesthesia with lidocaine.  A radial cocktail containing verapamil and saline was administered in the radial artery sheath     2. Diagnostic description: The catheter was passed to the central circulation with the aide of J tipped 0.35\" wire. A 5F TIG 4.0, 6F Multipurpose, and 6F Pigtail were used to inject the coronary circulation  and enter the left ventricle during invasive hemodynamic monitoring  and inject the ascending aorta  ascending aortography performed in order to identify the origin of the right coronary artery .      3. Closing: At completion of the procedure the relevant equipment was removed from the body and hemostasis achieved by Radial band     Findings   Hemodynamics:   Aorta: 111/65 mmHg  LVEDP: 26 mmHg  No significant pullback gradient across the aortic valve     Coronary Anatomy              Left Main: Normal              LAD:  40% stenosis in the mid segment              LCx: Normal              RCA: Minimal luminal irregularities involving the PLB                Supravalvular aortogram:  Normal caliber aorta, downward takeoff     Technical Factors  1. Complications: None  2. Estimated Blood Loss: <50 cc  3. Specimens: None  4. Contrast Volume: 70 ml  5. Medications: Radial cocktail (Verapamil 2.5 mg, Nitroglycerin 100 mcg) Heparin 5000 Units  6. Radiation (Air Kerma): 143 mGy       LABORATORY  Lab Results   Component Value Date    SODIUM 146 (H) 06/21/2024    POTASSIUM 3.2 (L) 06/21/2024    CHLORIDE 102 06/21/2024    CO2 32 06/21/2024    GLUCOSE 102 (H) 06/21/2024    BUN 23 (H) 06/21/2024    CREATININE 0.90 06/21/2024    CREATININE 0.8 06/02/2007        Lab Results   Component Value Date    WBC 10.2 06/18/2024    HEMOGLOBIN 14.4 " 06/18/2024    HEMATOCRIT 47.2 (H) 06/18/2024    PLATELETCT 365 06/18/2024        Total time of the discharge process 42 minutes.

## 2024-06-21 NOTE — DISCHARGE PLANNING
"TCN following. HTH/SCP chart reviewed. No new TCN needs identified. Please see prior TCN note from 6/19 for most recent discharge planning considerations if indicated. Anticipating dc to home with likely outpatient services and supplemental 02. Note that FTF/order for supplemental 02 is now in chart as well.  Note that 6 clicks mobility now at 20 and per review, pt ambulatory with no AD. Pt remains on 3L NC per review as well.     Completed:  PT recs no additional needs, today, on 6/21  Choice obtained: DEBORAH, MALACHI (PABLO Badillo)  SCP with [facility] P[facility]. Per prior TCN, \"discussed possible outpatient transitional PCP follow up if indicated and pt in agreement; sent information to assist in scheduling\"  "

## 2024-06-21 NOTE — PROGRESS NOTES
Discharge orders received.  Patient arrived to the discharge lounge.  PIV removed by bedside RN.  Dr. Hernandez reviewing medications and heart failure diagnosis with patient in the discharge lounge.  Instructions given, medications reviewed and general discharge education provided to patient. Heart failure booklet given to patient and reviewed. Follow up appointments discussed.  Patient verbalized understanding of dc instructions and prescriptions.  Patient signed discharge instructions.  Patient verbalized she had all belongings with her.  Patient going home on oxygen.  Has home oxygen concentrator.  Patient left via car to home with family.  Wished patient a speedy recovery.

## 2024-06-21 NOTE — CARE PLAN
The patient is Stable - Low risk of patient condition declining or worsening    Shift Goals  Clinical Goals: Monitor radial site, VSS  Patient Goals: rest, discharge tomorrow  Family Goals: LUCY    Progress made toward(s) clinical / shift goals:  Patient's radial site remained clean, dry and intact without symptoms of infection. Vital signs remained stable during shift.       Problem: Knowledge Deficit - Standard  Goal: Patient and family/care givers will demonstrate understanding of plan of care, disease process/condition, diagnostic tests and medications  Outcome: Progressing     Problem: Fall Risk  Goal: Patient will remain free from falls  Outcome: Progressing     Problem: Psychosocial  Goal: Patient's level of anxiety will decrease  Outcome: Progressing     Problem: Communication  Goal: The ability to communicate needs accurately and effectively will improve  Outcome: Progressing     Problem: Hemodynamics  Goal: Patient's hemodynamics, fluid balance and neurologic status will be stable or improve  Outcome: Progressing     Problem: Respiratory  Goal: Patient will achieve/maintain optimum respiratory ventilation and gas exchange  Outcome: Progressing     Problem: Fluid Volume  Goal: Fluid volume balance will be maintained  Outcome: Progressing

## 2024-06-22 LAB
KAPPA LC FREE SER-MCNC: 44.23 MG/L (ref 3.3–19.4)
KAPPA LC FREE/LAMBDA FREE SER NEPH: 1.47 {RATIO} (ref 0.26–1.65)
LAMBDA LC FREE SERPL-MCNC: 30.16 MG/L (ref 5.71–26.3)

## 2024-06-24 ENCOUNTER — PATIENT OUTREACH (OUTPATIENT)
Dept: MEDICAL GROUP | Facility: MEDICAL CENTER | Age: 69
End: 2024-06-24
Payer: MEDICARE

## 2024-06-24 LAB
ALBUMIN SERPL ELPH-MCNC: 3.39 G/DL (ref 3.75–5.01)
ALPHA1 GLOB SERPL ELPH-MCNC: 0.34 G/DL (ref 0.19–0.46)
ALPHA2 GLOB SERPL ELPH-MCNC: 0.84 G/DL (ref 0.48–1.05)
B-GLOBULIN SERPL ELPH-MCNC: 0.91 G/DL (ref 0.48–1.1)
GAMMA GLOB SERPL ELPH-MCNC: 1.12 G/DL (ref 0.62–1.51)
IGA SERPL-MCNC: 351 MG/DL (ref 68–408)
IGG SERPL-MCNC: 1212 MG/DL (ref 768–1632)
IGM SERPL-MCNC: 113 MG/DL (ref 35–263)
INTERPRETATION SERPL IFE-IMP: ABNORMAL
MONOCLON BAND OBS SERPL: ABNORMAL
MONOCLONAL PROTEIN NL11656: ABNORMAL G/DL
PATHOLOGY STUDY: ABNORMAL
PROT SERPL-MCNC: 6.6 G/DL (ref 6.3–8.2)

## 2024-06-24 NOTE — PROGRESS NOTES
Transitional Care Management  TCM Outreach Date and Time: Filed (6/24/2024 10:58 AM)    Discharge Questions  Actual Discharge Date: 06/21/24  Now that you are home, how are you feeling?: Good  Did you receive any new prescriptions?: Yes (Start: ASA, bisprolol, Farxiga, Lasix, Benicar, Spironolactone; CHANGE: levothyroxine, STOP: amlodipine, hydrochlorothiazide, lopressor)  Were you able to get them filled?: Yes ([facility])  Meds to Bed or Pharmacy filled?: Pharmacy  Do you have any questions about your current medications or new medications (Review Med Rec)?: No  Did you have any durable medical equipment ordered?: Yes (O2)  Did you receive it?: Yes  Do you have a follow up appointment scheduled with your PCP?: Yes  Appointment Date: 06/26/24  Appointment Time: 1500  Any issues or paperwork you wish to discuss with your PCP?: Yes (Please specify) (Wants to ask about symptoms had previous to a heart attack; bunions and ankles hurt really bad at night; O2 usage)  Are you (patient) able to get to the appointment?: Yes  If Home Health was ordered, have they contacted you (Patient): Not Applicable  Did you have enough support after your last discharge?: Yes (family)  Does this patient qualify for the CCM program?: Yes    Transitional Care  Number of attempts made to contact patient: 1  Current or previous attempts completed within two business days of discharge? : Yes  Provided education regarding treatment plan, medications, self-management, ADLs?: Yes (Pt takes off O2 and gets dizzy. Discussed with pt importance of wearing O2 all the time; discussed home medications)  Has patient completed an Advanced Directive?: No  Has the Care Manager's phone number provided?: Yes  Is there anything else I can help you with?: No    Discharge Summary  Chief Complaint: SOB; Pt reports shortness of breath at home that has increased over the last week. Pt also reports a cough with chest pressure x1 week.  Pt was 88% RA in  triage, placed on 2L NC.  Admitting Diagnosis: Acute systolic (congestive) heart failure  Discharge Diagnosis: Acute systolic (congestive) heart failure

## 2024-06-26 ENCOUNTER — OFFICE VISIT (OUTPATIENT)
Dept: MEDICAL GROUP | Facility: MEDICAL CENTER | Age: 69
End: 2024-06-26
Payer: MEDICARE

## 2024-06-26 VITALS
HEART RATE: 109 BPM | OXYGEN SATURATION: 92 % | TEMPERATURE: 98 F | RESPIRATION RATE: 16 BRPM | HEIGHT: 62 IN | BODY MASS INDEX: 39.93 KG/M2 | WEIGHT: 217 LBS | SYSTOLIC BLOOD PRESSURE: 132 MMHG | DIASTOLIC BLOOD PRESSURE: 80 MMHG

## 2024-06-26 DIAGNOSIS — R94.30 CARDIAC LV EJECTION FRACTION 21-30%: ICD-10-CM

## 2024-06-26 DIAGNOSIS — E03.4 HYPOTHYROIDISM DUE TO ACQUIRED ATROPHY OF THYROID: ICD-10-CM

## 2024-06-26 DIAGNOSIS — F32.1 CURRENT MODERATE EPISODE OF MAJOR DEPRESSIVE DISORDER WITHOUT PRIOR EPISODE (HCC): ICD-10-CM

## 2024-06-26 DIAGNOSIS — K74.69 OTHER CIRRHOSIS OF LIVER (HCC): ICD-10-CM

## 2024-06-26 DIAGNOSIS — E66.9 OBESITY, CLASS II, BMI 35-39.9: ICD-10-CM

## 2024-06-26 DIAGNOSIS — E87.6 HYPOKALEMIA: ICD-10-CM

## 2024-06-26 DIAGNOSIS — J96.01 ACUTE RESPIRATORY FAILURE WITH HYPOXIA (HCC): ICD-10-CM

## 2024-06-26 PROBLEM — E66.01 MORBID OBESITY WITH BMI OF 40.0-44.9, ADULT (HCC): Status: RESOLVED | Noted: 2021-12-02 | Resolved: 2024-06-26

## 2024-06-26 PROBLEM — I50.21 ACUTE SYSTOLIC (CONGESTIVE) HEART FAILURE (HCC): Status: RESOLVED | Noted: 2024-06-19 | Resolved: 2024-06-26

## 2024-06-26 PROCEDURE — 3075F SYST BP GE 130 - 139MM HG: CPT | Performed by: FAMILY MEDICINE

## 2024-06-26 PROCEDURE — 99214 OFFICE O/P EST MOD 30 MIN: CPT | Performed by: FAMILY MEDICINE

## 2024-06-26 PROCEDURE — 3079F DIAST BP 80-89 MM HG: CPT | Performed by: FAMILY MEDICINE

## 2024-06-26 RX ORDER — LEVOTHYROXINE SODIUM 88 UG/1
88 TABLET ORAL
Qty: 90 TABLET | Refills: 2 | Status: SHIPPED | OUTPATIENT
Start: 2024-06-26

## 2024-06-26 ASSESSMENT — FIBROSIS 4 INDEX: FIB4 SCORE: 0.93

## 2024-06-26 NOTE — PROGRESS NOTES
Chief Complaint   Patient presents with    Hospital Follow-up     Acute systolic (congestive) heart failure (HCC)    CHF (Acute)       Subjective:     HPI:   Deloris Brandt presents today with the following:  Hospital follow up    1. Cardiac LV ejection fraction 21-30%/Acute respiratory failure with hypoxia (HCC)  She was recently hospitalized for significant acute congestive heart failure with pulmonary edema and significant hypoxia.  She demonstrated pleural effusions, now on oxygen supplementation.  She has been placed on bisoprolol, spironolactone, furosemide and olmesartan.  She is tolerating the regimen well.  Echocardiogram unfortunately showed global hypokinesis and the reduced ejection fraction as discussed above.  She tells me that cardiology discussed possibly placing a pacemaker.  I believe an ICD is also being contemplated.  She has a follow-up with cardiology already scheduled.  She will continue on supplemental oxygen as her O2 sat today on 2 L is satisfactory at 92% but not ideal.    2. Current moderate episode of major depressive disorder without prior episode (HCC)  Patient is somewhat shaken and sad from her recent diagnoses.  She is on a very small dose of sertraline at 25 mg.  She is on duloxetine also so I cannot increase the sertraline by much but we can go up to 50 mg..  This changes made.  She will follow-up with her primary care in a little over 3 weeks.    3. Other cirrhosis of liver (HCC)  Scarring is seen.  No alcohol use history.  Reviewed old imaging which suggests fatty liver.  There was no ascites seen on recent imaging, cirrhosis is present but not decompensated.    5. Obesity, Class II, BMI 35-39.9  Patient's weight is back down to her baseline with significant water weight loss.  She will continue the current regimen.  She is quite limited in her ability to exert at this time due to her cardiac issues.    6. Hypothyroidism due to acquired atrophy of thyroid  Patient also needs a  renewal of her 88 mcg levothyroxine.  This is a dose adjustment as her TSH 6 days ago was mildly suppressed.    7. Hypokalemia  Patient did have mild hypokalemia in the hospital at 3.2.  She has since been gone on spironolactone along with her furosemide.  Follow-up lab order is discussed and placed.  She will complete this a few days before seeing cardiology and Dr. Paz again.        Patient Active Problem List    Diagnosis Date Noted    Cardiac LV ejection fraction 21-30% 06/26/2024    Obesity, Class II, BMI 35-39.9 06/26/2024    Cirrhosis of liver (HCC) 06/21/2024    Acute respiratory failure with hypoxia (HCC) 06/18/2024    Abnormal EKG 06/18/2024    Primary insomnia 03/18/2024    Arthritis 03/18/2024    Encounter for osteoporosis screening in asymptomatic postmenopausal patient 03/18/2024    Current moderate episode of major depressive disorder without prior episode (HCC) 10/31/2023    Leukocytosis 12/02/2021    Cataracts, bilateral 12/02/2021    Mixed hyperlipidemia 12/18/2018    Essential hypertension 12/18/2018    Osteopenia 12/18/2018    Hypothyroid 01/13/2018       Current medicines (including changes today)  Current Outpatient Medications   Medication Sig Dispense Refill    sertraline (ZOLOFT) 50 MG Tab Take 1 Tablet by mouth every day. 90 Tablet 2    levothyroxine (SYNTHROID) 88 MCG Tab Take 1 Tablet by mouth every morning on an empty stomach. 90 Tablet 2    aspirin 81 MG EC tablet Take 1 Tablet by mouth every day. 100 Tablet 1    bisoprolol (ZEBETA) 5 MG Tab Take 0.5 Tablets by mouth every day. 30 Tablet 1    dapagliflozin propanediol (FARXIGA) 10 MG Tab Take 1 Tablet by mouth every day. 30 Tablet 1    furosemide (LASIX) 40 MG Tab Take 1 Tablet by mouth 2 times a day. 60 Tablet 1    olmesartan (BENICAR) 40 MG Tab Take 1 Tablet by mouth every evening. 30 Tablet 1    spironolactone (ALDACTONE) 25 MG Tab Take 1 Tablet by mouth every day. 30 Tablet 1    fluticasone (FLONASE) 50 MCG/ACT nasal spray  "Administer 1 Spray into affected nostril(S) 1 time a day as needed.      Multiple Minerals-Vitamins (NUTRA-SUPPORT BONE PO) Take 1 Capsule by mouth every day.      DULoxetine (CYMBALTA) 30 MG Cap DR Particles TAKE 1 CAPSULE BY MOUTH EVERY DAY 90 Capsule 2    gabapentin (NEURONTIN) 100 MG Cap TAKE 1 CAPSULE BY MOUTH THREE TIMES DAILY (Patient taking differently: Take 100 mg by mouth 3 times a day. 2 cap po tid) 180 Capsule 2    lovastatin (MEVACOR) 20 MG Tab TAKE 1 TABLET BY MOUTH EVERY  Tablet 2    vitamin D (CHOLECALCIFEROL) 1000 Unit Tab Take 1,000 Units by mouth every day.       No current facility-administered medications for this visit.       No Known Allergies    ROS: As per HPI       Objective:     /80   Pulse (!) 109   Temp 36.7 °C (98 °F)   Resp 16   Ht 1.575 m (5' 2\")   Wt 98.4 kg (217 lb)   SpO2 92%  on 2 L O2 NP at rest Body mass index is 39.69 kg/m².    Physical Exam:  Constitutional: Well-developed and well-nourished. Not diaphoretic. No distress. Lucid and fluent.  Skin: Skin is warm and dry. No rash noted.  Head: Atraumatic without lesions.  Eyes: Conjunctivae and extraocular motions are normal. Pupils are equal, round, and reactive to light. No scleral icterus.   Ears:  External ears unremarkable.  Neck: Supple, trachea midline. No thyromegaly present. No cervical or supraclavicular lymphadenopathy. No JVD or carotid bruits appreciated  Cardiovascular: Regular rate and rhythm.  Normal S1, S2 without murmur appreciated.  Chest: Effort normal. still some fine rales, most notable right lateral base.  Abdomen: Soft, non tender, and without distention. Active bowel sounds in all four quadrants. No rebound, guarding, masses or hepatosplenomegaly.  Extremities: No cyanosis, clubbing, erythema, nor edema.   Neurological: Alert and oriented x 3.   Psychiatric:  Behavior, mood, and affect are appropriate.       Assessment and Plan:     68 y.o. female with the following issues:    1. Cardiac " LV ejection fraction 21-30%        2. Current moderate episode of major depressive disorder without prior episode (HCC)  sertraline (ZOLOFT) 50 MG Tab      3. Acute respiratory failure with hypoxia (HCC)        4. Other cirrhosis of liver (HCC)        5. Obesity, Class II, BMI 35-39.9        6. Hypothyroidism due to acquired atrophy of thyroid  levothyroxine (SYNTHROID) 88 MCG Tab      7. Hypokalemia  Basic Metabolic Panel            Followup: Return in about 27 days (around 7/23/2024), or if symptoms worsen or fail to improve, for with Dr Paz.

## 2024-06-28 ENCOUNTER — PATIENT OUTREACH (OUTPATIENT)
Dept: HEALTH INFORMATION MANAGEMENT | Facility: OTHER | Age: 69
End: 2024-06-28
Payer: MEDICARE

## 2024-06-28 DIAGNOSIS — E78.2 MIXED HYPERLIPIDEMIA: ICD-10-CM

## 2024-06-28 DIAGNOSIS — I10 ESSENTIAL HYPERTENSION: ICD-10-CM

## 2024-07-02 ENCOUNTER — OFFICE VISIT (OUTPATIENT)
Dept: CARDIOLOGY | Facility: MEDICAL CENTER | Age: 69
End: 2024-07-02
Attending: NURSE PRACTITIONER
Payer: MEDICARE

## 2024-07-02 VITALS
OXYGEN SATURATION: 91 % | RESPIRATION RATE: 16 BRPM | HEIGHT: 62 IN | HEART RATE: 93 BPM | SYSTOLIC BLOOD PRESSURE: 74 MMHG | DIASTOLIC BLOOD PRESSURE: 56 MMHG | BODY MASS INDEX: 39.56 KG/M2 | WEIGHT: 215 LBS

## 2024-07-02 DIAGNOSIS — I25.10 CORONARY ARTERY DISEASE INVOLVING NATIVE CORONARY ARTERY OF NATIVE HEART WITHOUT ANGINA PECTORIS: ICD-10-CM

## 2024-07-02 DIAGNOSIS — I42.8 NONISCHEMIC CARDIOMYOPATHY (HCC): ICD-10-CM

## 2024-07-02 DIAGNOSIS — E78.5 DYSLIPIDEMIA: ICD-10-CM

## 2024-07-02 DIAGNOSIS — E78.2 MIXED HYPERLIPIDEMIA: ICD-10-CM

## 2024-07-02 DIAGNOSIS — I50.21 ACUTE SYSTOLIC (CONGESTIVE) HEART FAILURE (HCC): ICD-10-CM

## 2024-07-02 DIAGNOSIS — I34.0 MODERATE MITRAL REGURGITATION: ICD-10-CM

## 2024-07-02 DIAGNOSIS — Z79.899 HIGH RISK MEDICATION USE: ICD-10-CM

## 2024-07-02 DIAGNOSIS — I10 ESSENTIAL HYPERTENSION, BENIGN: ICD-10-CM

## 2024-07-02 DIAGNOSIS — I50.20 ACC/AHA STAGE C SYSTOLIC HEART FAILURE (HCC): ICD-10-CM

## 2024-07-02 DIAGNOSIS — I44.7 LEFT BUNDLE BRANCH BLOCK: ICD-10-CM

## 2024-07-02 PROCEDURE — 3074F SYST BP LT 130 MM HG: CPT | Performed by: NURSE PRACTITIONER

## 2024-07-02 PROCEDURE — 3078F DIAST BP <80 MM HG: CPT | Performed by: NURSE PRACTITIONER

## 2024-07-02 PROCEDURE — 99213 OFFICE O/P EST LOW 20 MIN: CPT | Performed by: NURSE PRACTITIONER

## 2024-07-02 PROCEDURE — 99215 OFFICE O/P EST HI 40 MIN: CPT | Performed by: NURSE PRACTITIONER

## 2024-07-02 RX ORDER — FUROSEMIDE 40 MG/1
40 TABLET ORAL 2 TIMES DAILY
Qty: 60 TABLET | Refills: 1 | Status: SHIPPED | OUTPATIENT
Start: 2024-07-02 | End: 2024-07-23

## 2024-07-02 RX ORDER — LOVASTATIN 20 MG/1
20 TABLET ORAL DAILY
Qty: 100 TABLET | Refills: 2 | Status: SHIPPED | OUTPATIENT
Start: 2024-07-02

## 2024-07-02 RX ORDER — SPIRONOLACTONE 25 MG/1
25 TABLET ORAL DAILY
Qty: 90 TABLET | Refills: 3 | Status: SHIPPED | OUTPATIENT
Start: 2024-07-02 | End: 2024-07-16

## 2024-07-02 RX ORDER — LOSARTAN POTASSIUM 25 MG/1
25 TABLET ORAL EVERY EVENING
Qty: 90 TABLET | Refills: 3 | Status: SHIPPED | OUTPATIENT
Start: 2024-07-02

## 2024-07-02 RX ORDER — METOPROLOL SUCCINATE 25 MG/1
25 TABLET, EXTENDED RELEASE ORAL DAILY
Qty: 90 TABLET | Refills: 3 | Status: SHIPPED | OUTPATIENT
Start: 2024-07-02 | End: 2024-07-16 | Stop reason: SDUPTHER

## 2024-07-02 RX ORDER — DAPAGLIFLOZIN 10 MG/1
10 TABLET, FILM COATED ORAL DAILY
Qty: 90 TABLET | Refills: 3 | Status: SHIPPED | OUTPATIENT
Start: 2024-07-02

## 2024-07-02 ASSESSMENT — MINNESOTA LIVING WITH HEART FAILURE QUESTIONNAIRE (MLHF)
SWELLING IN ANKLES OR LEGS: 5
DIFFICULTY WITH RECREATIONAL PASTIMES, SPORTS, HOBBIES: 5
DIFFICULTY WITH SEXUAL ACTIVITIES: 0
MAKING YOU FEEL DEPRESSED: 4
DIFFICULTY SOCIALIZING WITH FAMILY OR FRIENDS: 5
WORKING AROUND THE HOUSE OR YARD DIFFICULT: 5
WALKING ABOUT OR CLIMBING STAIRS DIFFICULT: 5
MAKING YOU SHORT OF BREATH: 5
MAKING YOU STAY IN A HOSPITAL: 5
MAKING YOU WORRY: 4
DIFFICULTY WORKING TO EARN A LIVING: 0
TIRED, FATIGUED OR LOW ON ENERGY: 5
EATING LESS FOODS YOU LIKE: 4
TOTAL_SCORE: 71
DIFFICULTY SLEEPING WELL AT NIGHT: 5
HAVING TO SIT OR LIE DOWN DURING THE DAY: 1
LOSS OF SELF CONTROL IN YOUR LIFE: 1
DIFFICULTY TO CONCENTRATE OR REMEMBERING THINGS: 1
DIFFICULTY GOING AWAY FROM HOME: 5
FEELING LIKE A BURDEN TO FAMILY AND FRIENDS: 1
COSTING YOU MONEY FOR MEDICAL CARE: 5
GIVING YOU SIDE EFFECTS FROM TREATMENTS: 0

## 2024-07-02 ASSESSMENT — 6 MINUTE WALK TEST (6MWT): TOTAL DISTANCE WALKED (METERS): 0

## 2024-07-02 ASSESSMENT — FIBROSIS 4 INDEX: FIB4 SCORE: 0.93

## 2024-07-10 ENCOUNTER — HOSPITAL ENCOUNTER (OUTPATIENT)
Dept: LAB | Facility: MEDICAL CENTER | Age: 69
End: 2024-07-10
Attending: NURSE PRACTITIONER
Payer: MEDICARE

## 2024-07-10 DIAGNOSIS — I50.21 ACUTE SYSTOLIC (CONGESTIVE) HEART FAILURE (HCC): ICD-10-CM

## 2024-07-10 DIAGNOSIS — I44.7 LEFT BUNDLE BRANCH BLOCK: ICD-10-CM

## 2024-07-10 DIAGNOSIS — I50.20 ACC/AHA STAGE C SYSTOLIC HEART FAILURE (HCC): ICD-10-CM

## 2024-07-10 DIAGNOSIS — I10 ESSENTIAL HYPERTENSION, BENIGN: ICD-10-CM

## 2024-07-10 LAB
ANION GAP SERPL CALC-SCNC: 15 MMOL/L (ref 7–16)
BUN SERPL-MCNC: 69 MG/DL (ref 8–22)
CALCIUM SERPL-MCNC: 10 MG/DL (ref 8.5–10.5)
CHLORIDE SERPL-SCNC: 102 MMOL/L (ref 96–112)
CO2 SERPL-SCNC: 21 MMOL/L (ref 20–33)
CREAT SERPL-MCNC: 2.05 MG/DL (ref 0.5–1.4)
GFR SERPLBLD CREATININE-BSD FMLA CKD-EPI: 26 ML/MIN/1.73 M 2
GLUCOSE SERPL-MCNC: 101 MG/DL (ref 65–99)
POTASSIUM SERPL-SCNC: 4.8 MMOL/L (ref 3.6–5.5)
SODIUM SERPL-SCNC: 138 MMOL/L (ref 135–145)

## 2024-07-10 PROCEDURE — 36415 COLL VENOUS BLD VENIPUNCTURE: CPT

## 2024-07-10 PROCEDURE — 80048 BASIC METABOLIC PNL TOTAL CA: CPT

## 2024-07-11 ENCOUNTER — TELEPHONE (OUTPATIENT)
Dept: CARDIOLOGY | Facility: MEDICAL CENTER | Age: 69
End: 2024-07-11
Payer: MEDICARE

## 2024-07-11 DIAGNOSIS — I50.21 ACUTE SYSTOLIC (CONGESTIVE) HEART FAILURE (HCC): ICD-10-CM

## 2024-07-11 PROBLEM — I42.8 NONISCHEMIC CARDIOMYOPATHY (HCC): Status: ACTIVE | Noted: 2024-07-11

## 2024-07-11 PROBLEM — I50.20 ACC/AHA STAGE C SYSTOLIC HEART FAILURE (HCC): Status: ACTIVE | Noted: 2024-07-11

## 2024-07-16 ENCOUNTER — NON-PROVIDER VISIT (OUTPATIENT)
Dept: CARDIOLOGY | Facility: MEDICAL CENTER | Age: 69
End: 2024-07-16
Attending: NURSE PRACTITIONER
Payer: MEDICARE

## 2024-07-16 ENCOUNTER — TELEPHONE (OUTPATIENT)
Dept: VASCULAR LAB | Facility: MEDICAL CENTER | Age: 69
End: 2024-07-16

## 2024-07-16 ENCOUNTER — HOSPITAL ENCOUNTER (OUTPATIENT)
Dept: LAB | Facility: MEDICAL CENTER | Age: 69
End: 2024-07-16
Attending: STUDENT IN AN ORGANIZED HEALTH CARE EDUCATION/TRAINING PROGRAM
Payer: MEDICARE

## 2024-07-16 ENCOUNTER — HOSPITAL ENCOUNTER (OUTPATIENT)
Dept: LAB | Facility: MEDICAL CENTER | Age: 69
End: 2024-07-16
Attending: FAMILY MEDICINE
Payer: MEDICARE

## 2024-07-16 VITALS — OXYGEN SATURATION: 94 % | SYSTOLIC BLOOD PRESSURE: 98 MMHG | DIASTOLIC BLOOD PRESSURE: 64 MMHG | HEART RATE: 112 BPM

## 2024-07-16 VITALS
HEART RATE: 112 BPM | BODY MASS INDEX: 39.56 KG/M2 | WEIGHT: 215 LBS | HEIGHT: 62 IN | DIASTOLIC BLOOD PRESSURE: 64 MMHG | SYSTOLIC BLOOD PRESSURE: 98 MMHG

## 2024-07-16 DIAGNOSIS — I44.7 LEFT BUNDLE BRANCH BLOCK: ICD-10-CM

## 2024-07-16 DIAGNOSIS — I50.21 ACUTE SYSTOLIC (CONGESTIVE) HEART FAILURE (HCC): ICD-10-CM

## 2024-07-16 DIAGNOSIS — Z71.89 ENCOUNTER FOR EDUCATION ABOUT HEART FAILURE: ICD-10-CM

## 2024-07-16 DIAGNOSIS — I50.20 ACC/AHA STAGE C SYSTOLIC HEART FAILURE (HCC): ICD-10-CM

## 2024-07-16 DIAGNOSIS — I10 ESSENTIAL HYPERTENSION, BENIGN: ICD-10-CM

## 2024-07-16 DIAGNOSIS — J96.01 ACUTE RESPIRATORY FAILURE WITH HYPOXIA (HCC): ICD-10-CM

## 2024-07-16 DIAGNOSIS — I10 ESSENTIAL HYPERTENSION: ICD-10-CM

## 2024-07-16 DIAGNOSIS — E87.6 HYPOKALEMIA: ICD-10-CM

## 2024-07-16 LAB
ALBUMIN SERPL BCP-MCNC: 4 G/DL (ref 3.2–4.9)
ALBUMIN/GLOB SERPL: 1.1 G/DL
ALP SERPL-CCNC: 102 U/L (ref 30–99)
ALT SERPL-CCNC: 21 U/L (ref 2–50)
ANION GAP SERPL CALC-SCNC: 14 MMOL/L (ref 7–16)
ANION GAP SERPL CALC-SCNC: 14 MMOL/L (ref 7–16)
AST SERPL-CCNC: 16 U/L (ref 12–45)
BASOPHILS # BLD AUTO: 0.8 % (ref 0–1.8)
BASOPHILS # BLD: 0.14 K/UL (ref 0–0.12)
BILIRUB SERPL-MCNC: 0.3 MG/DL (ref 0.1–1.5)
BUN SERPL-MCNC: 52 MG/DL (ref 8–22)
BUN SERPL-MCNC: 58 MG/DL (ref 8–22)
CALCIUM ALBUM COR SERPL-MCNC: 9.6 MG/DL (ref 8.5–10.5)
CALCIUM SERPL-MCNC: 9.6 MG/DL (ref 8.5–10.5)
CALCIUM SERPL-MCNC: 9.6 MG/DL (ref 8.5–10.5)
CHLORIDE SERPL-SCNC: 104 MMOL/L (ref 96–112)
CHLORIDE SERPL-SCNC: 105 MMOL/L (ref 96–112)
CO2 SERPL-SCNC: 21 MMOL/L (ref 20–33)
CO2 SERPL-SCNC: 22 MMOL/L (ref 20–33)
CREAT SERPL-MCNC: 1.68 MG/DL (ref 0.5–1.4)
CREAT SERPL-MCNC: 1.89 MG/DL (ref 0.5–1.4)
EKG IMPRESSION: NORMAL
EOSINOPHIL # BLD AUTO: 1.14 K/UL (ref 0–0.51)
EOSINOPHIL NFR BLD: 6.4 % (ref 0–6.9)
ERYTHROCYTE [DISTWIDTH] IN BLOOD BY AUTOMATED COUNT: 42.2 FL (ref 35.9–50)
GFR SERPLBLD CREATININE-BSD FMLA CKD-EPI: 28 ML/MIN/1.73 M 2
GFR SERPLBLD CREATININE-BSD FMLA CKD-EPI: 32 ML/MIN/1.73 M 2
GLOBULIN SER CALC-MCNC: 3.8 G/DL (ref 1.9–3.5)
GLUCOSE SERPL-MCNC: 104 MG/DL (ref 65–99)
GLUCOSE SERPL-MCNC: 99 MG/DL (ref 65–99)
HCT VFR BLD AUTO: 45.6 % (ref 37–47)
HGB BLD-MCNC: 14.7 G/DL (ref 12–16)
IMM GRANULOCYTES # BLD AUTO: 0.05 K/UL (ref 0–0.11)
IMM GRANULOCYTES NFR BLD AUTO: 0.3 % (ref 0–0.9)
LYMPHOCYTES # BLD AUTO: 2.29 K/UL (ref 1–4.8)
LYMPHOCYTES NFR BLD: 12.8 % (ref 22–41)
MAGNESIUM SERPL-MCNC: 2.6 MG/DL (ref 1.5–2.5)
MCH RBC QN AUTO: 28.5 PG (ref 27–33)
MCHC RBC AUTO-ENTMCNC: 32.2 G/DL (ref 32.2–35.5)
MCV RBC AUTO: 88.4 FL (ref 81.4–97.8)
MONOCYTES # BLD AUTO: 1.26 K/UL (ref 0–0.85)
MONOCYTES NFR BLD AUTO: 7.1 % (ref 0–13.4)
NEUTROPHILS # BLD AUTO: 12.95 K/UL (ref 1.82–7.42)
NEUTROPHILS NFR BLD: 72.6 % (ref 44–72)
NRBC # BLD AUTO: 0 K/UL
NRBC BLD-RTO: 0 /100 WBC (ref 0–0.2)
PLATELET # BLD AUTO: 292 K/UL (ref 164–446)
PMV BLD AUTO: 11.9 FL (ref 9–12.9)
POTASSIUM SERPL-SCNC: 4.6 MMOL/L (ref 3.6–5.5)
POTASSIUM SERPL-SCNC: 4.7 MMOL/L (ref 3.6–5.5)
PROT SERPL-MCNC: 7.8 G/DL (ref 6–8.2)
RBC # BLD AUTO: 5.16 M/UL (ref 4.2–5.4)
SODIUM SERPL-SCNC: 139 MMOL/L (ref 135–145)
SODIUM SERPL-SCNC: 141 MMOL/L (ref 135–145)
WBC # BLD AUTO: 17.8 K/UL (ref 4.8–10.8)

## 2024-07-16 PROCEDURE — 3078F DIAST BP <80 MM HG: CPT | Performed by: NURSE PRACTITIONER

## 2024-07-16 PROCEDURE — 83735 ASSAY OF MAGNESIUM: CPT

## 2024-07-16 PROCEDURE — 99024 POSTOP FOLLOW-UP VISIT: CPT | Performed by: NURSE PRACTITIONER

## 2024-07-16 PROCEDURE — 93010 ELECTROCARDIOGRAM REPORT: CPT | Performed by: STUDENT IN AN ORGANIZED HEALTH CARE EDUCATION/TRAINING PROGRAM

## 2024-07-16 PROCEDURE — 80053 COMPREHEN METABOLIC PANEL: CPT

## 2024-07-16 PROCEDURE — 80048 BASIC METABOLIC PNL TOTAL CA: CPT

## 2024-07-16 PROCEDURE — 93005 ELECTROCARDIOGRAM TRACING: CPT

## 2024-07-16 PROCEDURE — 36415 COLL VENOUS BLD VENIPUNCTURE: CPT

## 2024-07-16 PROCEDURE — 3074F SYST BP LT 130 MM HG: CPT | Performed by: NURSE PRACTITIONER

## 2024-07-16 PROCEDURE — 99213 OFFICE O/P EST LOW 20 MIN: CPT

## 2024-07-16 PROCEDURE — 85025 COMPLETE CBC W/AUTO DIFF WBC: CPT

## 2024-07-16 RX ORDER — METOPROLOL SUCCINATE 25 MG/1
37.5 TABLET, EXTENDED RELEASE ORAL DAILY
Qty: 135 TABLET | Refills: 1 | Status: SHIPPED | OUTPATIENT
Start: 2024-07-16 | End: 2024-07-23

## 2024-07-16 ASSESSMENT — FIBROSIS 4 INDEX: FIB4 SCORE: 0.93

## 2024-07-22 PROBLEM — I11.0 HYPERTENSIVE HEART DISEASE WITH CHRONIC SYSTOLIC CONGESTIVE HEART FAILURE (HCC): Status: ACTIVE | Noted: 2024-07-22

## 2024-07-22 PROBLEM — J96.11 CHRONIC HYPOXEMIC RESPIRATORY FAILURE (HCC): Status: ACTIVE | Noted: 2024-07-22

## 2024-07-22 PROBLEM — J96.01 ACUTE RESPIRATORY FAILURE WITH HYPOXIA (HCC): Status: RESOLVED | Noted: 2024-06-18 | Resolved: 2024-07-22

## 2024-07-22 PROBLEM — I50.22 HYPERTENSIVE HEART DISEASE WITH CHRONIC SYSTOLIC CONGESTIVE HEART FAILURE (HCC): Status: ACTIVE | Noted: 2024-07-22

## 2024-07-23 ENCOUNTER — APPOINTMENT (OUTPATIENT)
Dept: MEDICAL GROUP | Facility: MEDICAL CENTER | Age: 69
End: 2024-07-23
Payer: MEDICARE

## 2024-07-23 ENCOUNTER — NON-PROVIDER VISIT (OUTPATIENT)
Dept: CARDIOLOGY | Facility: MEDICAL CENTER | Age: 69
End: 2024-07-23
Attending: NURSE PRACTITIONER
Payer: MEDICARE

## 2024-07-23 VITALS
WEIGHT: 210 LBS | HEART RATE: 103 BPM | HEIGHT: 62 IN | BODY MASS INDEX: 38.64 KG/M2 | DIASTOLIC BLOOD PRESSURE: 66 MMHG | SYSTOLIC BLOOD PRESSURE: 94 MMHG

## 2024-07-23 VITALS
OXYGEN SATURATION: 92 % | DIASTOLIC BLOOD PRESSURE: 68 MMHG | HEART RATE: 102 BPM | HEIGHT: 62 IN | BODY MASS INDEX: 38.64 KG/M2 | RESPIRATION RATE: 16 BRPM | SYSTOLIC BLOOD PRESSURE: 116 MMHG | WEIGHT: 210 LBS

## 2024-07-23 DIAGNOSIS — I50.20 ACC/AHA STAGE C SYSTOLIC HEART FAILURE (HCC): ICD-10-CM

## 2024-07-23 DIAGNOSIS — Z09 HOSPITAL DISCHARGE FOLLOW-UP: ICD-10-CM

## 2024-07-23 DIAGNOSIS — J96.01 ACUTE RESPIRATORY FAILURE WITH HYPOXIA (HCC): ICD-10-CM

## 2024-07-23 DIAGNOSIS — I42.8 NONISCHEMIC CARDIOMYOPATHY (HCC): ICD-10-CM

## 2024-07-23 DIAGNOSIS — N18.32 STAGE 3B CHRONIC KIDNEY DISEASE: ICD-10-CM

## 2024-07-23 PROCEDURE — 3074F SYST BP LT 130 MM HG: CPT | Performed by: FAMILY MEDICINE

## 2024-07-23 PROCEDURE — 99212 OFFICE O/P EST SF 10 MIN: CPT

## 2024-07-23 PROCEDURE — 99214 OFFICE O/P EST MOD 30 MIN: CPT | Performed by: FAMILY MEDICINE

## 2024-07-23 PROCEDURE — 3078F DIAST BP <80 MM HG: CPT | Performed by: FAMILY MEDICINE

## 2024-07-23 RX ORDER — METOPROLOL SUCCINATE 25 MG/1
50 TABLET, EXTENDED RELEASE ORAL DAILY
Qty: 180 TABLET | Refills: 1 | Status: SHIPPED | OUTPATIENT
Start: 2024-07-23

## 2024-07-23 RX ORDER — FUROSEMIDE 40 MG/1
20 TABLET ORAL DAILY
Qty: 180 TABLET | Refills: 1 | Status: SHIPPED | OUTPATIENT
Start: 2024-07-23

## 2024-07-23 ASSESSMENT — FIBROSIS 4 INDEX
FIB4 SCORE: 0.84
FIB4 SCORE: 0.84

## 2024-07-30 ENCOUNTER — HOSPITAL ENCOUNTER (OUTPATIENT)
Dept: LAB | Facility: MEDICAL CENTER | Age: 69
End: 2024-07-30
Attending: FAMILY MEDICINE
Payer: MEDICARE

## 2024-07-30 DIAGNOSIS — N18.32 STAGE 3B CHRONIC KIDNEY DISEASE: ICD-10-CM

## 2024-07-30 LAB
ANION GAP SERPL CALC-SCNC: 16 MMOL/L (ref 7–16)
BUN SERPL-MCNC: 22 MG/DL (ref 8–22)
CALCIUM SERPL-MCNC: 9.5 MG/DL (ref 8.5–10.5)
CHLORIDE SERPL-SCNC: 103 MMOL/L (ref 96–112)
CO2 SERPL-SCNC: 24 MMOL/L (ref 20–33)
CREAT SERPL-MCNC: 0.92 MG/DL (ref 0.5–1.4)
GFR SERPLBLD CREATININE-BSD FMLA CKD-EPI: 67 ML/MIN/1.73 M 2
GLUCOSE SERPL-MCNC: 108 MG/DL (ref 65–99)
POTASSIUM SERPL-SCNC: 3.7 MMOL/L (ref 3.6–5.5)
SODIUM SERPL-SCNC: 143 MMOL/L (ref 135–145)

## 2024-07-30 PROCEDURE — 80048 BASIC METABOLIC PNL TOTAL CA: CPT

## 2024-07-30 PROCEDURE — 36415 COLL VENOUS BLD VENIPUNCTURE: CPT

## 2024-07-30 NOTE — PROGRESS NOTES
No chief complaint on file.      Subjective     Deloris Brandt is a 68 y.o. female who presents today as heart failure new after [facility] hospitalization on 6/18/2024.  Patient has additional medical problems of obesity, hypertension, hyperlipidemia, hypothyroid.     Patient last seen by myself on 7/2/2024.  Since patient was last seen established with heart failure education and pharmacotherapy clinic    Today, ***  ***Today, patient reports she has been on supplemental oxygen since discharge from the hospital.  Occasional lightheadedness which is associated with standing.  Prior to hospitalization.  Patient was not on supplemental oxygen.  Otherwise, denies chest pain, palpitations, orthopnea, PND or Edema.     Based on physical examination findings, patient is remains fluid overloaded. No JVD, lungs are diminished auscultation, no pitting edema in bilateral lower extremities, no ascites. Dry weight is TBD, weight in office today is 215 lbs.    Blood pressure is overcontrolled in office today.  GDMT will be optimized per below.  We will reevaluate renal electrolyte function in 1 week for high risk medication use.  Will refer patient to heart failure education as well as pharmacotherapy clinic.  Will also have patient obtain follow-up echocardiogram in 3 months.  Patient to follow-up with heart failure provider in 4 weeks, sooner if needed.    Past Medical History:   Diagnosis Date    Cardiac LV ejection fraction 21-30% 06/26/2024    Hyperglycemia     Hyperlipidemia     Hypertension     Hypokalemia     Hypothyroid     Obesity, Class II, BMI 35-39.9 06/26/2024     Past Surgical History:   Procedure Laterality Date    OTHER ABDOMINAL SURGERY  01/01/2001    Gastric bypass    APPENDECTOMY  48 years ago    OTHER      tonsilectomy     Family History   Problem Relation Age of Onset    Cancer Mother     Diabetes Father      Social History     Socioeconomic History    Marital status: Single     Spouse name: Not on file     Number of children: Not on file    Years of education: Not on file    Highest education level: Not on file   Occupational History    Not on file   Tobacco Use    Smoking status: Never    Smokeless tobacco: Never   Vaping Use    Vaping status: Never Used   Substance and Sexual Activity    Alcohol use: No    Drug use: No    Sexual activity: Not Currently   Other Topics Concern     Service Not Asked    Blood Transfusions Not Asked    Caffeine Concern Not Asked    Occupational Exposure Not Asked    Hobby Hazards Not Asked    Sleep Concern Not Asked    Stress Concern Not Asked    Weight Concern Not Asked    Special Diet Not Asked    Back Care Not Asked    Exercise Not Asked    Bike Helmet Not Asked    Seat Belt Not Asked    Self-Exams Not Asked   Social History Narrative    Not on file     Social Determinants of Health     Financial Resource Strain: Low Risk  (3/18/2024)    Overall Financial Resource Strain (CARDIA)     Difficulty of Paying Living Expenses: Not very hard   Food Insecurity: No Food Insecurity (6/18/2024)    Hunger Vital Sign     Worried About Running Out of Food in the Last Year: Never true     Ran Out of Food in the Last Year: Never true   Transportation Needs: No Transportation Needs (6/18/2024)    PRAPARE - Transportation     Lack of Transportation (Medical): No     Lack of Transportation (Non-Medical): No   Physical Activity: Not on file   Stress: Not on file   Social Connections: Not on file   Intimate Partner Violence: Not At Risk (6/18/2024)    Humiliation, Afraid, Rape, and Kick questionnaire     Fear of Current or Ex-Partner: No     Emotionally Abused: No     Physically Abused: No     Sexually Abused: No   Housing Stability: Low Risk  (6/18/2024)    Housing Stability Vital Sign     Unable to Pay for Housing in the Last Year: No     Number of Places Lived in the Last Year: 1     Unstable Housing in the Last Year: No     No Known Allergies  Outpatient Encounter Medications as of 8/1/2024    Medication Sig Dispense Refill    metoprolol SR (TOPROL XL) 25 MG TABLET SR 24 HR Take 2 Tablets by mouth every day. 180 Tablet 1    furosemide (LASIX) 40 MG Tab Take 0.5 Tablets by mouth every day. Take daily or twice daily as needed. 180 Tablet 1    lovastatin (MEVACOR) 20 MG Tab TAKE 1 TABLET BY MOUTH EVERY  Tablet 2    dapagliflozin propanediol (FARXIGA) 10 MG Tab Take 1 Tablet by mouth every day. 90 Tablet 3    losartan (COZAAR) 25 MG Tab Take 1 Tablet by mouth every evening. 90 Tablet 3    sertraline (ZOLOFT) 50 MG Tab Take 1 Tablet by mouth every day. (Patient not taking: Reported on 7/23/2024) 90 Tablet 2    levothyroxine (SYNTHROID) 88 MCG Tab Take 1 Tablet by mouth every morning on an empty stomach. 90 Tablet 2    aspirin 81 MG EC tablet Take 1 Tablet by mouth every day. 100 Tablet 1    fluticasone (FLONASE) 50 MCG/ACT nasal spray Administer 1 Spray into affected nostril(S) 1 time a day as needed.      Multiple Minerals-Vitamins (NUTRA-SUPPORT BONE PO) Take 1 Capsule by mouth every day.      DULoxetine (CYMBALTA) 30 MG Cap DR Particles TAKE 1 CAPSULE BY MOUTH EVERY DAY 90 Capsule 2    gabapentin (NEURONTIN) 100 MG Cap TAKE 1 CAPSULE BY MOUTH THREE TIMES DAILY (Patient taking differently: Take 100 mg by mouth 3 times a day. 2 cap po tid) 180 Capsule 2    vitamin D (CHOLECALCIFEROL) 1000 Unit Tab Take 1,000 Units by mouth every day.       No facility-administered encounter medications on file as of 8/1/2024.     ROS Complete review of systems negative except as noted in HPI/subjective           Objective     There were no vitals taken for this visit.    Physical Exam  Vitals reviewed.   Constitutional:       Appearance: She is well-developed.   HENT:      Head: Normocephalic and atraumatic.   Eyes:      Pupils: Pupils are equal, round, and reactive to light.   Neck:      Vascular: No JVD.   Cardiovascular:      Rate and Rhythm: Normal rate and regular rhythm.      Pulses: Normal pulses.      Heart  sounds: Normal heart sounds. No murmur heard.     No friction rub. No gallop.   Pulmonary:      Effort: Pulmonary effort is normal. No respiratory distress.      Breath sounds: Normal breath sounds.   Abdominal:      General: Bowel sounds are normal. There is no distension.      Palpations: Abdomen is soft.   Musculoskeletal:      Right lower leg: No edema.      Left lower leg: No edema.   Skin:     General: Skin is warm and dry.      Findings: No erythema.   Neurological:      General: No focal deficit present.      Mental Status: She is alert and oriented to person, place, and time.   Psychiatric:         Behavior: Behavior normal.       Lab Results   Component Value Date/Time    CHOLSTRLTOT 142 06/21/2024 01:59 AM    LDL 85 06/21/2024 01:59 AM    HDL 29 (A) 06/21/2024 01:59 AM    TRIGLYCERIDE 142 06/21/2024 01:59 AM       Lab Results   Component Value Date/Time    SODIUM 139 07/16/2024 11:26 AM    SODIUM 141 07/16/2024 11:26 AM    POTASSIUM 4.7 07/16/2024 11:26 AM    POTASSIUM 4.6 07/16/2024 11:26 AM    CHLORIDE 104 07/16/2024 11:26 AM    CHLORIDE 105 07/16/2024 11:26 AM    CO2 21 07/16/2024 11:26 AM    CO2 22 07/16/2024 11:26 AM    GLUCOSE 104 (H) 07/16/2024 11:26 AM    GLUCOSE 99 07/16/2024 11:26 AM    BUN 52 (H) 07/16/2024 11:26 AM    BUN 58 (H) 07/16/2024 11:26 AM    CREATININE 1.89 (H) 07/16/2024 11:26 AM    CREATININE 1.68 (H) 07/16/2024 11:26 AM    CREATININE 0.8 06/02/2007 09:35 AM    BUNCREATRAT 31 (H) 02/11/2022 08:48 AM     Lab Results   Component Value Date/Time    ALKPHOSPHAT 102 (H) 07/16/2024 11:26 AM    ASTSGOT 16 07/16/2024 11:26 AM    ALTSGPT 21 07/16/2024 11:26 AM    TBILIRUBIN 0.3 07/16/2024 11:26 AM     TTE (6/18/2024):  Severely reduced left ventricular systolic function.  Akinsis and thinning of the anterior, anteroseptal, inferoseptal and   apical wall segments compatible with prior LAD territory infarct.  Moderate eccentric mitral regurgitation - consider further assessement   if there is  clinical concern for more significant mitral regurgitation.      [facility] (6/20/2024):  IMPRESSIONS:  1.  Nonischemic cardiomyopathy  2.  Mild, nonobstructive two-vessel coronary disease  3.  Mild elevation LVEDP 26 mmHg  Hemodynamics:   Aorta: 111/65 mmHg  LVEDP: 26 mmHg  No significant pullback gradient across the aortic valve     Coronary Anatomy              Left Main: Normal              LAD:  40% stenosis in the mid segment              LCx: Normal              RCA: Minimal luminal irregularities involving the PLB                Supravalvular aortogram:  Normal caliber aorta, downward takeoff         Assessment & Plan     1. ACC/AHA stage C systolic heart failure (HCC)        2. Coronary artery disease involving native coronary artery of native heart without angina pectoris            Medical Decision Making: Today's Assessment/Status/Plan:        HFrEF, Stage C, Class III, LVEF 35%:  -Heart failure due to Nonischemic cardiomyopathy with LVEF of 25%, left bundle branch block.  Light chain negative, will discuss PYP in FU  -Discussed Heart failure trajectory and prognosis with patient. Will continue to optimize medical therapy as tolerated. Advanced HF treatment, need for remote monitoring consideration at every visit.  -ACE-I/ARB/ARNI: Transition benicar to losartan d/t overcontrolled BP  -Evidence Based Beta-blocker: Hold bisoprolol due to symptomatic hypotension.  -Aldosterone Antagonist: Continue spironolactone 25 mg daily  -SGLT2-I: Continue farxiga 10 mg daily  -Diuretic: Continue furosemide 40 mg twice daily  -Labs: BMP in 1 week. Will continue to closely monitor for side effects of patient's high risk medication(s) including renal function, NTproBNP, and electrolytes as needed  -Repeat Echo in 3 months, if LVEF not >35%, then will discuss/consider ICD for primary Prevention. LBBB (), refer to EP for CRT consideration after FU echo  -moderate MR: Reevaluate on follow-up echo.  Will refer to structural  heart clinic for Mitraclip evaluation if persistent.  -Reinforced s/sx of worsening heart failure with patient and weight monitoring. Pt verbalizes understanding. Pt to call office if present.  -Heart Failure Education: pt to be contacted by HF nurse for further education.  In the meantime, during visit today we discussed indications and use for each medication, importance of treatment adherence, definition of heart failure and potential etiologies for current diagnosis.  -Pharmacotherapy referral placed  -Advanced care planning: defer until FU echo     Mild nonobstructive coronary artery disease; HLD  -Continue aspirin 81 mg daily  -LDL at goal.  Continue lovastatin 20 mg daily    FU in clinic in 4 weeks with HF clinic. Sooner if needed.    Patient verbalizes understanding and agrees with the plan of care.       MERI Hill.PAH, HF-Cert   [facility]  [phone]    PLEASE NOTE: This Note was created using voice recognition Software. I have made every reasonable attempt to correct obvious errors, but I expect that there are errors of grammar and possibly content that I did not discover before finalizing the note

## 2024-08-01 ENCOUNTER — APPOINTMENT (OUTPATIENT)
Dept: CARDIOLOGY | Facility: MEDICAL CENTER | Age: 69
End: 2024-08-01
Attending: NURSE PRACTITIONER
Payer: MEDICARE

## 2024-08-01 VITALS
HEART RATE: 83 BPM | WEIGHT: 212.2 LBS | BODY MASS INDEX: 39.05 KG/M2 | RESPIRATION RATE: 16 BRPM | SYSTOLIC BLOOD PRESSURE: 108 MMHG | HEIGHT: 62 IN | OXYGEN SATURATION: 92 % | DIASTOLIC BLOOD PRESSURE: 68 MMHG

## 2024-08-01 DIAGNOSIS — E78.5 DYSLIPIDEMIA: ICD-10-CM

## 2024-08-01 DIAGNOSIS — I10 ESSENTIAL HYPERTENSION, BENIGN: ICD-10-CM

## 2024-08-01 DIAGNOSIS — Z79.899 HIGH RISK MEDICATION USE: ICD-10-CM

## 2024-08-01 DIAGNOSIS — I25.10 CORONARY ARTERY DISEASE INVOLVING NATIVE CORONARY ARTERY OF NATIVE HEART WITHOUT ANGINA PECTORIS: ICD-10-CM

## 2024-08-01 DIAGNOSIS — I50.20 ACC/AHA STAGE C SYSTOLIC HEART FAILURE (HCC): ICD-10-CM

## 2024-08-01 DIAGNOSIS — I44.7 LEFT BUNDLE BRANCH BLOCK: ICD-10-CM

## 2024-08-01 DIAGNOSIS — I34.0 MODERATE MITRAL REGURGITATION: ICD-10-CM

## 2024-08-01 DIAGNOSIS — I42.8 NONISCHEMIC CARDIOMYOPATHY (HCC): ICD-10-CM

## 2024-08-01 PROCEDURE — 99213 OFFICE O/P EST LOW 20 MIN: CPT | Performed by: NURSE PRACTITIONER

## 2024-08-01 PROCEDURE — 3078F DIAST BP <80 MM HG: CPT | Performed by: NURSE PRACTITIONER

## 2024-08-01 PROCEDURE — 99214 OFFICE O/P EST MOD 30 MIN: CPT | Performed by: NURSE PRACTITIONER

## 2024-08-01 PROCEDURE — 3074F SYST BP LT 130 MM HG: CPT | Performed by: NURSE PRACTITIONER

## 2024-08-01 RX ORDER — POTASSIUM CHLORIDE 1500 MG/1
20 TABLET, EXTENDED RELEASE ORAL DAILY
Qty: 90 TABLET | Refills: 1 | Status: SHIPPED | OUTPATIENT
Start: 2024-08-01 | End: 2024-08-30

## 2024-08-01 RX ORDER — FUROSEMIDE 40 MG
60 TABLET ORAL DAILY
Qty: 180 TABLET | Refills: 1 | Status: SHIPPED | OUTPATIENT
Start: 2024-08-01 | End: 2024-08-30

## 2024-08-01 ASSESSMENT — FIBROSIS 4 INDEX: FIB4 SCORE: 0.84

## 2024-08-05 ENCOUNTER — PATIENT OUTREACH (OUTPATIENT)
Dept: HEALTH INFORMATION MANAGEMENT | Facility: OTHER | Age: 69
End: 2024-08-05
Payer: MEDICARE

## 2024-08-05 NOTE — PROGRESS NOTES
CHW treid calling the pt to introduce the PCM program. Pt did not answer and this CHW left a  requesting a return call. 8/5  .Community Health Worker Follow-Up    Reason for outreach: CHW tried calling the pt to introduce the PCM program.    CHW Interventions: CHW made several attempts to reach the pt without success. This CHW will send a [employer] message with program details.    Specific Resources Provided:  Housing/Shelter: n/a  Transportation: n/a  Food: n/a  Financial: n/a  Social Supports: n/a  Other: MyChart message    Plan: CHW sent the pt a [employer] message with all program detail. CHW will not follow at this time.

## 2024-08-06 ENCOUNTER — PATIENT MESSAGE (OUTPATIENT)
Dept: HEALTH INFORMATION MANAGEMENT | Facility: OTHER | Age: 69
End: 2024-08-06

## 2024-08-09 ENCOUNTER — TELEPHONE (OUTPATIENT)
Dept: MEDICAL GROUP | Facility: MEDICAL CENTER | Age: 69
End: 2024-08-09
Payer: MEDICARE

## 2024-08-09 NOTE — TELEPHONE ENCOUNTER
Caller Name: Deloris Brandt    Call Back Number: [phone] (home)       How would the patient prefer to be contacted with a response: Phone call OK to leave a detailed message    2. What are the patient's symptoms (location & severity)? Vaginal bleeding     3. Is this a new symptom Yes    4. When did it start? Unknown     5. Action taken per Active Symptom Guide: Emergency Department recommended    6. Patient agrees to recommended action per Active Symptom Escalation Protocol.

## 2024-08-13 ENCOUNTER — NON-PROVIDER VISIT (OUTPATIENT)
Dept: CARDIOLOGY | Facility: MEDICAL CENTER | Age: 69
End: 2024-08-13
Attending: NURSE PRACTITIONER
Payer: MEDICARE

## 2024-08-13 VITALS
BODY MASS INDEX: 39.01 KG/M2 | WEIGHT: 212 LBS | HEART RATE: 80 BPM | DIASTOLIC BLOOD PRESSURE: 70 MMHG | HEIGHT: 62 IN | SYSTOLIC BLOOD PRESSURE: 115 MMHG

## 2024-08-13 DIAGNOSIS — I50.20 ACC/AHA STAGE C SYSTOLIC HEART FAILURE (HCC): ICD-10-CM

## 2024-08-13 PROCEDURE — 99212 OFFICE O/P EST SF 10 MIN: CPT

## 2024-08-13 ASSESSMENT — FIBROSIS 4 INDEX: FIB4 SCORE: 0.84

## 2024-08-13 NOTE — PROGRESS NOTES
"CHF Pharmacotherapy visit:    Deloris Brandt  1955    Informed written consent was given on: 07/16/24    HPI  Deloris Brandt is here for:  I50.21 (ICD-10-CM) - Acute systolic (congestive) heart failure (HCC)   I50.20 (ICD-10-CM) - ACC/AHA stage C systolic heart failure (HCC)   I10 (ICD-10-CM) - Essential hypertension, benign   I44.7 (ICD-10-CM) - Left bundle branch block     Pertinent Interval History since last visit:   F/u pt appt   Most recent EF:   06/18/24 16:50   Left Ventrical Ejection Fraction 25       SOCIAL HISTORY  Social History     Tobacco Use   Smoking Status Never   Smokeless Tobacco Never        Recent Imaging Studies:    None since last visit    DATA REVIEW  INR   Date Value Ref Range Status   01/13/2018 1.09 0.87 - 1.13 Final     Comment:     INR - Non-therapeutic Reference Range: 0.87-1.13  INR - Therapeutic Reference Range: 2.0-4.0       No results found for: \"POCINR\"   No results found for: \"HBA1C\"       Lab Results   Component Value Date/Time    CHOLSTRLTOT 142 06/21/2024 01:59 AM    LDL 85 06/21/2024 01:59 AM    HDL 29 (A) 06/21/2024 01:59 AM    TRIGLYCERIDE 142 06/21/2024 01:59 AM         Lab Results   Component Value Date/Time    SODIUM 143 07/30/2024 07:09 AM    POTASSIUM 3.7 07/30/2024 07:09 AM    CHLORIDE 103 07/30/2024 07:09 AM    CO2 24 07/30/2024 07:09 AM    GLUCOSE 108 (H) 07/30/2024 07:09 AM    BUN 22 07/30/2024 07:09 AM    CREATININE 0.92 07/30/2024 07:09 AM    CREATININE 0.8 06/02/2007 09:35 AM    BUNCREATRAT 31 (H) 02/11/2022 08:48 AM     Lab Results   Component Value Date/Time    ALKPHOSPHAT 102 (H) 07/16/2024 11:26 AM    ASTSGOT 16 07/16/2024 11:26 AM    ALTSGPT 21 07/16/2024 11:26 AM    TBILIRUBIN 0.3 07/16/2024 11:26 AM    INR 1.09 01/13/2018 02:27 PM    ALBUMIN 4.0 07/16/2024 11:26 AM    ALBUMIN 3.39 (L) 06/20/2024 03:51 PM      Lab Results   Component Value Date/Time    RBC 5.16 07/16/2024 11:26 AM    HEMOGLOBIN 14.7 07/16/2024 11:26 AM    HEMATOCRIT 45.6 07/16/2024 " "11:26 AM    MCV 88.4 07/16/2024 11:26 AM    MCH 28.5 07/16/2024 11:26 AM    MCHC 32.2 07/16/2024 11:26 AM    MPV 11.9 07/16/2024 11:26 AM      !: Data is abnormal    No results found for: \"MALBCRT\", \"MICROALBUR\"  Lab Results   Component Value Date/Time    TSHULTRASEN 0.113 (L) 06/21/2024 0159     Lab Results   Component Value Date/Time    FREET4 1.53 06/21/2024 0159     No results found for: \"FREET3\"  No results found for: \"THYSTIMIG\"          Significant changes to laboratory values since last visit that require repeat labs:  -  Other Pertinent Blood Work:   -      Current Outpatient Medications:     potassium chloride SA, 20 mEq, Oral, DAILY    furosemide, 60 mg, Oral, DAILY    metoprolol SR, 50 mg, Oral, DAILY    lovastatin, 20 mg, Oral, DAILY    dapagliflozin propanediol, 10 mg, Oral, DAILY    losartan, 25 mg, Oral, Q EVENING    sertraline, 50 mg, Oral, DAILY    levothyroxine, 88 mcg, Oral, AM ES    aspirin, 81 mg, Oral, DAILY    fluticasone, 1 Spray, Nasal, QDAY PRN    Multiple Minerals-Vitamins (NUTRA-SUPPORT BONE PO), 1 Capsule, Oral, DAILY    DULoxetine, 30 mg, Oral, DAILY    gabapentin, 100 mg, Oral, TID (Patient taking differently: 100 mg, Oral, 3 TIMES DAILY, 2 cap po tid)    vitamin D, 1,000 Units, Oral, DAILY    There were no vitals filed for this visit.      ASSESSMENT AND PLAN  CC of vaginal bleeding, hold ASA until PCP appt 8/21.     CHF & HTN:  PUMP line number [id number] (PUMP) reviewed with patient  Is blood pressure at less <130/80 in clinic today: y  Home BP and HR:  <110/80 HR 65-95  Kidney function:  Significant improvement in renal function after decreasing furosemide, but this increased right lower extremity edema   Latest Reference Range & Units 06/21/24 01:59 07/10/24 12:18 07/16/24 11:26 07/30/24 07:09   GFR (CKD-EPI) >60 mL/min/1.73 m 2 69 26 ! 32 !  28 ! 67     CHF medications:  Entresto or ACE/ARB: Continue losartan 25mg daily  Beta blocker:  continue metoprolol Suc 50 mg daily  Left " bundle branch block on last EKG-continue to monitor  Diuretic: Continue to  lasix 40mgAM, 20mg PM  per cardiology.   Aldosterone antagonist: Continue spironolactone 25 mg once daily  SGLT-2 Inhibitor: Continue farxiga 10mg daily    Lifestyle   Lifestyle Recommendations From Today's Visit:   Continue to eat DASH/MED style diet.   Continue to exercise as tolerated.  Salt restriction to <2300mg daily             Blood Work Ordered At Today's visit: bmp and CBC  Studies Ordered at Todays Visit:-  Follow-Up: 2 week(s)    Tree Tejeda, PharmD  [facility]  Phone: [phone], Fax: [phone]    This note was created using voice recognition software (Dragon). The accuracy of the dictation is limited by the abilities of the software. I have reviewed the note prior to signing, however some errors in grammar and context are still possible. If you have any questions related to this note please do not hesitate to contact our office.     CC:  Zahra Paz M.D.  Nguyen Mulligan A.*

## 2024-08-19 ENCOUNTER — HOSPITAL ENCOUNTER (OUTPATIENT)
Dept: LAB | Facility: MEDICAL CENTER | Age: 69
End: 2024-08-19
Attending: NURSE PRACTITIONER
Payer: MEDICARE

## 2024-08-19 DIAGNOSIS — I50.20 ACC/AHA STAGE C SYSTOLIC HEART FAILURE (HCC): ICD-10-CM

## 2024-08-19 DIAGNOSIS — I25.10 CORONARY ARTERY DISEASE INVOLVING NATIVE CORONARY ARTERY OF NATIVE HEART WITHOUT ANGINA PECTORIS: ICD-10-CM

## 2024-08-19 LAB
ANION GAP SERPL CALC-SCNC: 12 MMOL/L (ref 7–16)
BASOPHILS # BLD AUTO: 1.4 % (ref 0–1.8)
BASOPHILS # BLD: 0.15 K/UL (ref 0–0.12)
BUN SERPL-MCNC: 16 MG/DL (ref 8–22)
CALCIUM SERPL-MCNC: 9.1 MG/DL (ref 8.5–10.5)
CHLORIDE SERPL-SCNC: 103 MMOL/L (ref 96–112)
CO2 SERPL-SCNC: 26 MMOL/L (ref 20–33)
CREAT SERPL-MCNC: 1.02 MG/DL (ref 0.5–1.4)
EOSINOPHIL # BLD AUTO: 0.35 K/UL (ref 0–0.51)
EOSINOPHIL NFR BLD: 3.2 % (ref 0–6.9)
ERYTHROCYTE [DISTWIDTH] IN BLOOD BY AUTOMATED COUNT: 47.9 FL (ref 35.9–50)
GFR SERPLBLD CREATININE-BSD FMLA CKD-EPI: 59 ML/MIN/1.73 M 2
GLUCOSE SERPL-MCNC: 112 MG/DL (ref 65–99)
HCT VFR BLD AUTO: 47.5 % (ref 37–47)
HGB BLD-MCNC: 14.8 G/DL (ref 12–16)
IMM GRANULOCYTES # BLD AUTO: 0.05 K/UL (ref 0–0.11)
IMM GRANULOCYTES NFR BLD AUTO: 0.5 % (ref 0–0.9)
LYMPHOCYTES # BLD AUTO: 3.07 K/UL (ref 1–4.8)
LYMPHOCYTES NFR BLD: 27.8 % (ref 22–41)
MCH RBC QN AUTO: 27.4 PG (ref 27–33)
MCHC RBC AUTO-ENTMCNC: 31.2 G/DL (ref 32.2–35.5)
MCV RBC AUTO: 87.8 FL (ref 81.4–97.8)
MONOCYTES # BLD AUTO: 1 K/UL (ref 0–0.85)
MONOCYTES NFR BLD AUTO: 9.1 % (ref 0–13.4)
NEUTROPHILS # BLD AUTO: 6.41 K/UL (ref 1.82–7.42)
NEUTROPHILS NFR BLD: 58 % (ref 44–72)
NRBC # BLD AUTO: 0 K/UL
NRBC BLD-RTO: 0 /100 WBC (ref 0–0.2)
PLATELET # BLD AUTO: 319 K/UL (ref 164–446)
PMV BLD AUTO: 11.7 FL (ref 9–12.9)
POTASSIUM SERPL-SCNC: 3.7 MMOL/L (ref 3.6–5.5)
RBC # BLD AUTO: 5.41 M/UL (ref 4.2–5.4)
SODIUM SERPL-SCNC: 141 MMOL/L (ref 135–145)
WBC # BLD AUTO: 11 K/UL (ref 4.8–10.8)

## 2024-08-19 PROCEDURE — 36415 COLL VENOUS BLD VENIPUNCTURE: CPT

## 2024-08-19 PROCEDURE — 80048 BASIC METABOLIC PNL TOTAL CA: CPT

## 2024-08-19 PROCEDURE — 85025 COMPLETE CBC W/AUTO DIFF WBC: CPT

## 2024-08-21 ENCOUNTER — TELEPHONE (OUTPATIENT)
Dept: HEALTH INFORMATION MANAGEMENT | Facility: OTHER | Age: 69
End: 2024-08-21

## 2024-08-21 ENCOUNTER — OFFICE VISIT (OUTPATIENT)
Dept: MEDICAL GROUP | Facility: MEDICAL CENTER | Age: 69
End: 2024-08-21
Payer: MEDICARE

## 2024-08-21 VITALS
OXYGEN SATURATION: 94 % | BODY MASS INDEX: 38.46 KG/M2 | TEMPERATURE: 97.7 F | SYSTOLIC BLOOD PRESSURE: 110 MMHG | RESPIRATION RATE: 20 BRPM | HEIGHT: 62 IN | HEART RATE: 96 BPM | DIASTOLIC BLOOD PRESSURE: 76 MMHG | WEIGHT: 209 LBS

## 2024-08-21 DIAGNOSIS — E66.01 SEVERE OBESITY (BMI 35.0-39.9) WITH COMORBIDITY (HCC): ICD-10-CM

## 2024-08-21 DIAGNOSIS — R73.02 IGT (IMPAIRED GLUCOSE TOLERANCE): ICD-10-CM

## 2024-08-21 DIAGNOSIS — N93.9 VAGINAL BLEEDING: ICD-10-CM

## 2024-08-21 PROCEDURE — 99214 OFFICE O/P EST MOD 30 MIN: CPT | Performed by: FAMILY MEDICINE

## 2024-08-21 PROCEDURE — 3078F DIAST BP <80 MM HG: CPT | Performed by: FAMILY MEDICINE

## 2024-08-21 PROCEDURE — 3074F SYST BP LT 130 MM HG: CPT | Performed by: FAMILY MEDICINE

## 2024-08-21 ASSESSMENT — FIBROSIS 4 INDEX: FIB4 SCORE: 0.77

## 2024-08-21 NOTE — PROGRESS NOTES
Verbal consent was acquired by the patient to use [employer] ambient listening note generation during this visit Yes     CC: Vaginal bleeding, hyperglycemia, obesity    History of Present Illness  The patient is a 68-year-old female who presents for evaluation of vaginal bleeding.    She began experiencing vaginal bleeding approximately two weeks ago, which persisted for a week. The bleeding initially started as a light flow, similar to her menstrual period, but intensified on Tuesday, resulting in heavy bleeding that soaked her bed. This heavy bleeding continued for four days before gradually subsiding over the weekend, with no further bleeding since Sunday. She describes the blood as being dark and slimy in appearance. She consulted her pharmacist, suspecting the bleeding might be a side effect of her medication, but was informed this was not the case. Her baby aspirin was discontinued, although she did not believe it was the cause of the bleeding. She is particularly concerned about the bleeding, given her age. She has never undergone a hysterectomy. She does not feel unwell or fatigued during the bleeding episode.    Her recent blood tests showed normal sodium levels but elevated blood sugar levels. She is considering weight loss medication, such as Wegovy, to help prevent the onset of diabetes. Despite her efforts to lose weight independently, she has been unsuccessful. She is aware that her diet, which includes bread, contributes to her blood sugar levels.    She is scheduled for a sleep study on 01/30/2025 to evaluate for potential sleep apnea.    Her previous gynecologist has retired and relocated, leaving her without a current gynecologist.      Patient Active Problem List    Diagnosis Date Noted    Chronic hypoxemic respiratory failure (HCC) 07/22/2024    Hypertensive heart disease with chronic systolic congestive heart failure (HCC) 07/22/2024    Nonischemic cardiomyopathy (HCC) 07/11/2024    ACC/AHA  stage C systolic heart failure (HCC) 07/11/2024    Cardiac LV ejection fraction 21-30% 06/26/2024    Obesity, Class II, BMI 35-39.9 06/26/2024    Cirrhosis of liver (HCC) 06/21/2024    Abnormal EKG 06/18/2024    Primary insomnia 03/18/2024    Arthritis 03/18/2024    Encounter for osteoporosis screening in asymptomatic postmenopausal patient 03/18/2024    Current moderate episode of major depressive disorder without prior episode (HCC) 10/31/2023    Leukocytosis 12/02/2021    Cataracts, bilateral 12/02/2021    Mixed hyperlipidemia 12/18/2018    Essential hypertension 12/18/2018    Osteopenia 12/18/2018    Hypothyroid 01/13/2018       Current Outpatient Medications   Medication Sig Dispense Refill    potassium chloride SA (KDUR) 20 MEQ Tab CR Take 1 Tablet by mouth every day. 90 Tablet 1    furosemide (LASIX) 40 MG Tab Take 1.5 Tablets by mouth every day. 40 mg in morning and 20 mg in afternoon 180 Tablet 1    metoprolol SR (TOPROL XL) 25 MG TABLET SR 24 HR Take 2 Tablets by mouth every day. 180 Tablet 1    lovastatin (MEVACOR) 20 MG Tab TAKE 1 TABLET BY MOUTH EVERY  Tablet 2    dapagliflozin propanediol (FARXIGA) 10 MG Tab Take 1 Tablet by mouth every day. 90 Tablet 3    losartan (COZAAR) 25 MG Tab Take 1 Tablet by mouth every evening. 90 Tablet 3    sertraline (ZOLOFT) 50 MG Tab Take 1 Tablet by mouth every day. 90 Tablet 2    levothyroxine (SYNTHROID) 88 MCG Tab Take 1 Tablet by mouth every morning on an empty stomach. 90 Tablet 2    fluticasone (FLONASE) 50 MCG/ACT nasal spray Administer 1 Spray into affected nostril(S) 1 time a day as needed.      Multiple Minerals-Vitamins (NUTRA-SUPPORT BONE PO) Take 1 Capsule by mouth every day.      DULoxetine (CYMBALTA) 30 MG Cap DR Particles TAKE 1 CAPSULE BY MOUTH EVERY DAY 90 Capsule 2    gabapentin (NEURONTIN) 100 MG Cap TAKE 1 CAPSULE BY MOUTH THREE TIMES DAILY (Patient taking differently: Take 100 mg by mouth 3 times a day. 2 cap po tid) 180 Capsule 2    vitamin  "D (CHOLECALCIFEROL) 1000 Unit Tab Take 1,000 Units by mouth every day.      aspirin 81 MG EC tablet Take 1 Tablet by mouth every day. (Patient not taking: Reported on 8/21/2024) 100 Tablet 1     No current facility-administered medications for this visit.         Allergies as of 08/21/2024    (No Known Allergies)        ROS: Denies any chest pain, Shortness of breath, Changes bowel or bladder, Lower extremity edema.    Physical Exam:  /76 (BP Location: Left arm, Patient Position: Sitting, BP Cuff Size: Large adult)   Pulse 96   Temp 36.5 °C (97.7 °F) (Temporal)   Resp 20   Ht 1.575 m (5' 2\")   Wt 94.8 kg (209 lb)   SpO2 94%   BMI 38.23 kg/m²   Gen.: Well-developed, well-nourished, no apparent distress,pleasant and cooperative with the examination  Skin:  Warm and dry with good turgor. No rashes or suspicious lesions in visible areas  Neck: Trachea midline,no masses or adenopathy. No JVD.  Cor: Regular rate and rhythm without murmur, gallop or rub.  Lungs: Respirations unlabored.Clear to auscultation with equal breath sounds bilaterally. No wheezes, rhonchi.  Extremities: No cyanosis, clubbing or edema.  Abdomen: Soft, no tenderness, no distention        Assessment and Plan.   68 y.o. female     1. Vaginal bleeding  Refer patient to be evaluated by a gynecologist.  However will order transvaginal ultrasound before seeing the gynecologist    - Referral to Gynecology  - US-PELVIC TRANSVAGINAL ONLY; Future    2. IGT (impaired glucose tolerance)  Blood glucose has been slightly high, however no history of diabetes  Patient is counseled on lifestyle modification  Asked for weight loss medications e.g. Ozempic, Wegovy, and Mounjaro.  Patient is not diabetic and most probably they are not covered by the insurance.  Advised to contact her insurance.    3. Severe obesity (BMI 35.0-39.9) with comorbidity (HCC)  BMI 38.2 . The medical rationale for weight loss in obese individuals is that obesity is associated with " a significant increase in mortality, and many health risks including type 2 diabetes mellitus, hypertension, dyslipidemia, and coronary heart disease. The benefits of weight loss include a reduction in the rate of progression from impaired glucose tolerance to diabetes, blood pressure in hypertensive patients, and lipid levels in higher risk patients. Other noncardiac benefits of weight loss include reductions in urinary incontinence, sleep apnea, and depression, and improvements in quality of life, physical functioning, and mobility.Recommend lifestyle modification: exercise 30 minutes per day 5 days per week. Recommend also portion control.    Asked for weight loss medications e.g. Ozempic, Wegovy, and Mounjaro.  Patient is not diabetic and most probably they are not covered by the insurance.  Advised to contact her insurance.

## 2024-08-27 ENCOUNTER — DOCUMENTATION (OUTPATIENT)
Dept: CARDIOLOGY | Facility: MEDICAL CENTER | Age: 69
End: 2024-08-27

## 2024-08-27 NOTE — PROGRESS NOTES
Patient did not show for their appointment.      I left a voice message for them to call us back to reschedule.    [facility]  Phone [phone] fax [phone]

## 2024-08-29 ENCOUNTER — DOCUMENTATION (OUTPATIENT)
Dept: HEALTH INFORMATION MANAGEMENT | Facility: OTHER | Age: 69
End: 2024-08-29
Payer: MEDICARE

## 2024-08-29 ENCOUNTER — TELEPHONE (OUTPATIENT)
Dept: HEALTH INFORMATION MANAGEMENT | Facility: OTHER | Age: 69
End: 2024-08-29
Payer: MEDICARE

## 2024-09-23 ENCOUNTER — APPOINTMENT (OUTPATIENT)
Dept: CARDIOLOGY | Facility: MEDICAL CENTER | Age: 69
End: 2024-09-23
Attending: FAMILY MEDICINE
Payer: MEDICARE

## 2025-02-22 NOTE — ASSESSMENT & PLAN NOTE
Increasing shortness of breath over the last 2 weeks with dyspnea on exertion and orthopnea  Pulmonary edema on chest x-ray  Elevated BNP  Suspect likely heart failure as etiology  Ordered echocardiogram  Diuresis  Possible pneumonia but no cough and more subacute presentation, was given ceftriaxone and azithromycin, checking procalcitonin and holding off on further antibiotics at this time, continue to reevaluate need for antibiotics.  Watch on telemetry  Wean from oxygen as tolerated  Optimize electrolytes    6/19/2024  Improving to 2 LPM.  Currently.  Baseline is room air.  Secondary to acute systolic heart failure.  Continue IV forced diuresis  PEP therapy and incentive spirometry.  Continuous pulse oximetry monitoring    6/20/2024  Continue to require 3 LPM oxygen by nasal cannula  Repeat chest x-ray  Continue IV forced diuresis  PEP therapy and incentive spirometry.  Continuous pulse oximetry monitoring   yes